# Patient Record
Sex: MALE | Race: WHITE | NOT HISPANIC OR LATINO | Employment: UNEMPLOYED | URBAN - METROPOLITAN AREA
[De-identification: names, ages, dates, MRNs, and addresses within clinical notes are randomized per-mention and may not be internally consistent; named-entity substitution may affect disease eponyms.]

---

## 2018-08-28 ENCOUNTER — HOSPITAL ENCOUNTER (EMERGENCY)
Facility: HOSPITAL | Age: 48
Discharge: HOME | End: 2018-08-29
Attending: EMERGENCY MEDICINE | Admitting: EMERGENCY MEDICINE

## 2018-08-28 ENCOUNTER — APPOINTMENT (OUTPATIENT)
Dept: ULTRASOUND IMAGING | Facility: HOSPITAL | Age: 48
End: 2018-08-28

## 2018-08-28 DIAGNOSIS — N45.1 EPIDIDYMITIS: Primary | ICD-10-CM

## 2018-08-28 LAB
BACTERIA UR QL AUTO: ABNORMAL /HPF
BILIRUB UR QL STRIP: NEGATIVE
CLARITY UR: CLEAR
COLOR UR: ABNORMAL
GLUCOSE UR STRIP-MCNC: NEGATIVE MG/DL
HGB UR QL STRIP.AUTO: NEGATIVE
HYALINE CASTS UR QL AUTO: ABNORMAL /LPF
KETONES UR QL STRIP: NEGATIVE
LEUKOCYTE ESTERASE UR QL STRIP.AUTO: ABNORMAL
NITRITE UR QL STRIP: NEGATIVE
PH UR STRIP.AUTO: <=5 [PH] (ref 5–8)
PROT UR QL STRIP: ABNORMAL
RBC # UR: ABNORMAL /HPF
REF LAB TEST METHOD: ABNORMAL
SP GR UR STRIP: >=1.03 (ref 1–1.03)
SQUAMOUS #/AREA URNS HPF: ABNORMAL /HPF
UROBILINOGEN UR QL STRIP: ABNORMAL
WBC UR QL AUTO: ABNORMAL /HPF

## 2018-08-28 PROCEDURE — 81001 URINALYSIS AUTO W/SCOPE: CPT | Performed by: EMERGENCY MEDICINE

## 2018-08-28 PROCEDURE — 93976 VASCULAR STUDY: CPT

## 2018-08-28 PROCEDURE — 99284 EMERGENCY DEPT VISIT MOD MDM: CPT

## 2018-08-28 PROCEDURE — 76870 US EXAM SCROTUM: CPT

## 2018-08-29 VITALS
HEIGHT: 68 IN | WEIGHT: 190 LBS | TEMPERATURE: 99.1 F | BODY MASS INDEX: 28.79 KG/M2 | DIASTOLIC BLOOD PRESSURE: 103 MMHG | RESPIRATION RATE: 16 BRPM | HEART RATE: 87 BPM | SYSTOLIC BLOOD PRESSURE: 150 MMHG | OXYGEN SATURATION: 97 %

## 2018-08-29 RX ORDER — CIPROFLOXACIN 500 MG/1
500 TABLET, FILM COATED ORAL 2 TIMES DAILY
Qty: 14 TABLET | Refills: 0 | OUTPATIENT
Start: 2018-08-29 | End: 2020-05-18

## 2018-08-29 RX ORDER — HYDROCODONE BITARTRATE AND ACETAMINOPHEN 7.5; 325 MG/1; MG/1
1 TABLET ORAL EVERY 6 HOURS PRN
Qty: 16 TABLET | Refills: 0 | OUTPATIENT
Start: 2018-08-29 | End: 2020-05-18

## 2018-08-29 RX ORDER — HYDROCODONE BITARTRATE AND ACETAMINOPHEN 7.5; 325 MG/1; MG/1
1 TABLET ORAL ONCE
Status: COMPLETED | OUTPATIENT
Start: 2018-08-29 | End: 2018-08-29

## 2018-08-29 RX ADMIN — HYDROCODONE BITARTRATE AND ACETAMINOPHEN 1 TABLET: 7.5; 325 TABLET ORAL at 01:47

## 2019-03-23 ENCOUNTER — HOSPITAL ENCOUNTER (EMERGENCY)
Facility: HOSPITAL | Age: 49
Discharge: HOME | End: 2019-03-23
Attending: EMERGENCY MEDICINE | Admitting: EMERGENCY MEDICINE

## 2019-03-23 VITALS
HEART RATE: 86 BPM | OXYGEN SATURATION: 94 % | HEIGHT: 68 IN | WEIGHT: 200 LBS | RESPIRATION RATE: 16 BRPM | SYSTOLIC BLOOD PRESSURE: 130 MMHG | BODY MASS INDEX: 30.31 KG/M2 | TEMPERATURE: 98.3 F | DIASTOLIC BLOOD PRESSURE: 70 MMHG

## 2019-03-23 DIAGNOSIS — L03.211 FACIAL CELLULITIS: Primary | ICD-10-CM

## 2019-03-23 PROCEDURE — 99283 EMERGENCY DEPT VISIT LOW MDM: CPT

## 2019-03-23 RX ORDER — DOXYCYCLINE 100 MG/1
100 CAPSULE ORAL ONCE
Status: COMPLETED | OUTPATIENT
Start: 2019-03-23 | End: 2019-03-23

## 2019-03-23 RX ORDER — MUPIROCIN CALCIUM 20 MG/G
CREAM TOPICAL 3 TIMES DAILY
Qty: 15 G | Refills: 0 | OUTPATIENT
Start: 2019-03-23 | End: 2020-05-18

## 2019-03-23 RX ORDER — DOXYCYCLINE 100 MG/1
100 CAPSULE ORAL 2 TIMES DAILY
Qty: 20 CAPSULE | Refills: 0 | OUTPATIENT
Start: 2019-03-23 | End: 2020-05-18

## 2019-03-23 RX ORDER — HYDROCODONE BITARTRATE AND ACETAMINOPHEN 5; 325 MG/1; MG/1
1 TABLET ORAL EVERY 6 HOURS PRN
Qty: 4 TABLET | Refills: 0 | OUTPATIENT
Start: 2019-03-23 | End: 2020-05-18

## 2019-03-23 RX ORDER — HYDROCODONE BITARTRATE AND ACETAMINOPHEN 5; 325 MG/1; MG/1
1 TABLET ORAL EVERY 6 HOURS PRN
Status: DISCONTINUED | OUTPATIENT
Start: 2019-03-23 | End: 2019-03-23 | Stop reason: HOSPADM

## 2019-03-23 RX ADMIN — DOXYCYCLINE 100 MG: 100 CAPSULE ORAL at 19:56

## 2019-03-23 RX ADMIN — HYDROCODONE BITARTRATE AND ACETAMINOPHEN 1 TABLET: 5; 325 TABLET ORAL at 19:57

## 2020-05-17 ENCOUNTER — HOSPITAL ENCOUNTER (EMERGENCY)
Facility: HOSPITAL | Age: 50
Discharge: HOME | End: 2020-05-18
Attending: EMERGENCY MEDICINE | Admitting: EMERGENCY MEDICINE

## 2020-05-17 ENCOUNTER — APPOINTMENT (OUTPATIENT)
Dept: CT IMAGING | Facility: HOSPITAL | Age: 50
End: 2020-05-17

## 2020-05-17 DIAGNOSIS — K59.00 CONSTIPATION, UNSPECIFIED CONSTIPATION TYPE: Primary | ICD-10-CM

## 2020-05-17 LAB
ALBUMIN SERPL-MCNC: 4.3 G/DL (ref 3.5–5.2)
ALBUMIN/GLOB SERPL: 1.4 G/DL
ALP SERPL-CCNC: 72 U/L (ref 39–117)
ALT SERPL W P-5'-P-CCNC: 11 U/L (ref 1–41)
ANION GAP SERPL CALCULATED.3IONS-SCNC: 11.1 MMOL/L (ref 5–15)
AST SERPL-CCNC: 16 U/L (ref 1–40)
BASOPHILS # BLD AUTO: 0.05 10*3/MM3 (ref 0–0.2)
BASOPHILS NFR BLD AUTO: 0.5 % (ref 0–1.5)
BILIRUB SERPL-MCNC: 0.4 MG/DL (ref 0.2–1.2)
BILIRUB UR QL STRIP: NEGATIVE
BUN BLD-MCNC: 12 MG/DL (ref 6–20)
BUN/CREAT SERPL: 10.3 (ref 7–25)
CALCIUM SPEC-SCNC: 8.9 MG/DL (ref 8.6–10.5)
CHLORIDE SERPL-SCNC: 98 MMOL/L (ref 98–107)
CLARITY UR: ABNORMAL
CO2 SERPL-SCNC: 28.9 MMOL/L (ref 22–29)
COLOR UR: YELLOW
CREAT BLD-MCNC: 1.16 MG/DL (ref 0.76–1.27)
D-LACTATE SERPL-SCNC: 1.1 MMOL/L (ref 0.5–2)
DEPRECATED RDW RBC AUTO: 41.2 FL (ref 37–54)
EOSINOPHIL # BLD AUTO: 0.12 10*3/MM3 (ref 0–0.4)
EOSINOPHIL NFR BLD AUTO: 1.1 % (ref 0.3–6.2)
ERYTHROCYTE [DISTWIDTH] IN BLOOD BY AUTOMATED COUNT: 12.7 % (ref 12.3–15.4)
GFR SERPL CREATININE-BSD FRML MDRD: 67 ML/MIN/1.73
GLOBULIN UR ELPH-MCNC: 3.1 GM/DL
GLUCOSE BLD-MCNC: 93 MG/DL (ref 65–99)
GLUCOSE UR STRIP-MCNC: NEGATIVE MG/DL
HCT VFR BLD AUTO: 41.3 % (ref 37.5–51)
HGB BLD-MCNC: 14.4 G/DL (ref 13–17.7)
HGB UR QL STRIP.AUTO: NEGATIVE
HOLD SPECIMEN: NORMAL
HOLD SPECIMEN: NORMAL
IMM GRANULOCYTES # BLD AUTO: 0.02 10*3/MM3 (ref 0–0.05)
IMM GRANULOCYTES NFR BLD AUTO: 0.2 % (ref 0–0.5)
KETONES UR QL STRIP: NEGATIVE
LEUKOCYTE ESTERASE UR QL STRIP.AUTO: NEGATIVE
LIPASE SERPL-CCNC: 15 U/L (ref 13–60)
LYMPHOCYTES # BLD AUTO: 2.36 10*3/MM3 (ref 0.7–3.1)
LYMPHOCYTES NFR BLD AUTO: 22.5 % (ref 19.6–45.3)
MCH RBC QN AUTO: 31.1 PG (ref 26.6–33)
MCHC RBC AUTO-ENTMCNC: 34.9 G/DL (ref 31.5–35.7)
MCV RBC AUTO: 89.2 FL (ref 79–97)
MONOCYTES # BLD AUTO: 0.9 10*3/MM3 (ref 0.1–0.9)
MONOCYTES NFR BLD AUTO: 8.6 % (ref 5–12)
NEUTROPHILS # BLD AUTO: 7.06 10*3/MM3 (ref 1.7–7)
NEUTROPHILS NFR BLD AUTO: 67.1 % (ref 42.7–76)
NITRITE UR QL STRIP: NEGATIVE
NRBC BLD AUTO-RTO: 0 /100 WBC (ref 0–0.2)
PH UR STRIP.AUTO: 7 [PH] (ref 5–8)
PLATELET # BLD AUTO: 182 10*3/MM3 (ref 140–450)
PMV BLD AUTO: 9.8 FL (ref 6–12)
POTASSIUM BLD-SCNC: 3.8 MMOL/L (ref 3.5–5.2)
PROT SERPL-MCNC: 7.4 G/DL (ref 6–8.5)
PROT UR QL STRIP: NEGATIVE
RBC # BLD AUTO: 4.63 10*6/MM3 (ref 4.14–5.8)
SODIUM BLD-SCNC: 138 MMOL/L (ref 136–145)
SP GR UR STRIP: 1.01 (ref 1–1.03)
UROBILINOGEN UR QL STRIP: ABNORMAL
WBC NRBC COR # BLD: 10.51 10*3/MM3 (ref 3.4–10.8)
WHOLE BLOOD HOLD SPECIMEN: NORMAL
WHOLE BLOOD HOLD SPECIMEN: NORMAL

## 2020-05-17 PROCEDURE — 99284 EMERGENCY DEPT VISIT MOD MDM: CPT

## 2020-05-17 PROCEDURE — 81003 URINALYSIS AUTO W/O SCOPE: CPT | Performed by: EMERGENCY MEDICINE

## 2020-05-17 PROCEDURE — 25010000002 IOPAMIDOL 61 % SOLUTION: Performed by: EMERGENCY MEDICINE

## 2020-05-17 PROCEDURE — 80053 COMPREHEN METABOLIC PANEL: CPT

## 2020-05-17 PROCEDURE — 85025 COMPLETE CBC W/AUTO DIFF WBC: CPT

## 2020-05-17 PROCEDURE — 83690 ASSAY OF LIPASE: CPT

## 2020-05-17 PROCEDURE — 74177 CT ABD & PELVIS W/CONTRAST: CPT

## 2020-05-17 PROCEDURE — 83605 ASSAY OF LACTIC ACID: CPT | Performed by: NURSE PRACTITIONER

## 2020-05-17 RX ORDER — SODIUM CHLORIDE 0.9 % (FLUSH) 0.9 %
10 SYRINGE (ML) INJECTION AS NEEDED
Status: DISCONTINUED | OUTPATIENT
Start: 2020-05-17 | End: 2020-05-18 | Stop reason: HOSPADM

## 2020-05-17 RX ADMIN — SODIUM CHLORIDE 1000 ML: 9 INJECTION, SOLUTION INTRAVENOUS at 22:59

## 2020-05-17 RX ADMIN — IOPAMIDOL 85 ML: 612 INJECTION, SOLUTION INTRAVENOUS at 23:59

## 2020-05-17 RX ADMIN — SODIUM CHLORIDE, PRESERVATIVE FREE 10 ML: 5 INJECTION INTRAVENOUS at 22:37

## 2020-05-18 VITALS
DIASTOLIC BLOOD PRESSURE: 97 MMHG | SYSTOLIC BLOOD PRESSURE: 148 MMHG | HEIGHT: 69 IN | RESPIRATION RATE: 18 BRPM | TEMPERATURE: 97.5 F | WEIGHT: 197.5 LBS | HEART RATE: 85 BPM | BODY MASS INDEX: 29.25 KG/M2 | OXYGEN SATURATION: 96 %

## 2020-05-18 RX ORDER — POLYETHYLENE GLYCOL 3350 17 G/17G
17 POWDER, FOR SOLUTION ORAL DAILY
Qty: 500 G | Refills: 0 | Status: SHIPPED | OUTPATIENT
Start: 2020-05-18

## 2020-05-18 NOTE — ED PROVIDER NOTES
MD ATTESTATION NOTE    The VALENTINO and I have discussed this patient's history, physical exam, and treatment plan.  I have reviewed the documentation and personally had a face to face interaction with the patient. I affirm the documentation and agree with the treatment and plan.  The attached note describes my personal findings.    Patient was placed in face mask in first look. Patient was wearing facemask when I entered the room and throughout our encounter. I wore full protective equipment throughout this patient encounter including a face mask, and gloves. Hand hygiene was performed before donning protective equipment and after removal when leaving the room.    HPI:    Patient reports increasing mid abdominal pain for past 2 days with constipation, last BM 3 days ago. He reports no relief with exlax or OTC enemas.  No fever, chronic dysuria and frequency. No hematachezia or melena.    PE:    Abdomen - not distended moderate mid abdominal tenderness without rebound, no tenderness to LLQ. Bowel sounds normal.    Patient reports moderate results with enema and abdominal cramps have diminished.    DIAGNOSIS:    Final diagnoses:   Constipation, unspecified constipation type       DISPOSITION:    DISCHARGED     Reginaldo Krishnan MD  05/18/20 0154

## 2020-05-18 NOTE — ED NOTES
Pt placed in mask upon arrival, staff wearing masks. Pt reports severe constipation and low abd pain. Pt has tried enemas and exlax without results. Last bm was 3 days ago.     Moon Johnson RN  05/17/20 4583

## 2020-05-18 NOTE — ED PROVIDER NOTES
EMERGENCY DEPARTMENT ENCOUNTER    Room Number:  11/11  Date of encounter:  5/18/2020  PCP: Provider, No Known  Historian: patient   Full history not obtainable due to: none     HPI:  Chief Complaint: abdominal pain     Context: Colten Hager is a 49 y.o. male who presents to the ED c/o abdominal pain onset 2 days ago. Reports intermittent severe pain diffusely across his abdomen associated with constipation. Last normal bm for 4 days prior. Did take exlax yesterday and had very small amount of stool out and did not feel any better.   No vomiting but reports nausea. No fever. Does reports dysuria but it has been ongoing for 1 year.     No hx of diverticulitis. No hx of pancreatitis. No prior colonoscopy. No prior abdominal surgeries. Not a daily ETOH user. Smoker, 1PPD.  Not anticoagulated.         PAST MEDICAL HISTORY    Active Ambulatory Problems     Diagnosis Date Noted   • No Active Ambulatory Problems     Resolved Ambulatory Problems     Diagnosis Date Noted   • No Resolved Ambulatory Problems     Past Medical History:   Diagnosis Date   • Cellulitis    • GERD (gastroesophageal reflux disease)    • Hyperlipidemia    • Hypertension          PAST SURGICAL HISTORY  History reviewed. No pertinent surgical history.      FAMILY HISTORY  History reviewed. No pertinent family history.      SOCIAL HISTORY  Social History     Socioeconomic History   • Marital status:      Spouse name: Not on file   • Number of children: Not on file   • Years of education: Not on file   • Highest education level: Not on file   Tobacco Use   • Smoking status: Current Every Day Smoker     Packs/day: 1.00   • Smokeless tobacco: Never Used   Substance and Sexual Activity   • Alcohol use: No   • Drug use: Yes     Types: Methamphetamines     Comment: snorts meth every other day         ALLERGIES  Patient has no known allergies.        REVIEW OF SYSTEMS  Review of Systems   All systems reviewed and marked as negative except as listed in  HPI       PHYSICAL EXAM    I have reviewed the triage vital signs and nursing notes.    ED Triage Vitals [05/17/20 2224]   Temp Heart Rate Resp BP SpO2   97.7 °F (36.5 °C) 95 20 -- 98 %      Temp src Heart Rate Source Patient Position BP Location FiO2 (%)   Tympanic Monitor -- -- --       GENERAL: alert well developed, well nourished in mild distress secondary to pain  HENT: NCAT, neck supple, trachea midline  EYES: no scleral icterus, PERRL, normal conjunctiva  CV: regular rhythm, regular rate, no murmur  RESPIRATORY: unlabored effort, CTAB  ABDOMEN: soft, diffusely tender, non-distended, bowel sounds present  MUSCULOSKELETAL: no gross deformity  NEURO: alert,  sensory and motor function of extremities grossly intact, speech clear, mental status normal/baseline  SKIN: warm, dry, no rash  PSYCH:  Appropriate mood and affect    Vital signs and nursing notes reviewed.          LAB RESULTS  Recent Results (from the past 24 hour(s))   Comprehensive Metabolic Panel    Collection Time: 05/17/20 10:37 PM   Result Value Ref Range    Glucose 93 65 - 99 mg/dL    BUN 12 6 - 20 mg/dL    Creatinine 1.16 0.76 - 1.27 mg/dL    Sodium 138 136 - 145 mmol/L    Potassium 3.8 3.5 - 5.2 mmol/L    Chloride 98 98 - 107 mmol/L    CO2 28.9 22.0 - 29.0 mmol/L    Calcium 8.9 8.6 - 10.5 mg/dL    Total Protein 7.4 6.0 - 8.5 g/dL    Albumin 4.30 3.50 - 5.20 g/dL    ALT (SGPT) 11 1 - 41 U/L    AST (SGOT) 16 1 - 40 U/L    Alkaline Phosphatase 72 39 - 117 U/L    Total Bilirubin 0.4 0.2 - 1.2 mg/dL    eGFR Non African Amer 67 >60 mL/min/1.73    Globulin 3.1 gm/dL    A/G Ratio 1.4 g/dL    BUN/Creatinine Ratio 10.3 7.0 - 25.0    Anion Gap 11.1 5.0 - 15.0 mmol/L   Lipase    Collection Time: 05/17/20 10:37 PM   Result Value Ref Range    Lipase 15 13 - 60 U/L   Light Blue Top    Collection Time: 05/17/20 10:37 PM   Result Value Ref Range    Extra Tube hold for add-on    Green Top (Gel)    Collection Time: 05/17/20 10:37 PM   Result Value Ref Range     Extra Tube Hold for add-ons.    Lavender Top    Collection Time: 05/17/20 10:37 PM   Result Value Ref Range    Extra Tube hold for add-on    Gold Top - SST    Collection Time: 05/17/20 10:37 PM   Result Value Ref Range    Extra Tube Hold for add-ons.    CBC Auto Differential    Collection Time: 05/17/20 10:37 PM   Result Value Ref Range    WBC 10.51 3.40 - 10.80 10*3/mm3    RBC 4.63 4.14 - 5.80 10*6/mm3    Hemoglobin 14.4 13.0 - 17.7 g/dL    Hematocrit 41.3 37.5 - 51.0 %    MCV 89.2 79.0 - 97.0 fL    MCH 31.1 26.6 - 33.0 pg    MCHC 34.9 31.5 - 35.7 g/dL    RDW 12.7 12.3 - 15.4 %    RDW-SD 41.2 37.0 - 54.0 fl    MPV 9.8 6.0 - 12.0 fL    Platelets 182 140 - 450 10*3/mm3    Neutrophil % 67.1 42.7 - 76.0 %    Lymphocyte % 22.5 19.6 - 45.3 %    Monocyte % 8.6 5.0 - 12.0 %    Eosinophil % 1.1 0.3 - 6.2 %    Basophil % 0.5 0.0 - 1.5 %    Immature Grans % 0.2 0.0 - 0.5 %    Neutrophils, Absolute 7.06 (H) 1.70 - 7.00 10*3/mm3    Lymphocytes, Absolute 2.36 0.70 - 3.10 10*3/mm3    Monocytes, Absolute 0.90 0.10 - 0.90 10*3/mm3    Eosinophils, Absolute 0.12 0.00 - 0.40 10*3/mm3    Basophils, Absolute 0.05 0.00 - 0.20 10*3/mm3    Immature Grans, Absolute 0.02 0.00 - 0.05 10*3/mm3    nRBC 0.0 0.0 - 0.2 /100 WBC   Lactic Acid, Plasma    Collection Time: 05/17/20 10:57 PM   Result Value Ref Range    Lactate 1.1 0.5 - 2.0 mmol/L   Urinalysis With Microscopic If Indicated (No Culture) - Urine, Clean Catch    Collection Time: 05/17/20 11:42 PM   Result Value Ref Range    Color, UA Yellow Yellow, Straw    Appearance, UA Cloudy (A) Clear    pH, UA 7.0 5.0 - 8.0    Specific Gravity, UA 1.011 1.005 - 1.030    Glucose, UA Negative Negative    Ketones, UA Negative Negative    Bilirubin, UA Negative Negative    Blood, UA Negative Negative    Protein, UA Negative Negative    Leuk Esterase, UA Negative Negative    Nitrite, UA Negative Negative    Urobilinogen, UA 0.2 E.U./dL 0.2 - 1.0 E.U./dL       Ordered the above labs and independently  reviewed the results.        RADIOLOGY  Ct Abdomen Pelvis With Contrast    Result Date: 5/18/2020  CT OF THE ABDOMEN AND PELVIS WITH CONTRAST  HISTORY: Diffuse abdominal pain and constipation  COMPARISON: None available.  TECHNIQUE: Axial CT imaging was obtained from the dome the diaphragm to the symphysis pubis. IV contrast was administered.  FINDINGS: Cardiomegaly is present. There is vascular congestion. There is a small hiatal hernia. Duodenum is unremarkable, as are the adrenal glands, spleen, pancreas, and gallbladder. No focal hepatic lesions are seen, and there is no biliary dilatation. The kidneys enhance symmetrically. There is some mild prominence of the renal collecting systems bilaterally. This may be related to some urinary retention, as the urinary bladder does appear distended. Prostate gland contains dystrophic calcifications. The appendix is normal. No free fluid or adenopathy is seen within the pelvis. Stool burden is mildly increased within the ascending and transverse colon, which would be in keeping with history of constipation. The distal colon appears relatively decompressed. No acute osseous abnormalities are seen.       1. Increased stool burden identified within the ascending and transverse colon, in keeping with history of constipation. No evidence chemical bowel obstruction. 2. Cardiomegaly with evidence of vascular congestion. 3. Mild prominence of the renal collecting systems, favored to be related to some urinary retention.  Radiation dose reduction techniques were utilized, including automated exposure control and exposure modulation based on body size.  This report was finalized on 5/18/2020 12:41 AM by Dr. Candie Chang M.D.        I ordered the above noted radiological studies. Independently reviewed by me and discussed with radiologist.  See dictation above for official radiology interpretation.      PROCEDURES    Procedures        MEDICATIONS GIVEN IN ER    Medications    sodium chloride 0.9 % flush 10 mL (10 mL Intravenous Given 5/17/20 2237)   sodium chloride 0.9 % bolus 1,000 mL (0 mL Intravenous Stopped 5/18/20 0006)   iopamidol (ISOVUE-300) 61 % injection 100 mL (85 mL Intravenous Given by Other 5/17/20 8969)         PROGRESS, DATA ANALYSIS, CONSULTS, AND MEDICAL DECISION MAKING    All labs have been independently reviewed by me.  All radiology studies have been reviewed by me.   EKG's independently reviewed by me.  Discussion below represents my analysis of pertinent findings related to patient's condition, differential diagnosis, treatment plan and final disposition.    DIFFERENTIAL DIAGNOSIS INCLUDE BUT NOT LIMITED TO: Diverticulitis, colitis, small bowel obstruction, proctitis, irritable bowel syndrome, fecal impaction, ileus, AAA, appendicitis, abdominal spasm, constipation    ED Course as of May 18 0156   Mon May 18, 2020   0030 I viewed the CT scan on PACS system.  My findings are moderate stool burden    [JS]   0154 Reviewed CT results and findings of ascending and transverse colon constipation.  Laboratory work-up is unremarkable.  My suspicion for ischemic bowel is low given findings of constipation and history that confirms, in addition to normal white blood cell count and normal lactic acid.  The patient was given an enema in the emergency department and had good results in terms of stool output and decrease in his cramping.  He will be discharged home with return precautions.    [JS]      ED Course User Index  [JS] Lizeth Clark APRN       AS OF 01:56 VITALS:    BP - 145/97  HR - 84  TEMP - 97.7 °F (36.5 °C) (Tympanic)  02 SATS - 93%        DIAGNOSIS  Final diagnoses:   Constipation, unspecified constipation type         DISPOSITION  Discharge        Lizeth Clark, DIANA  05/18/20 0156

## 2020-05-19 ENCOUNTER — HOSPITAL ENCOUNTER (EMERGENCY)
Facility: HOSPITAL | Age: 50
Discharge: HOME | End: 2020-05-19
Attending: EMERGENCY MEDICINE | Admitting: EMERGENCY MEDICINE

## 2020-05-19 VITALS
RESPIRATION RATE: 18 BRPM | HEART RATE: 92 BPM | HEIGHT: 71 IN | SYSTOLIC BLOOD PRESSURE: 144 MMHG | WEIGHT: 195 LBS | TEMPERATURE: 96.7 F | DIASTOLIC BLOOD PRESSURE: 105 MMHG | BODY MASS INDEX: 27.3 KG/M2 | OXYGEN SATURATION: 97 %

## 2020-05-19 DIAGNOSIS — I10 ESSENTIAL HYPERTENSION: ICD-10-CM

## 2020-05-19 DIAGNOSIS — K59.00 CONSTIPATION, UNSPECIFIED CONSTIPATION TYPE: ICD-10-CM

## 2020-05-19 DIAGNOSIS — K29.00 ACUTE GASTRITIS, PRESENCE OF BLEEDING UNSPECIFIED, UNSPECIFIED GASTRITIS TYPE: Primary | ICD-10-CM

## 2020-05-19 DIAGNOSIS — R10.13 EPIGASTRIC PAIN: ICD-10-CM

## 2020-05-19 LAB
ALBUMIN SERPL-MCNC: 4.2 G/DL (ref 3.5–5.2)
ALBUMIN/GLOB SERPL: 1.4 G/DL
ALP SERPL-CCNC: 74 U/L (ref 39–117)
ALT SERPL W P-5'-P-CCNC: 12 U/L (ref 1–41)
ANION GAP SERPL CALCULATED.3IONS-SCNC: 10.6 MMOL/L (ref 5–15)
AST SERPL-CCNC: 13 U/L (ref 1–40)
BACTERIA UR QL AUTO: NORMAL /HPF
BASOPHILS # BLD AUTO: 0.07 10*3/MM3 (ref 0–0.2)
BASOPHILS NFR BLD AUTO: 0.8 % (ref 0–1.5)
BILIRUB SERPL-MCNC: 0.3 MG/DL (ref 0.2–1.2)
BILIRUB UR QL STRIP: NEGATIVE
BUN BLD-MCNC: 12 MG/DL (ref 6–20)
BUN/CREAT SERPL: 12.1 (ref 7–25)
CALCIUM SPEC-SCNC: 8.7 MG/DL (ref 8.6–10.5)
CHLORIDE SERPL-SCNC: 102 MMOL/L (ref 98–107)
CLARITY UR: CLEAR
CO2 SERPL-SCNC: 24.4 MMOL/L (ref 22–29)
COLOR UR: YELLOW
CREAT BLD-MCNC: 0.99 MG/DL (ref 0.76–1.27)
DEPRECATED RDW RBC AUTO: 42.4 FL (ref 37–54)
EOSINOPHIL # BLD AUTO: 0.1 10*3/MM3 (ref 0–0.4)
EOSINOPHIL NFR BLD AUTO: 1.1 % (ref 0.3–6.2)
ERYTHROCYTE [DISTWIDTH] IN BLOOD BY AUTOMATED COUNT: 12.9 % (ref 12.3–15.4)
GFR SERPL CREATININE-BSD FRML MDRD: 80 ML/MIN/1.73
GLOBULIN UR ELPH-MCNC: 2.9 GM/DL
GLUCOSE BLD-MCNC: 108 MG/DL (ref 65–99)
GLUCOSE UR STRIP-MCNC: NEGATIVE MG/DL
HCT VFR BLD AUTO: 43.8 % (ref 37.5–51)
HGB BLD-MCNC: 14.9 G/DL (ref 13–17.7)
HGB UR QL STRIP.AUTO: NEGATIVE
HOLD SPECIMEN: NORMAL
HOLD SPECIMEN: NORMAL
HYALINE CASTS UR QL AUTO: NORMAL /LPF
IMM GRANULOCYTES # BLD AUTO: 0.02 10*3/MM3 (ref 0–0.05)
IMM GRANULOCYTES NFR BLD AUTO: 0.2 % (ref 0–0.5)
KETONES UR QL STRIP: NEGATIVE
LEUKOCYTE ESTERASE UR QL STRIP.AUTO: NEGATIVE
LIPASE SERPL-CCNC: 15 U/L (ref 13–60)
LYMPHOCYTES # BLD AUTO: 1.93 10*3/MM3 (ref 0.7–3.1)
LYMPHOCYTES NFR BLD AUTO: 21.5 % (ref 19.6–45.3)
MCH RBC QN AUTO: 30.5 PG (ref 26.6–33)
MCHC RBC AUTO-ENTMCNC: 34 G/DL (ref 31.5–35.7)
MCV RBC AUTO: 89.8 FL (ref 79–97)
MONOCYTES # BLD AUTO: 1.06 10*3/MM3 (ref 0.1–0.9)
MONOCYTES NFR BLD AUTO: 11.8 % (ref 5–12)
NEUTROPHILS # BLD AUTO: 5.79 10*3/MM3 (ref 1.7–7)
NEUTROPHILS NFR BLD AUTO: 64.6 % (ref 42.7–76)
NITRITE UR QL STRIP: NEGATIVE
NRBC BLD AUTO-RTO: 0 /100 WBC (ref 0–0.2)
PH UR STRIP.AUTO: 7 [PH] (ref 5–8)
PLATELET # BLD AUTO: 193 10*3/MM3 (ref 140–450)
PMV BLD AUTO: 10.3 FL (ref 6–12)
POTASSIUM BLD-SCNC: 4 MMOL/L (ref 3.5–5.2)
PROT SERPL-MCNC: 7.1 G/DL (ref 6–8.5)
PROT UR QL STRIP: ABNORMAL
RBC # BLD AUTO: 4.88 10*6/MM3 (ref 4.14–5.8)
RBC # UR: NORMAL /HPF
REF LAB TEST METHOD: NORMAL
SODIUM BLD-SCNC: 137 MMOL/L (ref 136–145)
SP GR UR STRIP: 1.02 (ref 1–1.03)
SQUAMOUS #/AREA URNS HPF: NORMAL /HPF
UROBILINOGEN UR QL STRIP: ABNORMAL
WBC NRBC COR # BLD: 8.97 10*3/MM3 (ref 3.4–10.8)
WBC UR QL AUTO: NORMAL /HPF
WHOLE BLOOD HOLD SPECIMEN: NORMAL

## 2020-05-19 PROCEDURE — 99283 EMERGENCY DEPT VISIT LOW MDM: CPT

## 2020-05-19 PROCEDURE — 83690 ASSAY OF LIPASE: CPT | Performed by: EMERGENCY MEDICINE

## 2020-05-19 PROCEDURE — 80053 COMPREHEN METABOLIC PANEL: CPT | Performed by: EMERGENCY MEDICINE

## 2020-05-19 PROCEDURE — 51798 US URINE CAPACITY MEASURE: CPT

## 2020-05-19 PROCEDURE — 81001 URINALYSIS AUTO W/SCOPE: CPT | Performed by: EMERGENCY MEDICINE

## 2020-05-19 PROCEDURE — 85025 COMPLETE CBC W/AUTO DIFF WBC: CPT | Performed by: EMERGENCY MEDICINE

## 2020-05-19 RX ORDER — ALUMINA, MAGNESIA, AND SIMETHICONE 2400; 2400; 240 MG/30ML; MG/30ML; MG/30ML
15 SUSPENSION ORAL ONCE
Status: COMPLETED | OUTPATIENT
Start: 2020-05-19 | End: 2020-05-19

## 2020-05-19 RX ORDER — SUCRALFATE 1 G/1
1 TABLET ORAL 4 TIMES DAILY
Qty: 16 TABLET | Refills: 0 | Status: SHIPPED | OUTPATIENT
Start: 2020-05-19

## 2020-05-19 RX ORDER — SODIUM CHLORIDE 0.9 % (FLUSH) 0.9 %
10 SYRINGE (ML) INJECTION AS NEEDED
Status: DISCONTINUED | OUTPATIENT
Start: 2020-05-19 | End: 2020-05-19 | Stop reason: HOSPADM

## 2020-05-19 RX ORDER — PANTOPRAZOLE SODIUM 40 MG/1
40 TABLET, DELAYED RELEASE ORAL DAILY
Qty: 14 TABLET | Refills: 0 | Status: SHIPPED | OUTPATIENT
Start: 2020-05-19 | End: 2020-06-02

## 2020-05-19 RX ORDER — LIDOCAINE HYDROCHLORIDE 20 MG/ML
15 SOLUTION OROPHARYNGEAL ONCE
Status: COMPLETED | OUTPATIENT
Start: 2020-05-19 | End: 2020-05-19

## 2020-05-19 RX ADMIN — ALUMINUM HYDROXIDE, MAGNESIUM HYDROXIDE, AND DIMETHICONE 15 ML: 400; 400; 40 SUSPENSION ORAL at 13:02

## 2020-05-19 RX ADMIN — LIDOCAINE HYDROCHLORIDE 15 ML: 20 SOLUTION ORAL; TOPICAL at 13:02

## 2020-05-19 NOTE — DISCHARGE INSTRUCTIONS
You are advised to follow closely with primary physician of your choice in 2-3 days Dr. Moran or gastroenterology specialist of your choice in 7 to 10 days as needed for recheck, blood pressure recheck, final results of lab work and imaging testing, and further testing/treatment as needed.    Drink at least 8 glasses of water daily.  Eat a diet full of fruits and vegetables.  Avoid processed foods.  Continue MiraLAX daily while suffering from constipation.  Take probiotic supplement daily.      Please return to the emergency department immediately with chest pain different than usual for you, shortness of air, persistent or worsening abdominal pain, persistent vomiting/fever, blood in emesis or stool, lightheadedness/fainting, problems with speech, one sided weakness/numbness, new incontinence, problems with vision,  or for worsening of symptoms or other concerns.

## 2020-05-19 NOTE — ED PROVIDER NOTES
EMERGENCY DEPARTMENT ENCOUNTER    CHIEF COMPLAINT  Chief Complaint: Abdominal pain  History given by: Patient  History limited by: Nothing  Room Number: 35/35  PMD: Provider, No Known    On review of patient's medical record it is noted that he had a CT abdomen pelvis done 2 days prior to his arrival today that is significant for mildly increased stool burden within the ascending and transverse colon without signs of obstruction and some mild prominence of the renal collecting systems bilaterally with urinary bladder distention.    HPI:  Pt is a 49 y.o. male who presents complaining of abdominal pain for 4 days.  Patient reports his last bowel movement was approximately a week ago except for small amount of stool with enema and episodically at home.  Patient reports his pain is characterizes bloated or cramping in nature with occasional worsening of sharp pains after eating.  Patient denies vomiting but reports he has had nausea, denies fever states he has had a chronic cough that is unchanged, denies chest pain or shortness of air.  Patient denies testicular or scrotal swelling or pain.  Patient denies urinary complaints, swelling of extremities, history of abdominal surgeries.  Patient reports he does use tobacco and uses meth regularly with last use yesterday.  Patient does report he is taking MiraLAX yesterday and today and suffers from reflux currently using Tums at home.    Duration: 1 week  Intensity/Severity: Moderate  Associated Symptoms: Nausea  Aggravating Factors: Food  Alleviating Factors: Nothing  Previous Episodes: History of reflux  Treatment before arrival: Relaxed today    PAST MEDICAL HISTORY  Active Ambulatory Problems     Diagnosis Date Noted   • No Active Ambulatory Problems     Resolved Ambulatory Problems     Diagnosis Date Noted   • No Resolved Ambulatory Problems     Past Medical History:   Diagnosis Date   • Cellulitis    • GERD (gastroesophageal reflux disease)    • Hyperlipidemia    •  Hypertension        PAST SURGICAL HISTORY  No past surgical history on file.    FAMILY HISTORY  No family history on file.    SOCIAL HISTORY  Social History     Socioeconomic History   • Marital status:      Spouse name: Not on file   • Number of children: Not on file   • Years of education: Not on file   • Highest education level: Not on file   Tobacco Use   • Smoking status: Current Every Day Smoker     Packs/day: 1.00   • Smokeless tobacco: Never Used   Substance and Sexual Activity   • Alcohol use: No   • Drug use: Yes     Types: Methamphetamines     Comment: snorts meth every other day       ALLERGIES  Patient has no known allergies.    REVIEW OF SYSTEMS  Review of Systems   Constitutional: Negative for chills and fever.   HENT: Negative for sore throat and trouble swallowing.    Eyes: Negative for visual disturbance.   Respiratory: Positive for cough ( Chronic unchanged for several months). Negative for shortness of breath.    Cardiovascular: Negative for chest pain and leg swelling.   Gastrointestinal: Positive for abdominal pain, constipation and nausea. Negative for diarrhea and vomiting.   Endocrine: Negative.    Genitourinary: Negative for decreased urine volume, difficulty urinating and frequency.   Musculoskeletal: Negative for neck pain.   Skin: Negative for rash.   Allergic/Immunologic: Negative.    Neurological: Negative for weakness and numbness.   Hematological: Negative.    Psychiatric/Behavioral: Negative.    All other systems reviewed and are negative.      PHYSICAL EXAM  ED Triage Vitals   Temp Heart Rate Resp BP SpO2   05/19/20 1125 05/19/20 1125 05/19/20 1125 05/19/20 1137 05/19/20 1125   96.7 °F (35.9 °C) 92 18 (!) 154/112 99 %      Temp src Heart Rate Source Patient Position BP Location FiO2 (%)   -- -- -- -- --              Physical Exam   Constitutional: He is oriented to person, place, and time. He appears distressed.   HENT:   Head: Normocephalic and atraumatic.   Eyes: EOM are  normal.   Neck: Normal range of motion.   Cardiovascular: Normal rate, regular rhythm and normal heart sounds.   No murmur heard.  Pulses:       Posterior tibial pulses are 2+ on the right side, and 2+ on the left side.   Pulmonary/Chest: Effort normal and breath sounds normal. No respiratory distress. He has no wheezes.   Abdominal: Soft. Bowel sounds are normal. There is tenderness in the epigastric area. There is no rebound and no guarding.   Musculoskeletal: Normal range of motion. He exhibits no edema.   Neurological: He is alert and oriented to person, place, and time.   Skin: Skin is warm and dry.   Psychiatric: Affect normal.   Nursing note and vitals reviewed.      LAB RESULTS  Lab Results (last 24 hours)     Procedure Component Value Units Date/Time    CBC & Differential [[id number]] Collected:  05/19/20 1209    Specimen:  Blood Updated:  05/19/20 1216    Narrative:       The following orders were created for panel order CBC & Differential.  Procedure                               Abnormality         Status                     ---------                               -----------         ------                     CBC Auto Differential[[id number]]        Abnormal            Final result                 Please view results for these tests on the individual orders.    Comprehensive Metabolic Panel [[id number]]  (Abnormal) Collected:  05/19/20 1209    Specimen:  Blood Updated:  05/19/20 1242     Glucose 108 mg/dL      BUN 12 mg/dL      Creatinine 0.99 mg/dL      Sodium 137 mmol/L      Potassium 4.0 mmol/L      Chloride 102 mmol/L      CO2 24.4 mmol/L      Calcium 8.7 mg/dL      Total Protein 7.1 g/dL      Albumin 4.20 g/dL      ALT (SGPT) 12 U/L      AST (SGOT) 13 U/L      Alkaline Phosphatase 74 U/L      Total Bilirubin 0.3 mg/dL      eGFR Non African Amer 80 mL/min/1.73      Globulin 2.9 gm/dL      A/G Ratio 1.4 g/dL      BUN/Creatinine Ratio 12.1     Anion Gap 10.6 mmol/L     Narrative:       GFR Normal  >60  Chronic Kidney Disease <60  Kidney Failure <15      Lipase [[id number]]  (Normal) Collected:  05/19/20 1209    Specimen:  Blood Updated:  05/19/20 1242     Lipase 15 U/L     CBC Auto Differential [[id number]]  (Abnormal) Collected:  05/19/20 1209    Specimen:  Blood Updated:  05/19/20 1216     WBC 8.97 10*3/mm3      RBC 4.88 10*6/mm3      Hemoglobin 14.9 g/dL      Hematocrit 43.8 %      MCV 89.8 fL      MCH 30.5 pg      MCHC 34.0 g/dL      RDW 12.9 %      RDW-SD 42.4 fl      MPV 10.3 fL      Platelets 193 10*3/mm3      Neutrophil % 64.6 %      Lymphocyte % 21.5 %      Monocyte % 11.8 %      Eosinophil % 1.1 %      Basophil % 0.8 %      Immature Grans % 0.2 %      Neutrophils, Absolute 5.79 10*3/mm3      Lymphocytes, Absolute 1.93 10*3/mm3      Monocytes, Absolute 1.06 10*3/mm3      Eosinophils, Absolute 0.10 10*3/mm3      Basophils, Absolute 0.07 10*3/mm3      Immature Grans, Absolute 0.02 10*3/mm3      nRBC 0.0 /100 WBC     Urinalysis With Microscopic If Indicated (No Culture) - Urine, Clean Catch [[id number]]  (Abnormal) Collected:  05/19/20 1501    Specimen:  Urine, Clean Catch Updated:  05/19/20 1512     Color, UA Yellow     Appearance, UA Clear     pH, UA 7.0     Specific Gravity, UA 1.016     Glucose, UA Negative     Ketones, UA Negative     Bilirubin, UA Negative     Blood, UA Negative     Protein,  mg/dL (2+)     Leuk Esterase, UA Negative     Nitrite, UA Negative     Urobilinogen, UA 0.2 E.U./dL    Urinalysis, Microscopic Only - Urine, Clean Catch [[id number]] Collected:  05/19/20 1501    Specimen:  Urine, Clean Catch Updated:  05/19/20 1512     RBC, UA 0-2 /HPF      WBC, UA 0-2 /HPF      Bacteria, UA None Seen /HPF      Squamous Epithelial Cells, UA 0-2 /HPF      Hyaline Casts, UA None Seen /LPF      Methodology Automated Microscopy          I ordered the above labs and reviewed the results        PROCEDURES  Procedures      PROGRESS AND CONSULTS     Patient in the emergency department states he  does not need to urinate, unable to produce any urine, with bladder scan showing less than 100 cc of urine in his bladder.    Patient voices understanding of need to follow closely with PCP for further testing, treatment as needed.  Patient understand that that it is his responsibility to call make an appointment for follow-up.      MEDICAL DECISION MAKING  Results were reviewed/discussed with the patient and they were also made aware of online access. Pt also made aware that some labs, such as cultures, will not be resulted during ER visit and follow up with PMD is necessary.     MDM       DIAGNOSIS  Final diagnoses:   Acute gastritis, presence of bleeding unspecified, unspecified gastritis type   Epigastric pain   Constipation, unspecified constipation type   Essential hypertension       DISPOSITION  DISCHARGE    Patient discharged in stable condition.    Reviewed implications of results, diagnosis, meds, responsibility to follow up, warning signs and symptoms of possible worsening, potential complications and reasons to return to ER    Patient/Family voiced understanding of above instructions.    Discussed plan for discharge, as there is no emergent indication for admission. Patient referred to primary care provider for BP management due to today's BP. Pt/family is agreeable and understands need for follow up and repeat testing.  Pt is aware that discharge does not mean that nothing is wrong but it indicates no emergency is present that requires admission and they must continue care with follow-up as given below or physician of their choice.     FOLLOW-UP  [facility]  [facility] [id number] [phone]  Schedule an appointment as soon as possible for a visit in 3 days      PATIENT LIAISON [facility] [address]  [phone]  Schedule an appointment as soon as possible for a visit in 3 days      Lazaro Moran MD  [address] [facility]  [address] [address]  [phone]    Schedule an appointment as soon as possible for a visit in 1 week  As needed         Medication List      New Prescriptions    pantoprazole 40 MG EC tablet  Commonly known as:  PROTONIX  Take 1 tablet by mouth Daily for 14 days.     sucralfate 1 g tablet  Commonly known as:  CARAFATE  Take 1 tablet by mouth 4 (Four) Times a Day.              Latest Documented Vital Signs:  As of 12:36  BP- (!) 144/105 HR- 92 Temp- 96.7 °F (35.9 °C) O2 sat- 100%    --  Patient was wearing facemask when I entered the room and throughout our encounter. Full protective equipment was worn throughout this patient encounter including a face mask, eye protection and gloves. Hand hygiene was performed before donning protective equipment and after removal when leaving the room.      Estela Morejon MD  05/19/20 1034

## 2020-05-19 NOTE — ED NOTES
This RN received phone call from pt's wife, Lizeth. After verification of correct patient with code given at triage, this RN explained that pt is stable and is waiting for physician re-evaluation and disposition. Explained that blood work has been completed and pt has been given oral medications to see if there is any improvement in symptoms.     Pt's wife verbalized understanding. She also reported that pt does not have insurance and she is concerned if he is given a specialistist to f/u with he will be unable to d/t no coverage.     Pt's wife may be reached at [phone] and she would like to be updated with plan of care when possible.      Ana Porras, RN  05/19/20 4920

## 2020-11-15 ENCOUNTER — APPOINTMENT (OUTPATIENT)
Dept: GENERAL RADIOLOGY | Facility: HOSPITAL | Age: 50
End: 2020-11-15

## 2020-11-15 ENCOUNTER — APPOINTMENT (OUTPATIENT)
Dept: CT IMAGING | Facility: HOSPITAL | Age: 50
End: 2020-11-15

## 2020-11-15 ENCOUNTER — APPOINTMENT (OUTPATIENT)
Dept: CARDIOLOGY | Facility: HOSPITAL | Age: 50
End: 2020-11-15

## 2020-11-15 ENCOUNTER — HOSPITAL ENCOUNTER (INPATIENT)
Facility: HOSPITAL | Age: 50
LOS: 2 days | Discharge: HOME | End: 2020-11-17
Attending: EMERGENCY MEDICINE | Admitting: INTERNAL MEDICINE

## 2020-11-15 DIAGNOSIS — I50.9 ACUTE CONGESTIVE HEART FAILURE, UNSPECIFIED HEART FAILURE TYPE (HCC): ICD-10-CM

## 2020-11-15 DIAGNOSIS — R06.02 SHORTNESS OF BREATH: ICD-10-CM

## 2020-11-15 DIAGNOSIS — J90 BILATERAL PLEURAL EFFUSION: ICD-10-CM

## 2020-11-15 DIAGNOSIS — I50.21 SYSTOLIC CHF, ACUTE (HCC): ICD-10-CM

## 2020-11-15 DIAGNOSIS — I51.7 CARDIOMEGALY: ICD-10-CM

## 2020-11-15 DIAGNOSIS — I50.21 ACUTE SYSTOLIC CONGESTIVE HEART FAILURE (HCC): Primary | ICD-10-CM

## 2020-11-15 PROBLEM — F15.10 METHAMPHETAMINE ABUSE (HCC): Status: ACTIVE | Noted: 2020-11-15

## 2020-11-15 PROBLEM — E78.5 DYSLIPIDEMIA: Status: ACTIVE | Noted: 2020-11-15

## 2020-11-15 PROBLEM — K21.9 GERD WITHOUT ESOPHAGITIS: Status: ACTIVE | Noted: 2020-11-15

## 2020-11-15 PROBLEM — F19.10 SUBSTANCE ABUSE (HCC): Status: ACTIVE | Noted: 2020-11-15

## 2020-11-15 PROBLEM — Z72.0 TOBACCO ABUSE: Status: ACTIVE | Noted: 2020-11-15

## 2020-11-15 PROBLEM — I10 ESSENTIAL HYPERTENSION: Status: ACTIVE | Noted: 2020-11-15

## 2020-11-15 PROBLEM — F19.10 IV DRUG ABUSE: Status: ACTIVE | Noted: 2020-11-15

## 2020-11-15 LAB
ALBUMIN SERPL-MCNC: 3.7 G/DL (ref 3.5–5.2)
ALBUMIN SERPL-MCNC: 3.8 G/DL (ref 3.5–5.2)
ALBUMIN/GLOB SERPL: 1.4 G/DL
ALBUMIN/GLOB SERPL: 1.5 G/DL
ALP SERPL-CCNC: 82 U/L (ref 39–117)
ALP SERPL-CCNC: 85 U/L (ref 39–117)
ALT SERPL W P-5'-P-CCNC: 17 U/L (ref 1–41)
ALT SERPL W P-5'-P-CCNC: 19 U/L (ref 1–41)
AMPHET+METHAMPHET UR QL: POSITIVE
ANION GAP SERPL CALCULATED.3IONS-SCNC: 10.6 MMOL/L (ref 5–15)
ANION GAP SERPL CALCULATED.3IONS-SCNC: 12.2 MMOL/L (ref 5–15)
ASCENDING AORTA: 2.7 CM
AST SERPL-CCNC: 20 U/L (ref 1–40)
AST SERPL-CCNC: 20 U/L (ref 1–40)
B PARAPERT DNA SPEC QL NAA+PROBE: NOT DETECTED
B PERT DNA SPEC QL NAA+PROBE: NOT DETECTED
BACTERIA UR QL AUTO: NORMAL /HPF
BARBITURATES UR QL SCN: NEGATIVE
BASOPHILS # BLD AUTO: 0.08 10*3/MM3 (ref 0–0.2)
BASOPHILS # BLD AUTO: 0.08 10*3/MM3 (ref 0–0.2)
BASOPHILS NFR BLD AUTO: 0.9 % (ref 0–1.5)
BASOPHILS NFR BLD AUTO: 0.9 % (ref 0–1.5)
BENZODIAZ UR QL SCN: NEGATIVE
BH CV ECHO MEAS - ACS: 1.9 CM
BH CV ECHO MEAS - AI DEC SLOPE: 109 CM/SEC^2
BH CV ECHO MEAS - AI MAX PG: 40.7 MMHG
BH CV ECHO MEAS - AI MAX VEL: 319 CM/SEC
BH CV ECHO MEAS - AI P1/2T: 857.2 MSEC
BH CV ECHO MEAS - AO MAX PG (FULL): 2.3 MMHG
BH CV ECHO MEAS - AO MAX PG: 6.9 MMHG
BH CV ECHO MEAS - AO MEAN PG (FULL): 1 MMHG
BH CV ECHO MEAS - AO MEAN PG: 3 MMHG
BH CV ECHO MEAS - AO ROOT AREA (BSA CORRECTED): 1.5
BH CV ECHO MEAS - AO ROOT AREA: 7.1 CM^2
BH CV ECHO MEAS - AO ROOT DIAM: 3 CM
BH CV ECHO MEAS - AO V2 MAX: 131 CM/SEC
BH CV ECHO MEAS - AO V2 MEAN: 83.5 CM/SEC
BH CV ECHO MEAS - AO V2 VTI: 18.1 CM
BH CV ECHO MEAS - ASC AORTA: 2.7 CM
BH CV ECHO MEAS - AVA(I,A): 2.6 CM^2
BH CV ECHO MEAS - AVA(I,D): 2.6 CM^2
BH CV ECHO MEAS - AVA(V,A): 2.6 CM^2
BH CV ECHO MEAS - AVA(V,D): 2.6 CM^2
BH CV ECHO MEAS - BSA(HAYCOCK): 2.1 M^2
BH CV ECHO MEAS - BSA: 2.1 M^2
BH CV ECHO MEAS - BZI_BMI: 29.5 KILOGRAMS/M^2
BH CV ECHO MEAS - BZI_METRIC_HEIGHT: 175.3 CM
BH CV ECHO MEAS - BZI_METRIC_WEIGHT: 90.7 KG
BH CV ECHO MEAS - CONTRAST EF (2CH): 19 CM2
BH CV ECHO MEAS - CONTRAST EF 4CH: 30 CM2
BH CV ECHO MEAS - EDV(MOD-SP2): 236 ML
BH CV ECHO MEAS - EDV(MOD-SP4): 256 ML
BH CV ECHO MEAS - EDV(TEICH): 216 ML
BH CV ECHO MEAS - EF(CUBED): 21.3 %
BH CV ECHO MEAS - EF(MOD-BP): 25 %
BH CV ECHO MEAS - EF(MOD-SP2): 19.1 %
BH CV ECHO MEAS - EF(MOD-SP4): 29.7 %
BH CV ECHO MEAS - EF(TEICH): 16.7 %
BH CV ECHO MEAS - ESV(MOD-SP2): 191 ML
BH CV ECHO MEAS - ESV(MOD-SP4): 180 ML
BH CV ECHO MEAS - ESV(TEICH): 180 ML
BH CV ECHO MEAS - FS: 7.7 %
BH CV ECHO MEAS - IVS/LVPW: 1
BH CV ECHO MEAS - IVSD: 0.8 CM
BH CV ECHO MEAS - LAT PEAK E' VEL: 8.5 CM/SEC
BH CV ECHO MEAS - LV DIASTOLIC VOL/BSA (35-75): 123.9 ML/M^2
BH CV ECHO MEAS - LV MASS(C)D: 214.3 GRAMS
BH CV ECHO MEAS - LV MASS(C)DI: 103.7 GRAMS/M^2
BH CV ECHO MEAS - LV MAX PG: 4.6 MMHG
BH CV ECHO MEAS - LV MEAN PG: 2 MMHG
BH CV ECHO MEAS - LV SYSTOLIC VOL/BSA (12-30): 87.1 ML/M^2
BH CV ECHO MEAS - LV V1 MAX: 107 CM/SEC
BH CV ECHO MEAS - LV V1 MEAN: 63.1 CM/SEC
BH CV ECHO MEAS - LV V1 VTI: 14.9 CM
BH CV ECHO MEAS - LVIDD: 6.5 CM
BH CV ECHO MEAS - LVIDS: 6 CM
BH CV ECHO MEAS - LVLD AP2: 9.1 CM
BH CV ECHO MEAS - LVLD AP4: 9.1 CM
BH CV ECHO MEAS - LVLS AP2: 8.5 CM
BH CV ECHO MEAS - LVLS AP4: 8.2 CM
BH CV ECHO MEAS - LVOT AREA (M): 3.1 CM^2
BH CV ECHO MEAS - LVOT AREA: 3.1 CM^2
BH CV ECHO MEAS - LVOT DIAM: 2 CM
BH CV ECHO MEAS - LVPWD: 0.8 CM
BH CV ECHO MEAS - MED PEAK E' VEL: 5.3 CM/SEC
BH CV ECHO MEAS - MR MAX PG: 35.8 MMHG
BH CV ECHO MEAS - MR MAX VEL: 299 CM/SEC
BH CV ECHO MEAS - MV A DUR: 0.13 SEC
BH CV ECHO MEAS - MV A MAX VEL: 45.5 CM/SEC
BH CV ECHO MEAS - MV DEC SLOPE: 892.5 CM/SEC^2
BH CV ECHO MEAS - MV DEC TIME: 0.12 SEC
BH CV ECHO MEAS - MV E MAX VEL: 101 CM/SEC
BH CV ECHO MEAS - MV E/A: 2.2
BH CV ECHO MEAS - MV MAX PG: 6 MMHG
BH CV ECHO MEAS - MV MEAN PG: 3 MMHG
BH CV ECHO MEAS - MV P1/2T MAX VEL: 122.5 CM/SEC
BH CV ECHO MEAS - MV P1/2T: 40.2 MSEC
BH CV ECHO MEAS - MV V2 MAX: 122 CM/SEC
BH CV ECHO MEAS - MV V2 MEAN: 73.2 CM/SEC
BH CV ECHO MEAS - MV V2 VTI: 17.2 CM
BH CV ECHO MEAS - MVA P1/2T LCG: 1.8 CM^2
BH CV ECHO MEAS - MVA(P1/2T): 5.5 CM^2
BH CV ECHO MEAS - MVA(VTI): 2.7 CM^2
BH CV ECHO MEAS - PA ACC TIME: 0.14 SEC
BH CV ECHO MEAS - PA MAX PG (FULL): 2.8 MMHG
BH CV ECHO MEAS - PA MAX PG: 4 MMHG
BH CV ECHO MEAS - PA PR(ACCEL): 14.2 MMHG
BH CV ECHO MEAS - PA V2 MAX: 100 CM/SEC
BH CV ECHO MEAS - PULM DIAS VEL: 37.3 CM/SEC
BH CV ECHO MEAS - PULM S/D: 1.1
BH CV ECHO MEAS - PULM SYS VEL: 39.4 CM/SEC
BH CV ECHO MEAS - PVA(V,A): 1.4 CM^2
BH CV ECHO MEAS - PVA(V,D): 1.4 CM^2
BH CV ECHO MEAS - QP/QS: 0.51
BH CV ECHO MEAS - RAP SYSTOLE: 3 MMHG
BH CV ECHO MEAS - RV MAX PG: 1.2 MMHG
BH CV ECHO MEAS - RV MEAN PG: 1 MMHG
BH CV ECHO MEAS - RV V1 MAX: 55.4 CM/SEC
BH CV ECHO MEAS - RV V1 MEAN: 34.9 CM/SEC
BH CV ECHO MEAS - RV V1 VTI: 9.4 CM
BH CV ECHO MEAS - RVOT AREA: 2.5 CM^2
BH CV ECHO MEAS - RVOT DIAM: 1.8 CM
BH CV ECHO MEAS - RVSP: 44.5 MMHG
BH CV ECHO MEAS - SI(AO): 61.9 ML/M^2
BH CV ECHO MEAS - SI(CUBED): 28.4 ML/M^2
BH CV ECHO MEAS - SI(LVOT): 22.7 ML/M^2
BH CV ECHO MEAS - SI(MOD-SP2): 21.8 ML/M^2
BH CV ECHO MEAS - SI(MOD-SP4): 36.8 ML/M^2
BH CV ECHO MEAS - SI(TEICH): 17.4 ML/M^2
BH CV ECHO MEAS - SV(AO): 127.9 ML
BH CV ECHO MEAS - SV(CUBED): 58.6 ML
BH CV ECHO MEAS - SV(LVOT): 46.8 ML
BH CV ECHO MEAS - SV(MOD-SP2): 45 ML
BH CV ECHO MEAS - SV(MOD-SP4): 76 ML
BH CV ECHO MEAS - SV(RVOT): 24 ML
BH CV ECHO MEAS - SV(TEICH): 36 ML
BH CV ECHO MEAS - TAPSE (>1.6): 2.1 CM
BH CV ECHO MEAS - TR MAX VEL: 322 CM/SEC
BH CV ECHO MEASUREMENTS AVERAGE E/E' RATIO: 14.64
BH CV XLRA - RV BASE: 4.1 CM
BH CV XLRA - RV LENGTH: 7.6 CM
BH CV XLRA - RV MID: 3.2 CM
BH CV XLRA - TDI S': 11 CM/SEC
BILIRUB SERPL-MCNC: 0.3 MG/DL (ref 0–1.2)
BILIRUB SERPL-MCNC: 0.5 MG/DL (ref 0–1.2)
BILIRUB UR QL STRIP: NEGATIVE
BUN SERPL-MCNC: 16 MG/DL (ref 6–20)
BUN SERPL-MCNC: 16 MG/DL (ref 6–20)
BUN/CREAT SERPL: 13.3 (ref 7–25)
BUN/CREAT SERPL: 15.5 (ref 7–25)
C PNEUM DNA NPH QL NAA+NON-PROBE: NOT DETECTED
CALCIUM SPEC-SCNC: 8.8 MG/DL (ref 8.6–10.5)
CALCIUM SPEC-SCNC: 8.9 MG/DL (ref 8.6–10.5)
CANNABINOIDS SERPL QL: NEGATIVE
CHLORIDE SERPL-SCNC: 100 MMOL/L (ref 98–107)
CHLORIDE SERPL-SCNC: 103 MMOL/L (ref 98–107)
CK SERPL-CCNC: 110 U/L (ref 20–200)
CLARITY UR: CLEAR
CO2 SERPL-SCNC: 25.4 MMOL/L (ref 22–29)
CO2 SERPL-SCNC: 29.8 MMOL/L (ref 22–29)
COCAINE UR QL: NEGATIVE
COLOR UR: YELLOW
CREAT SERPL-MCNC: 1.03 MG/DL (ref 0.76–1.27)
CREAT SERPL-MCNC: 1.2 MG/DL (ref 0.76–1.27)
D DIMER PPP FEU-MCNC: 1.63 MCGFEU/ML (ref 0–0.49)
DEPRECATED RDW RBC AUTO: 40.1 FL (ref 37–54)
DEPRECATED RDW RBC AUTO: 40.4 FL (ref 37–54)
EOSINOPHIL # BLD AUTO: 0.11 10*3/MM3 (ref 0–0.4)
EOSINOPHIL # BLD AUTO: 0.12 10*3/MM3 (ref 0–0.4)
EOSINOPHIL NFR BLD AUTO: 1.3 % (ref 0.3–6.2)
EOSINOPHIL NFR BLD AUTO: 1.3 % (ref 0.3–6.2)
ERYTHROCYTE [DISTWIDTH] IN BLOOD BY AUTOMATED COUNT: 12.3 % (ref 12.3–15.4)
ERYTHROCYTE [DISTWIDTH] IN BLOOD BY AUTOMATED COUNT: 12.4 % (ref 12.3–15.4)
ETHANOL BLD-MCNC: <10 MG/DL (ref 0–10)
ETHANOL UR QL: <0.01 %
FLUAV SUBTYP SPEC NAA+PROBE: NOT DETECTED
FLUBV RNA ISLT QL NAA+PROBE: NOT DETECTED
GFR SERPL CREATININE-BSD FRML MDRD: 64 ML/MIN/1.73
GFR SERPL CREATININE-BSD FRML MDRD: 76 ML/MIN/1.73
GLOBULIN UR ELPH-MCNC: 2.5 GM/DL
GLOBULIN UR ELPH-MCNC: 2.6 GM/DL
GLUCOSE SERPL-MCNC: 104 MG/DL (ref 65–99)
GLUCOSE SERPL-MCNC: 91 MG/DL (ref 65–99)
GLUCOSE UR STRIP-MCNC: NEGATIVE MG/DL
HADV DNA SPEC NAA+PROBE: NOT DETECTED
HAV IGM SERPL QL IA: NORMAL
HBV CORE IGM SERPL QL IA: NORMAL
HBV SURFACE AG SERPL QL IA: NORMAL
HCOV 229E RNA SPEC QL NAA+PROBE: NOT DETECTED
HCOV HKU1 RNA SPEC QL NAA+PROBE: NOT DETECTED
HCOV NL63 RNA SPEC QL NAA+PROBE: NOT DETECTED
HCOV OC43 RNA SPEC QL NAA+PROBE: NOT DETECTED
HCT VFR BLD AUTO: 35.5 % (ref 37.5–51)
HCT VFR BLD AUTO: 36.5 % (ref 37.5–51)
HCV AB SER DONR QL: NORMAL
HGB BLD-MCNC: 11.9 G/DL (ref 13–17.7)
HGB BLD-MCNC: 11.9 G/DL (ref 13–17.7)
HGB UR QL STRIP.AUTO: NEGATIVE
HIV1+2 AB SER QL: NORMAL
HMPV RNA NPH QL NAA+NON-PROBE: NOT DETECTED
HOLD SPECIMEN: NORMAL
HOLD SPECIMEN: NORMAL
HPIV1 RNA SPEC QL NAA+PROBE: NOT DETECTED
HPIV2 RNA SPEC QL NAA+PROBE: NOT DETECTED
HPIV3 RNA NPH QL NAA+PROBE: NOT DETECTED
HPIV4 P GENE NPH QL NAA+PROBE: NOT DETECTED
HYALINE CASTS UR QL AUTO: NORMAL /LPF
IMM GRANULOCYTES # BLD AUTO: 0.03 10*3/MM3 (ref 0–0.05)
IMM GRANULOCYTES # BLD AUTO: 0.04 10*3/MM3 (ref 0–0.05)
IMM GRANULOCYTES NFR BLD AUTO: 0.3 % (ref 0–0.5)
IMM GRANULOCYTES NFR BLD AUTO: 0.5 % (ref 0–0.5)
INR PPP: 1.23 (ref 0.9–1.1)
KETONES UR QL STRIP: NEGATIVE
LEFT ATRIUM VOLUME INDEX: 47 ML/M2
LEUKOCYTE ESTERASE UR QL STRIP.AUTO: NEGATIVE
LYMPHOCYTES # BLD AUTO: 2 10*3/MM3 (ref 0.7–3.1)
LYMPHOCYTES # BLD AUTO: 2.14 10*3/MM3 (ref 0.7–3.1)
LYMPHOCYTES NFR BLD AUTO: 21.3 % (ref 19.6–45.3)
LYMPHOCYTES NFR BLD AUTO: 25 % (ref 19.6–45.3)
M PNEUMO IGG SER IA-ACNC: NOT DETECTED
MAGNESIUM SERPL-MCNC: 1.9 MG/DL (ref 1.6–2.6)
MAGNESIUM SERPL-MCNC: 2 MG/DL (ref 1.6–2.6)
MAXIMAL PREDICTED HEART RATE: 170 BPM
MCH RBC QN AUTO: 29.8 PG (ref 26.6–33)
MCH RBC QN AUTO: 30 PG (ref 26.6–33)
MCHC RBC AUTO-ENTMCNC: 32.6 G/DL (ref 31.5–35.7)
MCHC RBC AUTO-ENTMCNC: 33.5 G/DL (ref 31.5–35.7)
MCV RBC AUTO: 89.4 FL (ref 79–97)
MCV RBC AUTO: 91.3 FL (ref 79–97)
METHADONE UR QL SCN: NEGATIVE
MONOCYTES # BLD AUTO: 0.96 10*3/MM3 (ref 0.1–0.9)
MONOCYTES # BLD AUTO: 1.03 10*3/MM3 (ref 0.1–0.9)
MONOCYTES NFR BLD AUTO: 11 % (ref 5–12)
MONOCYTES NFR BLD AUTO: 11.2 % (ref 5–12)
NEUTROPHILS NFR BLD AUTO: 5.24 10*3/MM3 (ref 1.7–7)
NEUTROPHILS NFR BLD AUTO: 6.14 10*3/MM3 (ref 1.7–7)
NEUTROPHILS NFR BLD AUTO: 61.1 % (ref 42.7–76)
NEUTROPHILS NFR BLD AUTO: 65.2 % (ref 42.7–76)
NITRITE UR QL STRIP: NEGATIVE
NRBC BLD AUTO-RTO: 0 /100 WBC (ref 0–0.2)
NRBC BLD AUTO-RTO: 0 /100 WBC (ref 0–0.2)
NT-PROBNP SERPL-MCNC: 2759 PG/ML (ref 0–900)
OPIATES UR QL: NEGATIVE
OXYCODONE UR QL SCN: NEGATIVE
PH UR STRIP.AUTO: <=5 [PH] (ref 5–8)
PHOSPHATE SERPL-MCNC: 4.3 MG/DL (ref 2.5–4.5)
PLATELET # BLD AUTO: 151 10*3/MM3 (ref 140–450)
PLATELET # BLD AUTO: 168 10*3/MM3 (ref 140–450)
PMV BLD AUTO: 10.8 FL (ref 6–12)
PMV BLD AUTO: 11.1 FL (ref 6–12)
POTASSIUM SERPL-SCNC: 4 MMOL/L (ref 3.5–5.2)
POTASSIUM SERPL-SCNC: 4.1 MMOL/L (ref 3.5–5.2)
PROT SERPL-MCNC: 6.3 G/DL (ref 6–8.5)
PROT SERPL-MCNC: 6.3 G/DL (ref 6–8.5)
PROT UR QL STRIP: ABNORMAL
PROTHROMBIN TIME: 15.3 SECONDS (ref 11.7–14.2)
QT INTERVAL: 360 MS
RBC # BLD AUTO: 3.97 10*6/MM3 (ref 4.14–5.8)
RBC # BLD AUTO: 4 10*6/MM3 (ref 4.14–5.8)
RBC # UR: NORMAL /HPF
REF LAB TEST METHOD: NORMAL
RHINOVIRUS RNA SPEC NAA+PROBE: NOT DETECTED
RSV RNA NPH QL NAA+NON-PROBE: NOT DETECTED
SARS-COV-2 RNA NPH QL NAA+NON-PROBE: NOT DETECTED
SINUS: 2.8 CM
SODIUM SERPL-SCNC: 139 MMOL/L (ref 136–145)
SODIUM SERPL-SCNC: 142 MMOL/L (ref 136–145)
SP GR UR STRIP: 1.02 (ref 1–1.03)
SQUAMOUS #/AREA URNS HPF: NORMAL /HPF
STJ: 2.5 CM
STRESS TARGET HR: 145 BPM
T4 FREE SERPL-MCNC: 1.22 NG/DL (ref 0.93–1.7)
TROPONIN T SERPL-MCNC: <0.01 NG/ML (ref 0–0.03)
TROPONIN T SERPL-MCNC: <0.01 NG/ML (ref 0–0.03)
TSH SERPL DL<=0.05 MIU/L-ACNC: 2.09 UIU/ML (ref 0.27–4.2)
TSH SERPL DL<=0.05 MIU/L-ACNC: 2.14 UIU/ML (ref 0.27–4.2)
UROBILINOGEN UR QL STRIP: ABNORMAL
WBC # BLD AUTO: 8.57 10*3/MM3 (ref 3.4–10.8)
WBC # BLD AUTO: 9.4 10*3/MM3 (ref 3.4–10.8)
WBC UR QL AUTO: NORMAL /HPF
WHOLE BLOOD HOLD SPECIMEN: NORMAL
WHOLE BLOOD HOLD SPECIMEN: NORMAL

## 2020-11-15 PROCEDURE — 83735 ASSAY OF MAGNESIUM: CPT | Performed by: EMERGENCY MEDICINE

## 2020-11-15 PROCEDURE — 93306 TTE W/DOPPLER COMPLETE: CPT

## 2020-11-15 PROCEDURE — 80307 DRUG TEST PRSMV CHEM ANLYZR: CPT | Performed by: EMERGENCY MEDICINE

## 2020-11-15 PROCEDURE — 25010000002 PERFLUTREN (DEFINITY) 8.476 MG IN SODIUM CHLORIDE 0.9 % 10 ML INJECTION: Performed by: NURSE PRACTITIONER

## 2020-11-15 PROCEDURE — 80053 COMPREHEN METABOLIC PANEL: CPT | Performed by: EMERGENCY MEDICINE

## 2020-11-15 PROCEDURE — 84443 ASSAY THYROID STIM HORMONE: CPT | Performed by: EMERGENCY MEDICINE

## 2020-11-15 PROCEDURE — 84484 ASSAY OF TROPONIN QUANT: CPT | Performed by: EMERGENCY MEDICINE

## 2020-11-15 PROCEDURE — 85379 FIBRIN DEGRADATION QUANT: CPT | Performed by: EMERGENCY MEDICINE

## 2020-11-15 PROCEDURE — 0 IOPAMIDOL PER 1 ML: Performed by: EMERGENCY MEDICINE

## 2020-11-15 PROCEDURE — 25010000002 FUROSEMIDE PER 20 MG: Performed by: EMERGENCY MEDICINE

## 2020-11-15 PROCEDURE — 83880 ASSAY OF NATRIURETIC PEPTIDE: CPT | Performed by: EMERGENCY MEDICINE

## 2020-11-15 PROCEDURE — 83735 ASSAY OF MAGNESIUM: CPT | Performed by: NURSE PRACTITIONER

## 2020-11-15 PROCEDURE — 93306 TTE W/DOPPLER COMPLETE: CPT | Performed by: INTERNAL MEDICINE

## 2020-11-15 PROCEDURE — 84100 ASSAY OF PHOSPHORUS: CPT | Performed by: NURSE PRACTITIONER

## 2020-11-15 PROCEDURE — 25010000002 FUROSEMIDE PER 20 MG: Performed by: INTERNAL MEDICINE

## 2020-11-15 PROCEDURE — 71275 CT ANGIOGRAPHY CHEST: CPT

## 2020-11-15 PROCEDURE — 85025 COMPLETE CBC W/AUTO DIFF WBC: CPT | Performed by: NURSE PRACTITIONER

## 2020-11-15 PROCEDURE — G0432 EIA HIV-1/HIV-2 SCREEN: HCPCS | Performed by: NURSE PRACTITIONER

## 2020-11-15 PROCEDURE — 99284 EMERGENCY DEPT VISIT MOD MDM: CPT

## 2020-11-15 PROCEDURE — 82550 ASSAY OF CK (CPK): CPT | Performed by: INTERNAL MEDICINE

## 2020-11-15 PROCEDURE — 36415 COLL VENOUS BLD VENIPUNCTURE: CPT | Performed by: NURSE PRACTITIONER

## 2020-11-15 PROCEDURE — 71045 X-RAY EXAM CHEST 1 VIEW: CPT

## 2020-11-15 PROCEDURE — 99152 MOD SED SAME PHYS/QHP 5/>YRS: CPT | Performed by: INTERNAL MEDICINE

## 2020-11-15 PROCEDURE — 84484 ASSAY OF TROPONIN QUANT: CPT | Performed by: NURSE PRACTITIONER

## 2020-11-15 PROCEDURE — 84443 ASSAY THYROID STIM HORMONE: CPT | Performed by: NURSE PRACTITIONER

## 2020-11-15 PROCEDURE — 0202U NFCT DS 22 TRGT SARS-COV-2: CPT | Performed by: EMERGENCY MEDICINE

## 2020-11-15 PROCEDURE — 99254 IP/OBS CNSLTJ NEW/EST MOD 60: CPT | Performed by: INTERNAL MEDICINE

## 2020-11-15 PROCEDURE — 80053 COMPREHEN METABOLIC PANEL: CPT | Performed by: NURSE PRACTITIONER

## 2020-11-15 PROCEDURE — 85025 COMPLETE CBC W/AUTO DIFF WBC: CPT | Performed by: EMERGENCY MEDICINE

## 2020-11-15 PROCEDURE — 81001 URINALYSIS AUTO W/SCOPE: CPT | Performed by: EMERGENCY MEDICINE

## 2020-11-15 PROCEDURE — 85610 PROTHROMBIN TIME: CPT | Performed by: EMERGENCY MEDICINE

## 2020-11-15 PROCEDURE — 80074 ACUTE HEPATITIS PANEL: CPT | Performed by: NURSE PRACTITIONER

## 2020-11-15 PROCEDURE — 93010 ELECTROCARDIOGRAM REPORT: CPT | Performed by: INTERNAL MEDICINE

## 2020-11-15 PROCEDURE — 84439 ASSAY OF FREE THYROXINE: CPT | Performed by: EMERGENCY MEDICINE

## 2020-11-15 PROCEDURE — 93005 ELECTROCARDIOGRAM TRACING: CPT | Performed by: EMERGENCY MEDICINE

## 2020-11-15 RX ORDER — SODIUM CHLORIDE 0.9 % (FLUSH) 0.9 %
10 SYRINGE (ML) INJECTION AS NEEDED
Status: DISCONTINUED | OUTPATIENT
Start: 2020-11-15 | End: 2020-11-17 | Stop reason: HOSPADM

## 2020-11-15 RX ORDER — ACETAMINOPHEN 650 MG/1
650 SUPPOSITORY RECTAL EVERY 4 HOURS PRN
Status: DISCONTINUED | OUTPATIENT
Start: 2020-11-15 | End: 2020-11-17 | Stop reason: HOSPADM

## 2020-11-15 RX ORDER — NITROGLYCERIN 0.4 MG/1
0.4 TABLET SUBLINGUAL
Status: DISCONTINUED | OUTPATIENT
Start: 2020-11-15 | End: 2020-11-17 | Stop reason: HOSPADM

## 2020-11-15 RX ORDER — CARVEDILOL 3.12 MG/1
3.12 TABLET ORAL 2 TIMES DAILY WITH MEALS
Status: DISCONTINUED | OUTPATIENT
Start: 2020-11-15 | End: 2020-11-17 | Stop reason: HOSPADM

## 2020-11-15 RX ORDER — POLYETHYLENE GLYCOL 3350 17 G/17G
17 POWDER, FOR SOLUTION ORAL DAILY
Status: DISCONTINUED | OUTPATIENT
Start: 2020-11-15 | End: 2020-11-15

## 2020-11-15 RX ORDER — POTASSIUM CHLORIDE 7.45 MG/ML
10 INJECTION INTRAVENOUS
Status: DISCONTINUED | OUTPATIENT
Start: 2020-11-15 | End: 2020-11-17 | Stop reason: HOSPADM

## 2020-11-15 RX ORDER — POTASSIUM CHLORIDE 750 MG/1
20 TABLET, FILM COATED, EXTENDED RELEASE ORAL 2 TIMES DAILY WITH MEALS
Status: DISCONTINUED | OUTPATIENT
Start: 2020-11-15 | End: 2020-11-17 | Stop reason: HOSPADM

## 2020-11-15 RX ORDER — ONDANSETRON 2 MG/ML
4 INJECTION INTRAMUSCULAR; INTRAVENOUS EVERY 6 HOURS PRN
Status: DISCONTINUED | OUTPATIENT
Start: 2020-11-15 | End: 2020-11-17 | Stop reason: HOSPADM

## 2020-11-15 RX ORDER — FUROSEMIDE 10 MG/ML
40 INJECTION INTRAMUSCULAR; INTRAVENOUS EVERY 12 HOURS
Status: DISCONTINUED | OUTPATIENT
Start: 2020-11-15 | End: 2020-11-17

## 2020-11-15 RX ORDER — ACETAMINOPHEN 160 MG/5ML
650 SOLUTION ORAL EVERY 4 HOURS PRN
Status: DISCONTINUED | OUTPATIENT
Start: 2020-11-15 | End: 2020-11-17 | Stop reason: HOSPADM

## 2020-11-15 RX ORDER — ASPIRIN 325 MG
325 TABLET ORAL ONCE
Status: COMPLETED | OUTPATIENT
Start: 2020-11-15 | End: 2020-11-15

## 2020-11-15 RX ORDER — POTASSIUM CHLORIDE 750 MG/1
40 TABLET, FILM COATED, EXTENDED RELEASE ORAL AS NEEDED
Status: DISCONTINUED | OUTPATIENT
Start: 2020-11-15 | End: 2020-11-17 | Stop reason: HOSPADM

## 2020-11-15 RX ORDER — FUROSEMIDE 10 MG/ML
40 INJECTION INTRAMUSCULAR; INTRAVENOUS EVERY 12 HOURS
Status: DISCONTINUED | OUTPATIENT
Start: 2020-11-15 | End: 2020-11-15

## 2020-11-15 RX ORDER — ACETAMINOPHEN 325 MG/1
650 TABLET ORAL EVERY 4 HOURS PRN
Status: DISCONTINUED | OUTPATIENT
Start: 2020-11-15 | End: 2020-11-17 | Stop reason: HOSPADM

## 2020-11-15 RX ORDER — POTASSIUM CHLORIDE 1.5 G/1.77G
40 POWDER, FOR SOLUTION ORAL AS NEEDED
Status: DISCONTINUED | OUTPATIENT
Start: 2020-11-15 | End: 2020-11-17 | Stop reason: HOSPADM

## 2020-11-15 RX ORDER — FUROSEMIDE 10 MG/ML
40 INJECTION INTRAMUSCULAR; INTRAVENOUS ONCE
Status: COMPLETED | OUTPATIENT
Start: 2020-11-15 | End: 2020-11-15

## 2020-11-15 RX ORDER — NICOTINE 21 MG/24HR
1 PATCH, TRANSDERMAL 24 HOURS TRANSDERMAL
Status: DISCONTINUED | OUTPATIENT
Start: 2020-11-15 | End: 2020-11-17 | Stop reason: HOSPADM

## 2020-11-15 RX ORDER — GABAPENTIN 300 MG/1
300 CAPSULE ORAL 3 TIMES DAILY
Status: DISCONTINUED | OUTPATIENT
Start: 2020-11-15 | End: 2020-11-17 | Stop reason: HOSPADM

## 2020-11-15 RX ORDER — FAMOTIDINE 10 MG/ML
20 INJECTION, SOLUTION INTRAVENOUS EVERY 12 HOURS SCHEDULED
Status: DISCONTINUED | OUTPATIENT
Start: 2020-11-15 | End: 2020-11-17 | Stop reason: HOSPADM

## 2020-11-15 RX ORDER — DIAZEPAM 2 MG/1
2 TABLET ORAL ONCE
Status: COMPLETED | OUTPATIENT
Start: 2020-11-15 | End: 2020-11-15

## 2020-11-15 RX ORDER — SUCRALFATE 1 G/1
1 TABLET ORAL 4 TIMES DAILY
Status: DISCONTINUED | OUTPATIENT
Start: 2020-11-15 | End: 2020-11-15

## 2020-11-15 RX ORDER — SODIUM CHLORIDE 0.9 % (FLUSH) 0.9 %
10 SYRINGE (ML) INJECTION EVERY 12 HOURS SCHEDULED
Status: DISCONTINUED | OUTPATIENT
Start: 2020-11-15 | End: 2020-11-17 | Stop reason: HOSPADM

## 2020-11-15 RX ADMIN — POTASSIUM CHLORIDE 20 MEQ: 750 TABLET, EXTENDED RELEASE ORAL at 17:03

## 2020-11-15 RX ADMIN — PERFLUTREN 1 ML: 6.52 INJECTION, SUSPENSION INTRAVENOUS at 10:10

## 2020-11-15 RX ADMIN — IOPAMIDOL 100 ML: 755 INJECTION, SOLUTION INTRAVENOUS at 04:57

## 2020-11-15 RX ADMIN — GABAPENTIN 300 MG: 300 CAPSULE ORAL at 16:59

## 2020-11-15 RX ADMIN — ACETAMINOPHEN 650 MG: 325 TABLET, FILM COATED ORAL at 13:47

## 2020-11-15 RX ADMIN — CARVEDILOL 3.12 MG: 3.12 TABLET, FILM COATED ORAL at 08:29

## 2020-11-15 RX ADMIN — SODIUM CHLORIDE, PRESERVATIVE FREE 10 ML: 5 INJECTION INTRAVENOUS at 08:30

## 2020-11-15 RX ADMIN — NITROGLYCERIN 1 INCH: 20 OINTMENT TOPICAL at 04:40

## 2020-11-15 RX ADMIN — CARVEDILOL 3.12 MG: 3.12 TABLET, FILM COATED ORAL at 17:00

## 2020-11-15 RX ADMIN — FUROSEMIDE 40 MG: 10 INJECTION, SOLUTION INTRAMUSCULAR; INTRAVENOUS at 08:29

## 2020-11-15 RX ADMIN — DIAZEPAM 2 MG: 2 TABLET ORAL at 17:02

## 2020-11-15 RX ADMIN — ASPIRIN 325 MG: 325 TABLET ORAL at 04:40

## 2020-11-15 RX ADMIN — FUROSEMIDE 40 MG: 10 INJECTION, SOLUTION INTRAMUSCULAR; INTRAVENOUS at 05:39

## 2020-11-15 RX ADMIN — FUROSEMIDE 40 MG: 10 INJECTION, SOLUTION INTRAMUSCULAR; INTRAVENOUS at 21:05

## 2020-11-15 RX ADMIN — FAMOTIDINE 20 MG: 10 INJECTION INTRAVENOUS at 21:05

## 2020-11-15 RX ADMIN — SODIUM CHLORIDE, PRESERVATIVE FREE 10 ML: 5 INJECTION INTRAVENOUS at 21:06

## 2020-11-15 RX ADMIN — NICOTINE 1 PATCH: 21 PATCH, EXTENDED RELEASE TRANSDERMAL at 11:15

## 2020-11-15 RX ADMIN — GABAPENTIN 300 MG: 300 CAPSULE ORAL at 21:05

## 2020-11-15 NOTE — CONSULTS
[facility] Consult Note       Encounter Date:11/15/20  Patient:Colten Hager  :1970  MRN:[id number]    Date of Admission: 11/15/2020  Date of Encounter Visit: 11/15/20  Encounter Provider: Michael Ward MD  Referring Provider: James Castellanos MD  Place of Service: Psychiatric  Patient Care Team:  Provider, No Known as PCP - General      Consulted for: Chest pain    Chief Complaint: Shortness of breath       History of Presenting Illness:      Mr. Colten Hager is a 49 y.o gentleman with a history of hypertension and methamphetamine use who presented to our emergency room with worsening shortness of breath over the last 4 days.  Chest x-ray and laboratory findings concerning for congestive heart failure for which is why we were consulted.    Unfortunately the patient seems to not have much in the way of continued medical care.  He does have a history of hypertension and was previously on medications for this 2 years ago but stopped this as he thought that his blood pressure was better.  Unfortunately patient has been using methamphetamines for the past 3 years.  His last use was approximately 3 days ago.  He has had issues with what sounds like paroxysmal nocturnal dyspnea for almost 10 years but really over the last 4 days he has had significant worsening of shortness of breath and dyspnea on exertion.  He originally tried to reestablish with his primary care physician and had a telehealth visit on Friday in which they were concerned about him having COVID-19 and thus advised him to go to the emergency room.  He did go to [facility] emergency room but unfortunately felt like he was not getting adequate care and thus left there and came to our emergency room last night.    He was given diuretics over at [facility] emergency room and this morning when I initially saw him he was sleeping and hard to arouse but seem to be breathing comfortably.  Upon reassessment later this  morning patient again is breathing comfortably and awake.  He is laying flat in bed.  He still has some shortness of breath but significantly improved.  He was given Nitropaste to help out with his hypertension and we initiated carvedilol this morning.  His blood pressure seems to be improving.    Outside of his dyspnea on exertion he does have generalized chest tightness and heaviness which is a constant feeling but obviously worse with more activity.    He has not had any prior cardiac work-up in the past    He also complains of generalized myalgias which is been going on for a number of years    Review of Systems:  Review of Systems   Constitution: Positive for malaise/fatigue.   HENT: Negative.    Eyes: Negative.    Cardiovascular: Positive for chest pain, dyspnea on exertion and paroxysmal nocturnal dyspnea.   Respiratory: Positive for shortness of breath.    Endocrine: Negative.    Hematologic/Lymphatic: Negative.    Skin: Negative.    Musculoskeletal: Positive for myalgias.   Gastrointestinal: Negative.    Genitourinary: Negative.    Neurological: Negative.    Psychiatric/Behavioral: Negative.    Allergic/Immunologic: Negative.        Medications:    Current Facility-Administered Medications:   •  acetaminophen (TYLENOL) tablet 650 mg, 650 mg, Oral, Q4H PRN **OR** acetaminophen (TYLENOL) 160 MG/5ML solution 650 mg, 650 mg, Oral, Q4H PRN **OR** acetaminophen (TYLENOL) suppository 650 mg, 650 mg, Rectal, Q4H PRN, Sarah Wei APRN  •  carvedilol (COREG) tablet 3.125 mg, 3.125 mg, Oral, BID With Meals, Michael Ward MD, 3.125 mg at 11/15/20 0829  •  furosemide (LASIX) injection 40 mg, 40 mg, Intravenous, Q12H, Michael Ward MD, 40 mg at 11/15/20 0829  •  nicotine (NICODERM CQ) 21 MG/24HR patch 1 patch, 1 patch, Transdermal, Q24H, Michael Ward MD  •  nitroglycerin (NITROSTAT) SL tablet 0.4 mg, 0.4 mg, Sublingual, Q5 Min PRN, Sarah Wei APRN  •  ondansetron (ZOFRAN) injection 4  mg, 4 mg, Intravenous, Q6H PRN, Sarah Wei, APRN  •  potassium chloride (K-DUR,KLOR-CON) ER tablet 40 mEq, 40 mEq, Oral, PRN **OR** potassium chloride (KLOR-CON) packet 40 mEq, 40 mEq, Oral, PRN **OR** potassium chloride 10 mEq in 100 mL IVPB, 10 mEq, Intravenous, Q1H PRN, Michael Ward MD  •  [COMPLETED] Insert peripheral IV, , , Once **AND** sodium chloride 0.9 % flush 10 mL, 10 mL, Intravenous, PRN, Xander Wolfe MD  •  sodium chloride 0.9 % flush 10 mL, 10 mL, Intravenous, Q12H, Sarah Wei APRN, 10 mL at 11/15/20 0830  •  sodium chloride 0.9 % flush 10 mL, 10 mL, Intravenous, PRN, Sarah Wei, APRN    No Known Allergies    Past Medical History:   Diagnosis Date   • Cellulitis    • CHF (congestive heart failure) (CMS/[facility])    • GERD (gastroesophageal reflux disease)    • Hyperlipidemia    • Hypertension        History reviewed. No pertinent surgical history.    Social History     Socioeconomic History   • Marital status:      Spouse name: Not on file   • Number of children: Not on file   • Years of education: Not on file   • Highest education level: Not on file   Tobacco Use   • Smoking status: Current Every Day Smoker     Packs/day: 1.00   • Smokeless tobacco: Never Used   Substance and Sexual Activity   • Alcohol use: No   • Drug use: Yes     Types: Methamphetamines     Comment: last meth use 11/12/2020   • Sexual activity: Defer       History reviewed. No pertinent family history.    The following portions of the patient's history were reviewed and updated as appropriate: allergies, current medications, past family history, past medical history, past social history, past surgical history and problem list.         Objective:      Temp:  [97.4 °F (36.3 °C)-98 °F (36.7 °C)] 98 °F (36.7 °C)  Heart Rate:  [93-99] 94  Resp:  [18-24] 18  BP: (136-161)/() 136/97     Intake/Output Summary (Last 24 hours) at 11/15/2020 0900  Last data filed at 11/15/2020 0622  Gross per 24  hour   Intake --   Output 400 ml   Net -400 ml     Body mass index is 29.65 kg/m².      11/15/20  0351 11/15/20  0622   Weight: 95.3 kg (210 lb) 91.1 kg (200 lb 12.8 oz)           Physical Exam:  Constitutional: Well appearing, well developed, no acute distress   HENT: Oropharynx clear and membrane moist, poor dentition  Eyes: Normal conjunctiva, no sclera icterus.  Neck: Supple, no carotid bruit bilaterally.  Cardiovascular: Regular rate and rhythm, No Murmur, No bilateral lower extremity edema.  Pulmonary: Normal respiratory effort, normal lung sounds, no wheezing.  Abdominal: Soft, nontender, no hepatosplenomegaly, liver is non-pulsatile.  Neurological: Alert and orient x 3.   Skin: Warm, dry, no ecchymosis, no rash.  Psych: Appropriate mood and affect. Normal judgment and insight.         Lab Review:   Results from last 7 days   Lab Units 11/15/20  0357   SODIUM mmol/L 139   POTASSIUM mmol/L 4.1   CHLORIDE mmol/L 103   CO2 mmol/L 25.4   BUN mg/dL 16   CREATININE mg/dL 1.03   GLUCOSE mg/dL 104*   CALCIUM mg/dL 8.8   AST (SGOT) U/L 20   ALT (SGPT) U/L 19     Results from last 7 days   Lab Units 11/15/20  0357 11/14/20  1241   TROPONIN I ng/mL  --  0.044*   TROPONIN T ng/mL <0.010  --      Results from last 7 days   Lab Units 11/15/20  0357 11/14/20  1241   WBC 10*3/mm3 8.57 6.99   HEMOGLOBIN g/dL 11.9* 11.9*   HEMATOCRIT % 35.5* 38.1*   PLATELETS 10*3/mm3 151 144     Results from last 7 days   Lab Units 11/15/20  0357   INR  1.23*     Results from last 7 days   Lab Units 11/15/20  0357   MAGNESIUM mg/dL 1.9           Invalid input(s): LDLCALC  Results from last 7 days   Lab Units 11/15/20  0357   PROBNP pg/mL 2,759.0*     Results from last 7 days   Lab Units 11/15/20  0357   TSH uIU/mL 2.140       EKG 11/15/2020        I have reviewed his EKG above and agree with the findings demonstrating no acute ischemic changes.      Echocardiogram 11/15/2020 images reviewed by myself and final report pending:  · Global  hypokinesis with ejection fraction of approximately 25%  · Mild to moderate mitral regurgitation appears functional in nature    Chest CTA 11/15/2020:  · Cardiomegaly and interlobular septal thickening, and scattered groundglass infiltrates.  · Constellation of findings is favored to represent congestive heart failure. There is reflux of contrast material into the hepatic veins, which can be seen in the setting of right-sided heart failure.   · Bilateral pleural effusions are noted as well.       Assessment:           Acute congestive heart failure (CMS/[facility])         Plan:       Mr. Colten Hager is a 49 y.o gentleman with a history of hypertension and methamphetamine use who presented to our emergency room with worsening shortness of breath over the last 4 days.  Chest x-ray and laboratory findings concerning for congestive heart failure for which is why we were consulted.  His echocardiogram does demonstrate global hypokinesis with a severely reduced ejection fraction.  I think this likely is related to his methamphetamine use along with undertreated hypertension.  He has responded well to diuresis and afterload reduction.  We will continue carvedilol and will uptitrate the dose as tolerated.  I would recommend a cardiac catheterization tomorrow to further define hemodynamics and rule out ischemic etiology for his cardiomyopathy.  After which we can better titrate his diuretic dosing and likely add on an ARB    Acute systolic congestive heart failure:  · Unclear etiology but drug use high probability  · Cardiac catheterization tomorrow to rule out ischemic etiology  · Continue carvedilol  · Add losartan after cardiac catheterization pending findings  · We will titrate diuretics based on filling pressures at time of cardiac catheterization tomorrow we will hold for further diuresis today.    Hypertension:  · Starting carvedilol and will monitor response  · Likely add on ARB pending cardiac catheterization  findings    Tobacco and methamphetamine use:  · Educated the importance of abstaining from both of these for optimal heart health and to avoid any further deterioration of his cardiac function        Thank you for allowing me to participate in the care of Colten Hager. Feel free to contact me directly with any further questions or concerns.    Michael Ward MD  [facility]  11/15/20  09:00 EST

## 2020-11-15 NOTE — PLAN OF CARE
Goal Outcome Evaluation:  Plan of Care Reviewed With: patient  Progress: no change    VSS. Nicotine patch in place. Heart cath planned for tomorrow. Pt experiencing frequent episodes of muscle cramps. Gabapentin started to attempt to provide relief. Pt educated on risks and symptoms of meth use. Continue to monitor.

## 2020-11-15 NOTE — ED PROVIDER NOTES
" EMERGENCY DEPARTMENT ENCOUNTER    CHIEF COMPLAINT  Chief Complaint: SOA  History given by: Patient  History limited by: N/A  Room Number: 2204/1  PMD: Provider, No Known      HPI:  Pt is a 49 y.o. male who presents complaining of gradual onset of intermittent episodes of SOA that started about \"several\" days ago which has been progressively worsening since onset. He states that his SOA is aggravated by lay down flat and exertion without any alleviating factors. Pt reports of experiencing mild \"chest tightness\" yesterday which has resolved now. Pt denies fever, chills, N/V/D, CP, abd pain, urinary symptoms. He denies taking any medications for his current symptoms. He reports that he has not been taking his HTN medications for about 2 yrs. He denies PMHx of cardiac disease. He states that he never seen a cardiologist in the past and never had any cardiac workup done in the past. Per nurse, pt uses methamphetamine.      PAST MEDICAL HISTORY  Active Ambulatory Problems     Diagnosis Date Noted   • No Active Ambulatory Problems     Resolved Ambulatory Problems     Diagnosis Date Noted   • No Resolved Ambulatory Problems     Past Medical History:   Diagnosis Date   • Cellulitis    • CHF (congestive heart failure) (CMS/[facility])    • GERD (gastroesophageal reflux disease)    • Hyperlipidemia    • Hypertension        PAST SURGICAL HISTORY  History reviewed. No pertinent surgical history.    FAMILY HISTORY  History reviewed. No pertinent family history.    SOCIAL HISTORY  Social History     Socioeconomic History   • Marital status:      Spouse name: Not on file   • Number of children: Not on file   • Years of education: Not on file   • Highest education level: Not on file   Tobacco Use   • Smoking status: Current Every Day Smoker     Packs/day: 1.00   • Smokeless tobacco: Never Used   Substance and Sexual Activity   • Alcohol use: No   • Drug use: Yes     Types: Methamphetamines     Comment: last meth use 11/12/2020   • " "Sexual activity: Defer       ALLERGIES  Patient has no known allergies.    REVIEW OF SYSTEMS  Review of Systems   Constitutional: Negative for activity change, appetite change, chills and fever.   HENT: Negative for congestion and sore throat.    Eyes: Negative.    Respiratory: Positive for shortness of breath. Negative for cough.    Cardiovascular: Negative for chest pain and leg swelling.        Reports \"chest tightness\"   Gastrointestinal: Negative for abdominal pain, diarrhea, nausea and vomiting.   Endocrine: Negative.    Genitourinary: Negative for decreased urine volume and dysuria.   Musculoskeletal: Negative for neck pain.   Skin: Negative for rash and wound.   Allergic/Immunologic: Negative.    Neurological: Negative for weakness, numbness and headaches.   Hematological: Negative.    Psychiatric/Behavioral: Negative.    All other systems reviewed and are negative.      PHYSICAL EXAM  Patient does not present with complaints for COVID19. However, my scribe and I were both wearing a masks throughout any patient interaction.    ED Triage Vitals [11/15/20 0344]   Temp Heart Rate Resp BP SpO2   97.4 °F (36.3 °C) 99 24 (!) 160/117 100 %      Temp src Heart Rate Source Patient Position BP Location FiO2 (%)   -- -- -- -- --       Physical Exam   Constitutional: He is oriented to person, place, and time. He appears distressed (mild).   HENT:   Head: Normocephalic and atraumatic.   Moist mucus membrane   Eyes: Pupils are equal, round, and reactive to light. EOM are normal.   Neck: Normal range of motion. Neck supple.   Cardiovascular: Normal rate and regular rhythm.   Murmur (2/6 murmur) heard.  Rate 100 BPM   Pulmonary/Chest: He is in respiratory distress (mild). He has rhonchi (bilateral bases).   Abdominal: Soft. There is no abdominal tenderness. There is no rebound and no guarding.   Musculoskeletal: Normal range of motion.         General: Edema (2+ edema BLE) present. No tenderness (no bilateral calf " tenderness).   Neurological: He is alert and oriented to person, place, and time. He has normal sensation and normal strength.   Normal neuro exam   Skin: Skin is warm and dry.   Psychiatric: Mood and affect normal.   Nursing note and vitals reviewed.      LAB RESULTS  Lab Results (last 24 hours)     Procedure Component Value Units Date/Time    COMPREHENSIVE METABOLIC PANEL [[id number]]  (Abnormal) Collected: 11/14/20 1241     Updated: 11/15/20 0336    CBC AND DIFFERENTIAL [[id number]]  (Abnormal) Collected: 11/14/20 1241     Updated: 11/15/20 0336     WBC 6.99 10*3/uL      RBC 4.03 10*6/uL      Hemoglobin 11.9 g/dL      Hematocrit 38.1 %      MCV 94.5 fL      MCH 29.5 pg      MCHC 31.2 g/dL      RDW 12.8 %      Platelets 144 10*3/uL      MPV 10.9 fL      Differential Type Hospital CBC w/AutoDiff (arb'U)      Neutrophil Rel % 63.7 %      Lymphocyte Rel % 22.7 %      Monocyte Rel % 11.6 %      Eosinophil % 1.1 %      Basophil Rel % 0.6 %      Immature Grans % 0.3 %      nRBC 0 /100(WBC)      Neutrophils Absolute 4.45 10*3/uL      Lymphocytes Absolute 1.59 10*3/uL      Monocytes Absolute 0.81 10*3/uL      Eosinophils Absolute 0.08 10*3/uL      Basophils Absolute 0.04 10*3/uL      Immature Grans, Absolute 0.02 10*3/uL     HIV-1/O/2 ANTIGEN/ANTIBODY, 4TH GENERATION [[id number]] Collected: 11/14/20 1241     Updated: 11/15/20 0336     HIV Screen 4th Gen w/RFX (Reference) Nonreactive    TOXICOLOGY SCREEN, SERUM [[id number]]  (Abnormal) Collected: 11/14/20 1241     Updated: 11/15/20 0336    TROPONIN (IN-HOUSE) [[id number]]  (Abnormal) Collected: 11/14/20 1241    Specimen: Fresh Frozen Plasma Updated: 11/15/20 0336     Troponin I 0.044 ng/mL      Comment: (note)  Levels >0.035 are greater than the 99th percentile and suggest  possible need for clinical correlation and serial testing.       PROBNP (REFERENCE) [[id number]]  (Abnormal) Collected: 11/14/20 1241    Specimen: Fresh Frozen Plasma Updated: 11/15/20 0336     BNP 2,910.0  pg/mL      Comment: (note)  Higher dose biotin supplements may interfere with this test.  Interpret results within the context of patient's clinical picture.       CBC & Differential [[id number]]  (Abnormal) Collected: 11/15/20 0357    Specimen: Blood Updated: 11/15/20 0414    Narrative:      The following orders were created for panel order CBC & Differential.  Procedure                               Abnormality         Status                     ---------                               -----------         ------                     CBC Auto Differential[[id number]]        Abnormal            Final result                 Please view results for these tests on the individual orders.    Comprehensive Metabolic Panel [[id number]]  (Abnormal) Collected: 11/15/20 0357    Specimen: Blood Updated: 11/15/20 0438     Glucose 104 mg/dL      BUN 16 mg/dL      Creatinine 1.03 mg/dL      Sodium 139 mmol/L      Potassium 4.1 mmol/L      Chloride 103 mmol/L      CO2 25.4 mmol/L      Calcium 8.8 mg/dL      Total Protein 6.3 g/dL      Albumin 3.80 g/dL      ALT (SGPT) 19 U/L      AST (SGOT) 20 U/L      Alkaline Phosphatase 82 U/L      Total Bilirubin 0.3 mg/dL      eGFR Non African Amer 76 mL/min/1.73      Globulin 2.5 gm/dL      A/G Ratio 1.5 g/dL      BUN/Creatinine Ratio 15.5     Anion Gap 10.6 mmol/L     Narrative:      GFR Normal >60  Chronic Kidney Disease <60  Kidney Failure <15      Protime-INR [[id number]]  (Abnormal) Collected: 11/15/20 0357    Specimen: Blood Updated: 11/15/20 0430     Protime 15.3 Seconds      INR 1.23    BNP [[id number]]  (Abnormal) Collected: 11/15/20 0357    Specimen: Blood Updated: 11/15/20 0439     proBNP 2,759.0 pg/mL     Narrative:      Among patients with dyspnea, NT-proBNP is highly sensitive for the detection of acute congestive heart failure. In addition NT-proBNP of <300 pg/ml effectively rules out acute congestive heart failure with 99% negative predictive value.    Results may be falsely  decreased if patient taking Biotin.      Troponin [[id number]]  (Normal) Collected: 11/15/20 0357    Specimen: Blood Updated: 11/15/20 0439     Troponin T <0.010 ng/mL     Narrative:      Troponin T Reference Range:  <= 0.03 ng/mL-   Negative for AMI  >0.03 ng/mL-     Abnormal for myocardial necrosis.  Clinicians would have to utilize clinical acumen, EKG, Troponin and serial changes to determine if it is an Acute Myocardial Infarction or myocardial injury due to an underlying chronic condition.       Results may be falsely decreased if patient taking Biotin.      Ethanol [[id number]] Collected: 11/15/20 0357    Specimen: Blood Updated: 11/15/20 0438     Ethanol <10 mg/dL      Ethanol % <0.010 %     Magnesium [[id number]]  (Normal) Collected: 11/15/20 0357    Specimen: Blood Updated: 11/15/20 0438     Magnesium 1.9 mg/dL     TSH [[id number]]  (Normal) Collected: 11/15/20 0357    Specimen: Blood Updated: 11/15/20 0527     TSH 2.140 uIU/mL     T4, Free [[id number]]  (Normal) Collected: 11/15/20 0357    Specimen: Blood Updated: 11/15/20 0527     Free T4 1.22 ng/dL     Narrative:      Results may be falsely increased if patient taking Biotin.      CBC Auto Differential [[id number]]  (Abnormal) Collected: 11/15/20 0357    Specimen: Blood Updated: 11/15/20 0414     WBC 8.57 10*3/mm3      RBC 3.97 10*6/mm3      Hemoglobin 11.9 g/dL      Hematocrit 35.5 %      MCV 89.4 fL      MCH 30.0 pg      MCHC 33.5 g/dL      RDW 12.3 %      RDW-SD 40.1 fl      MPV 11.1 fL      Platelets 151 10*3/mm3      Neutrophil % 61.1 %      Lymphocyte % 25.0 %      Monocyte % 11.2 %      Eosinophil % 1.3 %      Basophil % 0.9 %      Immature Grans % 0.5 %      Neutrophils, Absolute 5.24 10*3/mm3      Lymphocytes, Absolute 2.14 10*3/mm3      Monocytes, Absolute 0.96 10*3/mm3      Eosinophils, Absolute 0.11 10*3/mm3      Basophils, Absolute 0.08 10*3/mm3      Immature Grans, Absolute 0.04 10*3/mm3      nRBC 0.0 /100 WBC     D-dimer, Quantitative  [[id number]]  (Abnormal) Collected: 11/15/20 0357    Specimen: Blood Updated: 11/15/20 0430     D-Dimer, Quantitative 1.63 MCGFEU/mL     Narrative:      The [employer] D-Dimer test used in conjunction with a clinical pretest probability (PTP) assessment model, has been approved by the FDA to rule out the presence of venous thromboembolism (VTE) in outpatients suspected of deep venous thrombosis (DVT) or pulmonary embolism (PE). The cut-off for negative predictive value is <0.50 MCGFEU/mL.    Urinalysis With Microscopic If Indicated (No Culture) - Urine, Clean Catch [[id number]]  (Abnormal) Collected: 11/15/20 0410    Specimen: Urine, Clean Catch Updated: 11/15/20 0436     Color, UA Yellow     Appearance, UA Clear     pH, UA <=5.0     Specific Gravity, UA 1.019     Glucose, UA Negative     Ketones, UA Negative     Bilirubin, UA Negative     Blood, UA Negative     Protein, UA 30 mg/dL (1+)     Leuk Esterase, UA Negative     Nitrite, UA Negative     Urobilinogen, UA 0.2 E.U./dL    COVID PRE-OP / PRE-PROCEDURE SCREENING ORDER (NO ISOLATION) - Swab, Nasopharynx [[id number]]  (Normal) Collected: 11/15/20 0410    Specimen: Swab from Nasopharynx Updated: 11/15/20 0523    Narrative:      The following orders were created for panel order COVID PRE-OP / PRE-PROCEDURE SCREENING ORDER (NO ISOLATION) - Swab, Nasopharynx.  Procedure                               Abnormality         Status                     ---------                               -----------         ------                     Respiratory Panel PCR w/...[[id number]]  Normal              Final result                 Please view results for these tests on the individual orders.    Urine Drug Screen - Urine, Clean Catch [[id number]]  (Abnormal) Collected: 11/15/20 0410    Specimen: Urine, Clean Catch Updated: 11/15/20 0509     Amphet/Methamphet, Screen Positive     Barbiturates Screen, Urine Negative     Benzodiazepine Screen, Urine Negative     Cocaine Screen, Urine Negative      Opiate Screen Negative     THC, Screen, Urine Negative     Methadone Screen, Urine Negative     Oxycodone Screen, Urine Negative    Narrative:      Negative Thresholds For Drugs Screened:     Amphetamines               500 ng/ml   Barbiturates               200 ng/ml   Benzodiazepines            100 ng/ml   Cocaine                    300 ng/ml   Methadone                  300 ng/ml   Opiates                    300 ng/ml   Oxycodone                  100 ng/ml   THC                        50 ng/ml    The Normal Value for all drugs tested is negative. This report includes final unconfirmed screening results to be used for medical treatment purposes only. Unconfirmed results must not be used for non-medical purposes such as employment or legal testing. Clinical consideration should be applied to any drug of abuse test, particulary when unconfirmed results are used.    Respiratory Panel PCR w/COVID-19(SARS-CoV-2) DULCE MARIA/PATRIC/JACQUIE/PAD/COR/MAD/SHARIF In-House, NP Swab in [facility], 3-4 HR TAT - Swab, Nasopharynx [[id number]]  (Normal) Collected: 11/15/20 0410    Specimen: Swab from Nasopharynx Updated: 11/15/20 0523     ADENOVIRUS, PCR Not Detected     Coronavirus 229E Not Detected     Coronavirus HKU1 Not Detected     Coronavirus NL63 Not Detected     Coronavirus OC43 Not Detected     COVID19 Not Detected     Human Metapneumovirus Not Detected     Human Rhinovirus/Enterovirus Not Detected     Influenza A PCR Not Detected     Influenza B PCR Not Detected     Parainfluenza Virus 1 Not Detected     Parainfluenza Virus 2 Not Detected     Parainfluenza Virus 3 Not Detected     Parainfluenza Virus 4 Not Detected     RSV, PCR Not Detected     Bordetella pertussis pcr Not Detected     Bordetella parapertussis PCR Not Detected     Chlamydophila pneumoniae PCR Not Detected     Mycoplasma pneumo by PCR Not Detected    Narrative:      Fact sheet for providers:  https://docs.[employer]/wp-content/uploads/FSM0369-5465-NG5.1-EUA-Provider-Fact-Sheet-3.pdf    Fact sheet for patients: https://docs.[employer]/wp-content/uploads/UFN1445-1291-GC2.1-EUA-Patient-Fact-Sheet-1.pdf    Urinalysis, Microscopic Only - Urine, Clean Catch [[id number]] Collected: 11/15/20 0410    Specimen: Urine, Clean Catch Updated: 11/15/20 0436     RBC, UA 0-2 /HPF      WBC, UA 0-2 /HPF      Bacteria, UA None Seen /HPF      Squamous Epithelial Cells, UA 0-2 /HPF      Hyaline Casts, UA None Seen /LPF      Methodology Automated Microscopy          I ordered the above labs and reviewed the results    RADIOLOGY  CT Angiogram Chest   Final Result       1. Cardiomegaly and interlobular septal thickening, and scattered   groundglass infiltrates. Constellation of findings is favored to   represent congestive heart failure. There is reflux of contrast material   into the hepatic veins, which can be seen in the setting of right-sided   heart failure. Bilateral pleural effusions are noted as well.       Radiation dose reduction techniques were utilized, including automated   exposure control and exposure modulation based on body size.       This report was finalized on 11/15/2020 5:11 AM by Dr. Candie Chang M.D.          XR Chest 1 View   Final Result   Cardiomegaly with vascular congestion.       This report was finalized on 11/15/2020 4:39 AM by Dr. Candie Chang M.D.               I ordered the above noted radiological studies. Interpreted by radiologist. Discussed with radiologist. Reviewed by me in PACS.       PROCEDURES  Procedures  EKG    EKG time: 0413  Rhythm/Rate: NSR, rate 99 BPM  Poor R wave progression  No Acute Ischemia  Non-Specific ST-T changes    No old EKG available for comparison    Interpreted Contemporaneously by me.  Independently viewed by me    PROGRESS AND CONSULTS  ED Course as of Nov 15 0733   Sun Nov 15, 2020   0545 BP(!): 148/112 [GP]      ED Course User Index  [GP]  Xander Wolfe MD      0409: Ordered Nitrostat and aspirin for chest tightness.    0434: Ordered CTA chest for further evaluation and management.     0518: Rechecked pt. Pt is resting comfortably. Notified pt of pt of lab work and imaging studies show CHF and bilateral pleural effusion. Discussed the plan to admit the pt for further evaluation and management. Pt and family agree with the plan and all questions were addressed.    0544: Discussed pt case with Sarah Wei ([facility]) who will admit the pt for Dr. Castellanos for further evaluation.    MEDICAL DECISION MAKING  Results were reviewed/discussed with the patient and they were also made aware of online access. Pt also made aware that some labs, such as cultures, will not be resulted during ER visit and follow up with PMD is necessary.     MDM  Number of Diagnoses or Management Options     Amount and/or Complexity of Data Reviewed  Clinical lab tests: ordered and reviewed (Troponin <0.010, BNP 2759, Magnesium 1.9, Creatinine 1.03, D-Dimer 1.63, PT-INR 15.3-1.23, WBC 8.57)  Tests in the radiology section of CPT®: ordered and reviewed (CXR show cardiomegaly with vascular congestion. CTA chest show cardiomegaly, congestive heart failure, right-sided heart failure, and bilateral pleural effusions.)  Tests in the medicine section of CPT®: reviewed and ordered (See EKG procedure note)  Decide to obtain previous medical records or to obtain history from someone other than the patient: yes  Review and summarize past medical records: yes (Pt has PMHx of CHF and HTN. Pt was [facility] at [facility] where he was noted to have CHF, cardiomegaly, elevated troponin levels, he reported that he does not have any CAD and he eloped from the ER.)  Independent visualization of images, tracings, or specimens: yes           DIAGNOSIS  Final diagnoses:   Acute congestive heart failure, unspecified heart failure type (CMS/HCC)   Bilateral pleural effusion   Shortness of breath    Cardiomegaly       DISPOSITION  ADMISSION    Discussed treatment plan and reason for admission with pt/family and admitting physician.  Pt/family voiced understanding of the plan for admission for further testing/treatment as needed.         Latest Documented Vital Signs:  As of 07:33 EST  BP- 136/97 HR- 94 Temp- 98 °F (36.7 °C) (Infrared) O2 sat- 99%    --  Documentation assistance provided by sinan Sommer for Dr. Isac Wolfe MD.  Information recorded by the scribe was done at my direction and has been verified and validated by me.                      Gloria Sommer  11/15/20 0546       Xander Wolfe MD  11/15/20 1058

## 2020-11-15 NOTE — H&P
"    Patient Name:  Colten Hager  YOB: 1970  MRN:  [id number]  Admit Date:  11/15/2020  Patient Care Team:  Provider, No Known as PCP - General      Subjective   History Present Illness     Chief Complaint   Patient presents with   • Shortness of Breath       Mr. Hager is a 49 y.o. smoker with a history of HTN, HLD, GERD, IVDA (methamphetamine) reported last use approximately 4 days ago who presented to [facility] ER with chief complaint of shortness of breath and admitted for acute systolic CHF.    Wife at bedside.  Patient answering all questions appropriately.  Reports last use of methamphetamine approximately 4 days ago via IVDA.  Patient with complaints of intermittent diarrhea, peripheral cramps, & SOB while sleeping creating abrupt awakening without associated fever, chills, cough, chest pain, nausea, vomiting, abdominal pain, or urinary complaints went to [facility] ER for further evaluation.      In ER, AVSS on RA with exception of HTN--/108 with subsequent improvement s/p ASA, nitropaste, & IV Lasix to relieve noted bilateral pleural effusions on CTA chest.  Laboratory findings unremarkable with exception of noted elevated d-dimer & proBNP >2000 yet CTA chest negative PE.  Troponin x2 unremarkable & unchanged.      Further recommendations per hospital course.  See additional details below in A/P.      History of Present Illness  Review of Systems   Constitutional: Negative for chills and fever.   HENT: Negative for congestion and rhinorrhea.    Respiratory: Negative for cough and shortness of breath.    Cardiovascular: Negative for chest pain and leg swelling.   Gastrointestinal: Positive for diarrhea (intermittent, non-bloody). Negative for abdominal pain, constipation, nausea and vomiting.   Endocrine: Negative for polydipsia, polyphagia and polyuria.   Genitourinary: Negative for difficulty urinating and dysuria.   Musculoskeletal: Positive for myalgias (\"muscle cramps\" of BLE & LLE). " Negative for back pain.   Skin: Negative for rash and wound.   Neurological: Negative for dizziness, tremors, facial asymmetry, weakness, light-headedness, numbness and headaches.   Psychiatric/Behavioral: Negative for confusion and hallucinations.        Personal History     Past Medical History:   Diagnosis Date   • Cellulitis    • CHF (congestive heart failure) (CMS/HCC)    • GERD (gastroesophageal reflux disease)    • Hyperlipidemia    • Hypertension      History reviewed. No pertinent surgical history.  History reviewed. No pertinent family history.  Social History     Tobacco Use   • Smoking status: Current Every Day Smoker     Packs/day: 1.00   • Smokeless tobacco: Never Used   Substance Use Topics   • Alcohol use: No   • Drug use: Yes     Types: Methamphetamines     Comment: last meth use 11/12/2020     No current facility-administered medications on file prior to encounter.      Current Outpatient Medications on File Prior to Encounter   Medication Sig Dispense Refill   • polyethylene glycol (MIRALAX) 17 GM/SCOOP powder Take 17 g by mouth Daily. 500 g 0   • sucralfate (CARAFATE) 1 g tablet Take 1 tablet by mouth 4 (Four) Times a Day. 16 tablet 0     No Known Allergies    Objective    Objective     Vital Signs  Temp:  [97.4 °F (36.3 °C)-98 °F (36.7 °C)] 98 °F (36.7 °C)  Heart Rate:  [93-99] 94  Resp:  [18-24] 18  BP: (134-161)/() 134/64  SpO2:  [96 %-100 %] 99 %  on   ;      Body mass index is 29.53 kg/m².    Physical Exam  Constitutional:       Appearance: Normal appearance.   HENT:      Head: Normocephalic and atraumatic.   Eyes:      Extraocular Movements: Extraocular movements intact.      Conjunctiva/sclera: Conjunctivae normal.   Neck:      Musculoskeletal: Normal range of motion and neck supple.   Cardiovascular:      Rate and Rhythm: Normal rate.      Heart sounds: Normal heart sounds.   Pulmonary:      Effort: No respiratory distress.      Breath sounds: Normal breath sounds.   Abdominal:       "General: Bowel sounds are normal.      Palpations: Abdomen is soft.   Musculoskeletal:         General: Tenderness (subjective associated with \"muscle cramps\" of BLE & LUE) present.      Right lower leg: No edema.      Left lower leg: No edema.   Skin:     General: Skin is warm and dry.   Neurological:      Mental Status: He is alert and oriented to person, place, and time.      Cranial Nerves: No cranial nerve deficit.   Psychiatric:         Behavior: Behavior normal.         Thought Content: Thought content normal.         Results Review:  I reviewed the patient's new clinical results.  I reviewed the patient's new imaging results and agree with the interpretation.  I reviewed the patient's other test results and agree with the interpretation  I personally viewed and interpreted the patient's EKG/Telemetry data  Discussed with ED provider.    Lab Results (last 24 hours)     Procedure Component Value Units Date/Time    CBC & Differential [[id number]]  (Abnormal) Collected: 11/15/20 0357    Specimen: Blood Updated: 11/15/20 0414    Narrative:      The following orders were created for panel order CBC & Differential.  Procedure                               Abnormality         Status                     ---------                               -----------         ------                     CBC Auto Differential[[id number]]        Abnormal            Final result                 Please view results for these tests on the individual orders.    Comprehensive Metabolic Panel [[id number]]  (Abnormal) Collected: 11/15/20 0357    Specimen: Blood Updated: 11/15/20 0438     Glucose 104 mg/dL      BUN 16 mg/dL      Creatinine 1.03 mg/dL      Sodium 139 mmol/L      Potassium 4.1 mmol/L      Chloride 103 mmol/L      CO2 25.4 mmol/L      Calcium 8.8 mg/dL      Total Protein 6.3 g/dL      Albumin 3.80 g/dL      ALT (SGPT) 19 U/L      AST (SGOT) 20 U/L      Alkaline Phosphatase 82 U/L      Total Bilirubin 0.3 mg/dL      eGFR Non "  Amer 76 mL/min/1.73      Globulin 2.5 gm/dL      A/G Ratio 1.5 g/dL      BUN/Creatinine Ratio 15.5     Anion Gap 10.6 mmol/L     Narrative:      GFR Normal >60  Chronic Kidney Disease <60  Kidney Failure <15      Protime-INR [[id number]]  (Abnormal) Collected: 11/15/20 0357    Specimen: Blood Updated: 11/15/20 0430     Protime 15.3 Seconds      INR 1.23    BNP [[id number]]  (Abnormal) Collected: 11/15/20 0357    Specimen: Blood Updated: 11/15/20 0439     proBNP 2,759.0 pg/mL     Narrative:      Among patients with dyspnea, NT-proBNP is highly sensitive for the detection of acute congestive heart failure. In addition NT-proBNP of <300 pg/ml effectively rules out acute congestive heart failure with 99% negative predictive value.    Results may be falsely decreased if patient taking Biotin.      Troponin [[id number]]  (Normal) Collected: 11/15/20 0357    Specimen: Blood Updated: 11/15/20 0439     Troponin T <0.010 ng/mL     Narrative:      Troponin T Reference Range:  <= 0.03 ng/mL-   Negative for AMI  >0.03 ng/mL-     Abnormal for myocardial necrosis.  Clinicians would have to utilize clinical acumen, EKG, Troponin and serial changes to determine if it is an Acute Myocardial Infarction or myocardial injury due to an underlying chronic condition.       Results may be falsely decreased if patient taking Biotin.      Ethanol [[id number]] Collected: 11/15/20 0357    Specimen: Blood Updated: 11/15/20 0438     Ethanol <10 mg/dL      Ethanol % <0.010 %     Magnesium [[id number]]  (Normal) Collected: 11/15/20 0357    Specimen: Blood Updated: 11/15/20 0438     Magnesium 1.9 mg/dL     TSH [[id number]]  (Normal) Collected: 11/15/20 0357    Specimen: Blood Updated: 11/15/20 0527     TSH 2.140 uIU/mL     T4, Free [[id number]]  (Normal) Collected: 11/15/20 0357    Specimen: Blood Updated: 11/15/20 0527     Free T4 1.22 ng/dL     Narrative:      Results may be falsely increased if patient taking Biotin.      CBC Auto Differential  [[id number]]  (Abnormal) Collected: 11/15/20 0357    Specimen: Blood Updated: 11/15/20 0414     WBC 8.57 10*3/mm3      RBC 3.97 10*6/mm3      Hemoglobin 11.9 g/dL      Hematocrit 35.5 %      MCV 89.4 fL      MCH 30.0 pg      MCHC 33.5 g/dL      RDW 12.3 %      RDW-SD 40.1 fl      MPV 11.1 fL      Platelets 151 10*3/mm3      Neutrophil % 61.1 %      Lymphocyte % 25.0 %      Monocyte % 11.2 %      Eosinophil % 1.3 %      Basophil % 0.9 %      Immature Grans % 0.5 %      Neutrophils, Absolute 5.24 10*3/mm3      Lymphocytes, Absolute 2.14 10*3/mm3      Monocytes, Absolute 0.96 10*3/mm3      Eosinophils, Absolute 0.11 10*3/mm3      Basophils, Absolute 0.08 10*3/mm3      Immature Grans, Absolute 0.04 10*3/mm3      nRBC 0.0 /100 WBC     D-dimer, Quantitative [[id number]]  (Abnormal) Collected: 11/15/20 0357    Specimen: Blood Updated: 11/15/20 0430     D-Dimer, Quantitative 1.63 MCGFEU/mL     Narrative:      The Stago D-Dimer test used in conjunction with a clinical pretest probability (PTP) assessment model, has been approved by the FDA to rule out the presence of venous thromboembolism (VTE) in outpatients suspected of deep venous thrombosis (DVT) or pulmonary embolism (PE). The cut-off for negative predictive value is <0.50 MCGFEU/mL.    Urinalysis With Microscopic If Indicated (No Culture) - Urine, Clean Catch [[id number]]  (Abnormal) Collected: 11/15/20 0410    Specimen: Urine, Clean Catch Updated: 11/15/20 0436     Color, UA Yellow     Appearance, UA Clear     pH, UA <=5.0     Specific Gravity, UA 1.019     Glucose, UA Negative     Ketones, UA Negative     Bilirubin, UA Negative     Blood, UA Negative     Protein, UA 30 mg/dL (1+)     Leuk Esterase, UA Negative     Nitrite, UA Negative     Urobilinogen, UA 0.2 E.U./dL    COVID PRE-OP / PRE-PROCEDURE SCREENING ORDER (NO ISOLATION) - Swab, Nasopharynx [[id number]]  (Normal) Collected: 11/15/20 0410    Specimen: Swab from Nasopharynx Updated: 11/15/20 0523    Narrative:       The following orders were created for panel order COVID PRE-OP / PRE-PROCEDURE SCREENING ORDER (NO ISOLATION) - Swab, Nasopharynx.  Procedure                               Abnormality         Status                     ---------                               -----------         ------                     Respiratory Panel PCR w/...[[id number]]  Normal              Final result                 Please view results for these tests on the individual orders.    Urine Drug Screen - Urine, Clean Catch [[id number]]  (Abnormal) Collected: 11/15/20 0410    Specimen: Urine, Clean Catch Updated: 11/15/20 0509     Amphet/Methamphet, Screen Positive     Barbiturates Screen, Urine Negative     Benzodiazepine Screen, Urine Negative     Cocaine Screen, Urine Negative     Opiate Screen Negative     THC, Screen, Urine Negative     Methadone Screen, Urine Negative     Oxycodone Screen, Urine Negative    Narrative:      Negative Thresholds For Drugs Screened:     Amphetamines               500 ng/ml   Barbiturates               200 ng/ml   Benzodiazepines            100 ng/ml   Cocaine                    300 ng/ml   Methadone                  300 ng/ml   Opiates                    300 ng/ml   Oxycodone                  100 ng/ml   THC                        50 ng/ml    The Normal Value for all drugs tested is negative. This report includes final unconfirmed screening results to be used for medical treatment purposes only. Unconfirmed results must not be used for non-medical purposes such as employment or legal testing. Clinical consideration should be applied to any drug of abuse test, particulary when unconfirmed results are used.    Respiratory Panel PCR w/COVID-19(SARS-CoV-2) DULCE MARIA/PATRIC/JACQUIE/PAD/COR/MAD/SHARIF In-House, NP Swab in [facility], 3-4 HR TAT - Swab, Nasopharynx [[id number]]  (Normal) Collected: 11/15/20 0410    Specimen: Swab from Nasopharynx Updated: 11/15/20 0543     ADENOVIRUS, PCR Not Detected     Coronavirus 229E Not Detected      Coronavirus HKU1 Not Detected     Coronavirus NL63 Not Detected     Coronavirus OC43 Not Detected     COVID19 Not Detected     Human Metapneumovirus Not Detected     Human Rhinovirus/Enterovirus Not Detected     Influenza A PCR Not Detected     Influenza B PCR Not Detected     Parainfluenza Virus 1 Not Detected     Parainfluenza Virus 2 Not Detected     Parainfluenza Virus 3 Not Detected     Parainfluenza Virus 4 Not Detected     RSV, PCR Not Detected     Bordetella pertussis pcr Not Detected     Bordetella parapertussis PCR Not Detected     Chlamydophila pneumoniae PCR Not Detected     Mycoplasma pneumo by PCR Not Detected    Narrative:      Fact sheet for providers: https://docs.[employer]/wp-content/uploads/GND8080-4989-KZ8.1-EUA-Provider-Fact-Sheet-3.pdf    Fact sheet for patients: https://[employer]/wp-content/uploads/MAZ5975-6152-QN3.1-EUA-Patient-Fact-Sheet-1.pdf    Urinalysis, Microscopic Only - Urine, Clean Catch [[id number]] Collected: 11/15/20 0410    Specimen: Urine, Clean Catch Updated: 11/15/20 0436     RBC, UA 0-2 /HPF      WBC, UA 0-2 /HPF      Bacteria, UA None Seen /HPF      Squamous Epithelial Cells, UA 0-2 /HPF      Hyaline Casts, UA None Seen /LPF      Methodology Automated Microscopy    CBC Auto Differential [[id number]]  (Abnormal) Collected: 11/15/20 0841    Specimen: Blood Updated: 11/15/20 0921     WBC 9.40 10*3/mm3      RBC 4.00 10*6/mm3      Hemoglobin 11.9 g/dL      Hematocrit 36.5 %      MCV 91.3 fL      MCH 29.8 pg      MCHC 32.6 g/dL      RDW 12.4 %      RDW-SD 40.4 fl      MPV 10.8 fL      Platelets 168 10*3/mm3      Neutrophil % 65.2 %      Lymphocyte % 21.3 %      Monocyte % 11.0 %      Eosinophil % 1.3 %      Basophil % 0.9 %      Immature Grans % 0.3 %      Neutrophils, Absolute 6.14 10*3/mm3      Lymphocytes, Absolute 2.00 10*3/mm3      Monocytes, Absolute 1.03 10*3/mm3      Eosinophils, Absolute 0.12 10*3/mm3      Basophils, Absolute 0.08 10*3/mm3      Immature  Grans, Absolute 0.03 10*3/mm3      nRBC 0.0 /100 WBC     Comprehensive Metabolic Panel [[id number]]  (Abnormal) Collected: 11/15/20 0841    Specimen: Blood Updated: 11/15/20 1015     Glucose 91 mg/dL      BUN 16 mg/dL      Creatinine 1.20 mg/dL      Sodium 142 mmol/L      Potassium 4.0 mmol/L      Chloride 100 mmol/L      CO2 29.8 mmol/L      Calcium 8.9 mg/dL      Total Protein 6.3 g/dL      Albumin 3.70 g/dL      ALT (SGPT) 17 U/L      AST (SGOT) 20 U/L      Alkaline Phosphatase 85 U/L      Total Bilirubin 0.5 mg/dL      eGFR Non African Amer 64 mL/min/1.73      Globulin 2.6 gm/dL      A/G Ratio 1.4 g/dL      BUN/Creatinine Ratio 13.3     Anion Gap 12.2 mmol/L     Narrative:      GFR Normal >60  Chronic Kidney Disease <60  Kidney Failure <15      TSH [[id number]]  (Normal) Collected: 11/15/20 0841    Specimen: Blood Updated: 11/15/20 1021     TSH 2.090 uIU/mL     Troponin [[id number]]  (Normal) Collected: 11/15/20 0841    Specimen: Blood Updated: 11/15/20 1021     Troponin T <0.010 ng/mL     Narrative:      Troponin T Reference Range:  <= 0.03 ng/mL-   Negative for AMI  >0.03 ng/mL-     Abnormal for myocardial necrosis.  Clinicians would have to utilize clinical acumen, EKG, Troponin and serial changes to determine if it is an Acute Myocardial Infarction or myocardial injury due to an underlying chronic condition.       Results may be falsely decreased if patient taking Biotin.      Magnesium [[id number]]  (Normal) Collected: 11/15/20 0841    Specimen: Blood Updated: 11/15/20 1015     Magnesium 2.0 mg/dL     Phosphorus [[id number]]  (Normal) Collected: 11/15/20 0841    Specimen: Blood Updated: 11/15/20 1015     Phosphorus 4.3 mg/dL           Imaging Results (Last 24 Hours)     Procedure Component Value Units Date/Time    CT Angiogram Chest [[id number]] Collected: 11/15/20 0506     Updated: 11/15/20 0514    Narrative:      CT ANGIOGRAM OF THE CHEST     HISTORY: Shortness of air     COMPARISON: None available.      TECHNIQUE: Axial CT imaging was obtained from the thoracic inlet through  the dome the diaphragm. IV contrast was administered. Three-D  reformatted images were obtained.     FINDINGS:  No acute pulmonary thromboembolus is identified. Examination was not  optimized for evaluation of the thoracic aorta. It measures within  normal size limits. There is reflux of contrast material into the  hepatic veins. Finding can be associated with right-sided heart failure.  The heart is enlarged. There is interlobular septal thickening, with  scattered groundglass infiltrates, favored to represent edema. There are  bilateral pleural effusions, right greater than left. The thyroid gland,  trachea, and esophagus are unremarkable. There is trace pericardial  fluid. Mediastinal lymph nodes do not appear pathologically enlarged.  There is mild atelectasis. There is cholelithiasis. No acute osseous  abnormalities are seen.       Impression:         1. Cardiomegaly and interlobular septal thickening, and scattered  groundglass infiltrates. Constellation of findings is favored to  represent congestive heart failure. There is reflux of contrast material  into the hepatic veins, which can be seen in the setting of right-sided  heart failure. Bilateral pleural effusions are noted as well.     Radiation dose reduction techniques were utilized, including automated  exposure control and exposure modulation based on body size.     This report was finalized on 11/15/2020 5:11 AM by Dr. Candie Chang M.D.       XR Chest 1 View [[id number]] Collected: 11/15/20 0438     Updated: 11/15/20 0442    Narrative:      SINGLE VIEW CHEST     HISTORY: Shortness of air     COMPARISON: None available.     FINDINGS:  Cardiomegaly is present. There is vascular congestion. No pneumothorax  is seen. There is a small left pleural effusion and possible right  pleural effusion as well. No pneumothorax is identified.       Impression:      Cardiomegaly with  vascular congestion.     This report was finalized on 11/15/2020 4:39 AM by Dr. Candie Chang M.D.             Results for orders placed during the hospital encounter of 11/15/20   Transthoracic Echo Complete With Contrast if Necessary Per Protocol    Narrative · Calculated left ventricular EF = 25% Estimated left ventricular EF was   in agreement with the calculated left ventricular EF. Left ventricular   systolic function is severely decreased.  · The left ventricular cavity is moderate to severely dilated.  · The following left ventricular wall segments are hypokinetic: mid   anterior, apical anterior, basal anterolateral, mid anterolateral, apical   lateral, basal inferolateral, mid inferolateral, apical inferior, mid   inferior, apical septal, basal inferoseptal, mid inferoseptal, apex   hypokinetic, mid anteroseptal, basal anterior, basal inferior and basal   inferoseptal.  · Left ventricular diastolic function is consistent with (grade III w/high   LAP) reversible restrictive pattern.  · The right atrial cavity is moderately dilated.  · Left atrial volume is moderately increased.  · Mild aortic valve regurgitation is present.  · Mild aortic valve regurgitation is present.  · Mild to moderate tricuspid valve regurgitation is present.  · Estimated right ventricular systolic pressure from tricuspid   regurgitation is mildly elevated (35-45 mmHg).          ECG 12 Lead   Final Result   HEART RATE= 99  bpm   RR Interval= 608  ms   AZ Interval= 153  ms   P Horizontal Axis= 2  deg   P Front Axis= 53  deg   QRSD Interval= 84  ms   QT Interval= 360  ms   QRS Axis= 94  deg   T Wave Axis= 45  deg   - ABNORMAL ECG -   Sinus rhythm   Anterior infarct, old   NO PRIOR TRACING AVAILABLE FOR COMPARISON   Electronically Signed By: Michael WardSoutheastern [facility] Services) 15-Nov-2020 10:44:17   Date and Time of Study: 2020-11-15 04:13:54           Assessment/Plan     Active Hospital Problems    Diagnosis  POA   • **Systolic CHF, acute  (CMS/HCC) [I50.21]  Yes   • Essential hypertension [I10]  Yes   • Substance abuse (CMS/HCC) [F19.10]  Yes   • Dyslipidemia [E78.5]  Yes   • GERD without esophagitis [K21.9]  Yes   • Methamphetamine abuse (CMS/HCC) [F15.10]  Yes   • IV drug abuse (CMS/HCC) [F19.10]  Yes   • Tobacco abuse [Z72.0]  Yes      Resolved Hospital Problems   No resolved problems to display.       Mr. Hager is a 49 y.o. smoker with a history of HTN, HLD, GERD, IVDA (methamphetamine) reported last use approximately 4 days ago who presented to [facility] ER with chief complaint of shortness of breath and admitted for acute systolic CHF.      Systolic CHF, acute (CMS/HCC).  Cardiology following--input appreciated.  Plan cardiac cath tomorrow on 11/16/2020 to evaluate for ischemic etiology.  Continue BB.  Hold additional diuretics for now.     Essential hypertension.  BP stable.  Possible Losartan pending cath--renal fxn noted.     Dyslipidemia.  LFTs unremarkable.     GERD without esophagitis.  Stable appearance.     Methamphetamine abuse (CMS/HCC) / IV drug abuse (CMS/HCC).  Ordering acute hepatitis panel.  No signs of SIRS / sepsis at present.  Echo LV EF 25% with diastolic dysfunction & reversible restrictive pattern.  RV SBP mild elevated 35-45 mmHg.  No mention of valvular vegetation on echo.  Ordering acute hepatitis panel & HIV 1-2.      Tobacco abuse.  Recommend smoking cessation per hospital protocol.      · I discussed the patient's findings and my recommendations with Dr. Davis.    VTE Prophylaxis - SCD  Code Status - CPR       DIANA Finney  [facility]  11/15/20  13:51 EST

## 2020-11-15 NOTE — ED NOTES
Nursing report ED to floor  Colten Hager  49 y.o.  male    HPI (triage note):   Chief Complaint   Patient presents with   • Shortness of Breath       Admitting doctor:   James Castellanos MD    Admitting diagnosis:   The primary encounter diagnosis was Acute congestive heart failure, unspecified heart failure type (CMS/HCC). Diagnoses of Bilateral pleural effusion, Shortness of breath, and Cardiomegaly were also pertinent to this visit.    Code status:   Current Code Status     Date Active Code Status Order ID Comments User Context       11/15/2020 0548 CPR [id number]  Sarah Wei, APRN ED     Advance Care Planning Activity      Questions for Current Code Status     Question Answer Comment    Code Status CPR     Medical Interventions (Level of Support Prior to Arrest) Full           Allergies:   Patient has no known allergies.    Weight:       11/15/20  0351   Weight: 95.3 kg (210 lb)       Most recent vitals:   Vitals:    11/15/20 0440 11/15/20 0500 11/15/20 0509 11/15/20 0530   BP: (!) 153/119 (!) 154/109  (!) 148/112   Pulse: 95  93 95   Resp:       Temp:       SpO2:   96% 98%   Weight:       Height:           Active LDAs/IV Access:   Lines, Drains & Airways    Active LDAs     Name:   Placement date:   Placement time:   Site:   Days:    Peripheral IV 11/15/20 0403 Left Forearm   11/15/20    0403    Forearm   less than 1                Labs (abnormal labs have a star):   Labs Reviewed   URINALYSIS W/ MICROSCOPIC IF INDICATED (NO CULTURE) - Abnormal; Notable for the following components:       Result Value    Protein, UA 30 mg/dL (1+) (*)     All other components within normal limits   COMPREHENSIVE METABOLIC PANEL - Abnormal; Notable for the following components:    Glucose 104 (*)     All other components within normal limits    Narrative:     GFR Normal >60  Chronic Kidney Disease <60  Kidney Failure <15     PROTIME-INR - Abnormal; Notable for the following components:    Protime 15.3 (*)     INR 1.23  (*)     All other components within normal limits   BNP (IN-HOUSE) - Abnormal; Notable for the following components:    proBNP 2,759.0 (*)     All other components within normal limits    Narrative:     Among patients with dyspnea, NT-proBNP is highly sensitive for the detection of acute congestive heart failure. In addition NT-proBNP of <300 pg/ml effectively rules out acute congestive heart failure with 99% negative predictive value.    Results may be falsely decreased if patient taking Biotin.     URINE DRUG SCREEN - Abnormal; Notable for the following components:    Amphet/Methamphet, Screen Positive (*)     All other components within normal limits    Narrative:     Negative Thresholds For Drugs Screened:     Amphetamines               500 ng/ml   Barbiturates               200 ng/ml   Benzodiazepines            100 ng/ml   Cocaine                    300 ng/ml   Methadone                  300 ng/ml   Opiates                    300 ng/ml   Oxycodone                  100 ng/ml   THC                        50 ng/ml    The Normal Value for all drugs tested is negative. This report includes final unconfirmed screening results to be used for medical treatment purposes only. Unconfirmed results must not be used for non-medical purposes such as employment or legal testing. Clinical consideration should be applied to any drug of abuse test, particulary when unconfirmed results are used.   CBC WITH AUTO DIFFERENTIAL - Abnormal; Notable for the following components:    RBC 3.97 (*)     Hemoglobin 11.9 (*)     Hematocrit 35.5 (*)     Monocytes, Absolute 0.96 (*)     All other components within normal limits   D-DIMER, QUANTITATIVE - Abnormal; Notable for the following components:    D-Dimer, Quantitative 1.63 (*)     All other components within normal limits    Narrative:     The [place] D-Dimer test used in conjunction with a clinical pretest probability (PTP) assessment model, has been approved by the FDA to rule out the  presence of venous thromboembolism (VTE) in outpatients suspected of deep venous thrombosis (DVT) or pulmonary embolism (PE). The cut-off for negative predictive value is <0.50 MCGFEU/mL.   RESPIRATORY PANEL PCR W/ COVID-19 (SARS-COV-2) DULCE MARIA/PATRIC/JACQUIE/PAD/COR/MAD/SHARIF IN-HOUSE, NP SWAB IN UTM/VTP, 3-4 HR TAT - Normal    Narrative:     Fact sheet for providers: https://docs.[employer]/wp-content/uploads/QUA2559-0335-ZO1.1-EUA-Provider-Fact-Sheet-3.pdf    Fact sheet for patients: https://docs.[employer]/wp-content/uploads/OMA6838-4196-BO3.1-EUA-Patient-Fact-Sheet-1.pdf   TROPONIN (IN-HOUSE) - Normal    Narrative:     Troponin T Reference Range:  <= 0.03 ng/mL-   Negative for AMI  >0.03 ng/mL-     Abnormal for myocardial necrosis.  Clinicians would have to utilize clinical acumen, EKG, Troponin and serial changes to determine if it is an Acute Myocardial Infarction or myocardial injury due to an underlying chronic condition.       Results may be falsely decreased if patient taking Biotin.     MAGNESIUM - Normal   TSH - Normal   T4, FREE - Normal    Narrative:     Results may be falsely increased if patient taking Biotin.     COVID PRE-OP / PRE-PROCEDURE SCREENING ORDER (NO ISOLATION)    Narrative:     The following orders were created for panel order COVID PRE-OP / PRE-PROCEDURE SCREENING ORDER (NO ISOLATION) - Swab, Nasopharynx.  Procedure                               Abnormality         Status                     ---------                               -----------         ------                     Respiratory Panel PCR w/...[[id number]]  Normal              Final result                 Please view results for these tests on the individual orders.   RAINBOW DRAW    Narrative:     The following orders were created for panel order [place] Draw.  Procedure                               Abnormality         Status                     ---------                               -----------         ------                      Light Blue Top[[id number]]                                   Final result               Green Top (Gel)[[id number]]                                  Final result               Lavender Top[[id number]]                                     Final result               Gold Top - SST[[id number]]                                   Final result                 Please view results for these tests on the individual orders.   ETHANOL   URINALYSIS, MICROSCOPIC ONLY   CBC WITH AUTO DIFFERENTIAL   COMPREHENSIVE METABOLIC PANEL   TSH   TROPONIN (IN-HOUSE)   MAGNESIUM   PHOSPHORUS   LIGHT BLUE TOP   GREEN TOP   LAVENDER TOP   GOLD TOP - SST   CBC AND DIFFERENTIAL    Narrative:     The following orders were created for panel order CBC & Differential.  Procedure                               Abnormality         Status                     ---------                               -----------         ------                     CBC Auto Differential[[id number]]        Abnormal            Final result                 Please view results for these tests on the individual orders.       EKG:   ECG 12 Lead   Preliminary Result   HEART RATE= 99  bpm   RR Interval= 608  ms   IA Interval= 153  ms   P Horizontal Axis= 2  deg   P Front Axis= 53  deg   QRSD Interval= 84  ms   QT Interval= 360  ms   QRS Axis= 94  deg   T Wave Axis= 45  deg   - ABNORMAL ECG -   Sinus rhythm   Anterior infarct, old   Electronically Signed By:    Date and Time of Study: 2020-11-15 04:13:54          Meds given in ED:   Medications   sodium chloride 0.9 % flush 10 mL (has no administration in time range)   sodium chloride 0.9 % flush 10 mL (has no administration in time range)   sodium chloride 0.9 % flush 10 mL (has no administration in time range)   nitroglycerin (NITROSTAT) SL tablet 0.4 mg (has no administration in time range)   acetaminophen (TYLENOL) tablet 650 mg (has no administration in time range)     Or   acetaminophen (TYLENOL) 160 MG/5ML solution 650 mg (has no  administration in time range)     Or   acetaminophen (TYLENOL) suppository 650 mg (has no administration in time range)   ondansetron (ZOFRAN) injection 4 mg (has no administration in time range)   aspirin tablet 325 mg (325 mg Oral Given 11/15/20 0440)   nitroglycerin (NITROSTAT) ointment 1 inch (1 inch Topical Given 11/15/20 0440)   iopamidol (ISOVUE-370) 76 % injection 100 mL (100 mL Intravenous Given by Other 11/15/20 2477)   furosemide (LASIX) injection 40 mg (40 mg Intravenous Given 11/15/20 0539)       Imaging results:  Xr Chest 1 View    Result Date: 11/15/2020  Cardiomegaly with vascular congestion.  This report was finalized on 11/15/2020 4:39 AM by Dr. Candie Chang M.D.      Ct Angiogram Chest    Result Date: 11/15/2020   1. Cardiomegaly and interlobular septal thickening, and scattered groundglass infiltrates. Constellation of findings is favored to represent congestive heart failure. There is reflux of contrast material into the hepatic veins, which can be seen in the setting of right-sided heart failure. Bilateral pleural effusions are noted as well.  Radiation dose reduction techniques were utilized, including automated exposure control and exposure modulation based on body size.  This report was finalized on 11/15/2020 5:11 AM by Dr. Candie Chang M.D.        Ambulatory status:   - with assist    Social issues:   Social History     Socioeconomic History   • Marital status:      Spouse name: Not on file   • Number of children: Not on file   • Years of education: Not on file   • Highest education level: Not on file   Tobacco Use   • Smoking status: Current Every Day Smoker     Packs/day: 1.00   • Smokeless tobacco: Never Used   Substance and Sexual Activity   • Alcohol use: No   • Drug use: Yes     Types: Methamphetamines     Comment: last meth use 11/12/2020   • Sexual activity: Defer    Nursing report ED to floor     Nikole Gupta RN  11/15/20 9415

## 2020-11-16 PROBLEM — I50.21 ACUTE SYSTOLIC CONGESTIVE HEART FAILURE (HCC): Status: ACTIVE | Noted: 2020-11-15

## 2020-11-16 LAB
ANION GAP SERPL CALCULATED.3IONS-SCNC: 11.2 MMOL/L (ref 5–15)
BUN SERPL-MCNC: 15 MG/DL (ref 6–20)
BUN/CREAT SERPL: 14.4 (ref 7–25)
CALCIUM SPEC-SCNC: 8.9 MG/DL (ref 8.6–10.5)
CATH EF ESTIMATED: 10 %
CHLORIDE SERPL-SCNC: 100 MMOL/L (ref 98–107)
CHOLEST SERPL-MCNC: 119 MG/DL (ref 0–200)
CO2 SERPL-SCNC: 25.8 MMOL/L (ref 22–29)
CREAT SERPL-MCNC: 1.04 MG/DL (ref 0.76–1.27)
DEPRECATED RDW RBC AUTO: 40.2 FL (ref 37–54)
ERYTHROCYTE [DISTWIDTH] IN BLOOD BY AUTOMATED COUNT: 12.3 % (ref 12.3–15.4)
GFR SERPL CREATININE-BSD FRML MDRD: 76 ML/MIN/1.73
GLUCOSE SERPL-MCNC: 107 MG/DL (ref 65–99)
HCT VFR BLD AUTO: 39.1 % (ref 37.5–51)
HDLC SERPL-MCNC: 32 MG/DL (ref 40–60)
HGB BLD-MCNC: 13.1 G/DL (ref 13–17.7)
LDLC SERPL CALC-MCNC: 74 MG/DL (ref 0–100)
LDLC/HDLC SERPL: 2.34 {RATIO}
MCH RBC QN AUTO: 30.1 PG (ref 26.6–33)
MCHC RBC AUTO-ENTMCNC: 33.5 G/DL (ref 31.5–35.7)
MCV RBC AUTO: 89.9 FL (ref 79–97)
PLATELET # BLD AUTO: 182 10*3/MM3 (ref 140–450)
PMV BLD AUTO: 11 FL (ref 6–12)
POTASSIUM SERPL-SCNC: 4.3 MMOL/L (ref 3.5–5.2)
RBC # BLD AUTO: 4.35 10*6/MM3 (ref 4.14–5.8)
SODIUM SERPL-SCNC: 137 MMOL/L (ref 136–145)
TRIGL SERPL-MCNC: 60 MG/DL (ref 0–150)
VLDLC SERPL-MCNC: 13 MG/DL (ref 5–40)
WBC # BLD AUTO: 9.11 10*3/MM3 (ref 3.4–10.8)

## 2020-11-16 PROCEDURE — 93458 L HRT ARTERY/VENTRICLE ANGIO: CPT | Performed by: INTERNAL MEDICINE

## 2020-11-16 PROCEDURE — 80061 LIPID PANEL: CPT | Performed by: INTERNAL MEDICINE

## 2020-11-16 PROCEDURE — 90791 PSYCH DIAGNOSTIC EVALUATION: CPT | Performed by: SOCIAL WORKER

## 2020-11-16 PROCEDURE — C1769 GUIDE WIRE: HCPCS | Performed by: INTERNAL MEDICINE

## 2020-11-16 PROCEDURE — 99232 SBSQ HOSP IP/OBS MODERATE 35: CPT | Performed by: INTERNAL MEDICINE

## 2020-11-16 PROCEDURE — 0 IOPAMIDOL PER 1 ML: Performed by: INTERNAL MEDICINE

## 2020-11-16 PROCEDURE — 80048 BASIC METABOLIC PNL TOTAL CA: CPT | Performed by: NURSE PRACTITIONER

## 2020-11-16 PROCEDURE — C1894 INTRO/SHEATH, NON-LASER: HCPCS | Performed by: INTERNAL MEDICINE

## 2020-11-16 PROCEDURE — 25010000002 MIDAZOLAM PER 1 MG: Performed by: INTERNAL MEDICINE

## 2020-11-16 PROCEDURE — B2111ZZ FLUOROSCOPY OF MULTIPLE CORONARY ARTERIES USING LOW OSMOLAR CONTRAST: ICD-10-PCS | Performed by: INTERNAL MEDICINE

## 2020-11-16 PROCEDURE — 25010000002 FENTANYL CITRATE (PF) 100 MCG/2ML SOLUTION: Performed by: INTERNAL MEDICINE

## 2020-11-16 PROCEDURE — 25010000003 LIDOCAINE 1 % SOLUTION: Performed by: INTERNAL MEDICINE

## 2020-11-16 PROCEDURE — 85027 COMPLETE CBC AUTOMATED: CPT | Performed by: NURSE PRACTITIONER

## 2020-11-16 PROCEDURE — 4A023N7 MEASUREMENT OF CARDIAC SAMPLING AND PRESSURE, LEFT HEART, PERCUTANEOUS APPROACH: ICD-10-PCS | Performed by: INTERNAL MEDICINE

## 2020-11-16 PROCEDURE — 25010000002 FUROSEMIDE PER 20 MG: Performed by: INTERNAL MEDICINE

## 2020-11-16 PROCEDURE — B2151ZZ FLUOROSCOPY OF LEFT HEART USING LOW OSMOLAR CONTRAST: ICD-10-PCS | Performed by: INTERNAL MEDICINE

## 2020-11-16 PROCEDURE — 25010000002 HEPARIN (PORCINE) PER 1000 UNITS: Performed by: INTERNAL MEDICINE

## 2020-11-16 RX ORDER — LACTULOSE 10 G/15ML
20 SOLUTION ORAL 4 TIMES DAILY PRN
Status: DISCONTINUED | OUTPATIENT
Start: 2020-11-16 | End: 2020-11-17 | Stop reason: HOSPADM

## 2020-11-16 RX ORDER — ACETAMINOPHEN 325 MG/1
650 TABLET ORAL EVERY 4 HOURS PRN
Status: DISCONTINUED | OUTPATIENT
Start: 2020-11-16 | End: 2020-11-17 | Stop reason: HOSPADM

## 2020-11-16 RX ORDER — LISINOPRIL 10 MG/1
10 TABLET ORAL
Status: DISCONTINUED | OUTPATIENT
Start: 2020-11-16 | End: 2020-11-17 | Stop reason: HOSPADM

## 2020-11-16 RX ORDER — DOCUSATE SODIUM 100 MG/1
100 CAPSULE, LIQUID FILLED ORAL 2 TIMES DAILY
Status: DISCONTINUED | OUTPATIENT
Start: 2020-11-16 | End: 2020-11-17 | Stop reason: HOSPADM

## 2020-11-16 RX ORDER — MIDAZOLAM HYDROCHLORIDE 1 MG/ML
INJECTION INTRAMUSCULAR; INTRAVENOUS AS NEEDED
Status: DISCONTINUED | OUTPATIENT
Start: 2020-11-16 | End: 2020-11-16 | Stop reason: HOSPADM

## 2020-11-16 RX ORDER — FENTANYL CITRATE 50 UG/ML
INJECTION, SOLUTION INTRAMUSCULAR; INTRAVENOUS AS NEEDED
Status: DISCONTINUED | OUTPATIENT
Start: 2020-11-16 | End: 2020-11-16 | Stop reason: HOSPADM

## 2020-11-16 RX ORDER — LIDOCAINE HYDROCHLORIDE 10 MG/ML
INJECTION, SOLUTION INFILTRATION; PERINEURAL AS NEEDED
Status: DISCONTINUED | OUTPATIENT
Start: 2020-11-16 | End: 2020-11-16 | Stop reason: HOSPADM

## 2020-11-16 RX ADMIN — POTASSIUM CHLORIDE 20 MEQ: 750 TABLET, EXTENDED RELEASE ORAL at 18:38

## 2020-11-16 RX ADMIN — GABAPENTIN 300 MG: 300 CAPSULE ORAL at 16:04

## 2020-11-16 RX ADMIN — FAMOTIDINE 20 MG: 10 INJECTION INTRAVENOUS at 21:09

## 2020-11-16 RX ADMIN — LACTULOSE 20 G: 20 SOLUTION ORAL at 23:32

## 2020-11-16 RX ADMIN — CARVEDILOL 3.12 MG: 3.12 TABLET, FILM COATED ORAL at 21:09

## 2020-11-16 RX ADMIN — DOCUSATE SODIUM 100 MG: 100 CAPSULE, LIQUID FILLED ORAL at 21:09

## 2020-11-16 RX ADMIN — CARVEDILOL 3.12 MG: 3.12 TABLET, FILM COATED ORAL at 10:53

## 2020-11-16 RX ADMIN — NICOTINE 1 PATCH: 21 PATCH, EXTENDED RELEASE TRANSDERMAL at 10:56

## 2020-11-16 RX ADMIN — SODIUM CHLORIDE, PRESERVATIVE FREE 10 ML: 5 INJECTION INTRAVENOUS at 10:55

## 2020-11-16 RX ADMIN — POTASSIUM CHLORIDE 20 MEQ: 750 TABLET, EXTENDED RELEASE ORAL at 10:54

## 2020-11-16 RX ADMIN — LISINOPRIL 10 MG: 10 TABLET ORAL at 12:56

## 2020-11-16 RX ADMIN — GABAPENTIN 300 MG: 300 CAPSULE ORAL at 21:09

## 2020-11-16 RX ADMIN — GABAPENTIN 300 MG: 300 CAPSULE ORAL at 10:53

## 2020-11-16 RX ADMIN — LACTULOSE 20 G: 20 SOLUTION ORAL at 16:04

## 2020-11-16 RX ADMIN — FUROSEMIDE 40 MG: 10 INJECTION, SOLUTION INTRAMUSCULAR; INTRAVENOUS at 18:38

## 2020-11-16 RX ADMIN — SODIUM CHLORIDE, PRESERVATIVE FREE 10 ML: 5 INJECTION INTRAVENOUS at 21:20

## 2020-11-16 RX ADMIN — FAMOTIDINE 20 MG: 10 INJECTION INTRAVENOUS at 10:55

## 2020-11-16 RX ADMIN — DOCUSATE SODIUM 100 MG: 100 CAPSULE, LIQUID FILLED ORAL at 11:41

## 2020-11-16 NOTE — PROGRESS NOTES
[facility] Progress Note       Encounter Date:20  Patient:Colten Hager  :1970  MRN:[id number]      Chief Complaint: Follow-up congestive heart failure      Subjective:        Patient sleeping this morning.  No events overnight.    Review of Systems:  Review of Systems   Constitution: Positive for malaise/fatigue.   Cardiovascular: Negative for chest pain.   Respiratory: Positive for shortness of breath.        Medications:    Current Facility-Administered Medications:   •  acetaminophen (TYLENOL) tablet 650 mg, 650 mg, Oral, Q4H PRN, 650 mg at 11/15/20 1347 **OR** acetaminophen (TYLENOL) 160 MG/5ML solution 650 mg, 650 mg, Oral, Q4H PRN **OR** acetaminophen (TYLENOL) suppository 650 mg, 650 mg, Rectal, Q4H PRN, Sarah Wei APRN  •  carvedilol (COREG) tablet 3.125 mg, 3.125 mg, Oral, BID With Meals, Michael Ward MD, 3.125 mg at 11/15/20 1700  •  famotidine (PEPCID) injection 20 mg, 20 mg, Intravenous, Q12H, Paula Davis MD, 20 mg at 11/15/20 2105  •  furosemide (LASIX) injection 40 mg, 40 mg, Intravenous, Q12H, Paula Davis MD, 40 mg at 11/15/20 2105  •  gabapentin (NEURONTIN) capsule 300 mg, 300 mg, Oral, TID, Nathaniel Beebe APRN, 300 mg at 11/15/20 2105  •  nicotine (NICODERM CQ) 21 MG/24HR patch 1 patch, 1 patch, Transdermal, Q24H, Michael Ward MD, 1 patch at 11/15/20 1115  •  nitroglycerin (NITROSTAT) SL tablet 0.4 mg, 0.4 mg, Sublingual, Q5 Min PRN, Sarah Wei APRN  •  ondansetron (ZOFRAN) injection 4 mg, 4 mg, Intravenous, Q6H PRN, Sarah Wei APRN  •  potassium chloride (K-DUR,KLOR-CON) ER tablet 20 mEq, 20 mEq, Oral, BID With Meals, Paula Davis MD, 20 mEq at 11/15/20 1703  •  potassium chloride (K-DUR,KLOR-CON) ER tablet 40 mEq, 40 mEq, Oral, PRN **OR** potassium chloride (KLOR-CON) packet 40 mEq, 40 mEq, Oral, PRN **OR** potassium chloride 10 mEq in 100 mL IVPB, 10 mEq, Intravenous, Q1H PRN, Michael Ward MD  •   [COMPLETED] Insert peripheral IV, , , Once **AND** sodium chloride 0.9 % flush 10 mL, 10 mL, Intravenous, PRN, Xander Wolfe MD  •  sodium chloride 0.9 % flush 10 mL, 10 mL, Intravenous, Q12H, Sarah Wei APRN, 10 mL at 11/15/20 2106  •  sodium chloride 0.9 % flush 10 mL, 10 mL, Intravenous, PRN, Sarah Wei APRN       Objective:       Vitals:    11/15/20 2205 11/15/20 2252 11/16/20 0345 11/16/20 0642   BP:  135/91 122/90    BP Location:  Right arm Right arm    Patient Position:  Lying Lying    Pulse: 86 82 78 85   Resp:  18 18    Temp:  97.5 °F (36.4 °C) 96.9 °F (36.1 °C)    TempSrc:  Oral Temporal    SpO2: 94% 97% 98% 100%   Weight:    85.5 kg (188 lb 6.4 oz)   Height:               Physical Exam:  Constitutional: Well appearing, well developed, no acute distress   HENT: Oropharynx clear and membrane moist  Eyes: Normal conjunctiva, no sclera icterus.  Neck: Supple, no carotid bruit bilaterally.  Cardiovascular: Regular rate and rhythm, No Murmur, No bilateral lower extremity edema.  Pulmonary: Normal respiratory effort, normal lung sounds, no wheezing.  Abdominal: Soft, nontender, no hepatosplenomegaly, liver is non-pulsatile.  Neurological: Alert and orient x 3.   Skin: Warm, dry, no ecchymosis, no rash.  Psych: Appropriate mood and affect. Normal judgment and insight.         Lab Review:   Lab Results   Component Value Date    GLUCOSE 107 (H) 11/16/2020    BUN 15 11/16/2020    CREATININE 1.04 11/16/2020    EGFRIFNONA 76 11/16/2020    BCR 14.4 11/16/2020    K 4.3 11/16/2020    CO2 25.8 11/16/2020    CALCIUM 8.9 11/16/2020    ALBUMIN 3.70 11/15/2020    LABIL2 1.2 03/22/2019    AST 20 11/15/2020    ALT 17 11/15/2020       Lab Results   Component Value Date    WBC 9.11 11/16/2020    HGB 13.1 11/16/2020    HCT 39.1 11/16/2020    MCV 89.9 11/16/2020     11/16/2020       Lab Results   Component Value Date    CKTOTAL 110 11/15/2020    TROPONINI 0.044 (H) 11/14/2020    TROPONINT <0.010  11/15/2020       Lab Results   Component Value Date    CHOL 119 11/16/2020     Lab Results   Component Value Date    TRIG 60 11/16/2020     Lab Results   Component Value Date    HDL 32 (L) 11/16/2020     Lab Results   Component Value Date    LDL 74 11/16/2020       Lab Results   Component Value Date    TSH 2.090 11/15/2020     Echocardiogram 11/15/2020:  · Calculated left ventricular EF = 25% Estimated left ventricular EF was in agreement with the calculated left ventricular EF. Left ventricular systolic function is severely decreased.  · The left ventricular cavity is moderate to severely dilated.  · The following left ventricular wall segments are hypokinetic: mid anterior, apical anterior, basal anterolateral, mid anterolateral, apical lateral, basal inferolateral, mid inferolateral, apical inferior, mid inferior, apical septal, basal inferoseptal, mid inferoseptal, apex hypokinetic, mid anteroseptal, basal anterior, basal inferior and basal inferoseptal.  · Left ventricular diastolic function is consistent with (grade III w/high LAP) reversible restrictive pattern.  · The right atrial cavity is moderately dilated.  · Left atrial volume is moderately increased.  · Mild aortic valve regurgitation is present.  · Mild aortic valve regurgitation is present.  · Mild to moderate tricuspid valve regurgitation is present.  · Estimated right ventricular systolic pressure from tricuspid regurgitation is mildly elevated (35-45 mmHg).         Assessment:          Diagnosis Plan   1. Acute systolic congestive heart failure (CMS/HCC)  Case Request Cath Lab: Left Heart Cath    Case Request Cath Lab: Left Heart Cath    Cardiac Catheterization/Vascular Study    Cardiac Catheterization/Vascular Study   2. Acute congestive heart failure, unspecified heart failure type (CMS/HCC)     3. Bilateral pleural effusion     4. Shortness of breath     5. Cardiomegaly            Plan:       Mr. Colten Hager is a 49 y.o gentleman with a  history of hypertension and methamphetamine use who presented to our emergency room with worsening shortness of breath over the last 4 days.  Chest x-ray and laboratory findings concerning for congestive heart failure for which is why we were consulted.  His echocardiogram does demonstrate global hypokinesis with a severely reduced ejection fraction.  I think this likely is related to his methamphetamine use along with undertreated hypertension.  He has responded well to diuresis and afterload reduction.  We will continue carvedilol and will uptitrate the dose as tolerated.    Plan for cardiac catheterization this afternoon and consider adding ARB pending findings as well as better tailor diuretic therapy.    Acute systolic congestive heart failure:  · Unclear etiology but drug use high probability  · Cardiac catheterization tomorrow to rule out ischemic etiology  · Continue carvedilol  · Add losartan after cardiac catheterization pending findings  · We will titrate diuretics based on filling pressures at time of cardiac catheterization tomorrow we will hold for further diuresis today.     Hypertension:  · Starting carvedilol and will monitor response  · Likely add on ARB pending cardiac catheterization findings     Tobacco and methamphetamine use:  · Educated the importance of abstaining from both of these for optimal heart health and to avoid any further deterioration of his cardiac function               Michael Ward MD  [facility]  11/16/20  08:30 EST

## 2020-11-16 NOTE — PROGRESS NOTES
Chart was reviewed and met with wife.  Wife provided information that patient was on methamphetamine prior to stating reason for this clinician to see patient.  Patient currently in Cath Lab.  Access center will turn later to see patient as he wakes up.

## 2020-11-16 NOTE — PROGRESS NOTES
Discharge Planning Assessment  [facility]     Patient Name: Colten Hager  MRN: [id number]  Today's Date: 11/16/2020    Admit Date: 11/15/2020    Discharge Needs Assessment     Row Name 11/16/20 1404       Living Environment    Lives With  spouse    Current Living Arrangements  home/apartment/condo    Primary Care Provided by  self    Able to Return to Prior Arrangements  yes       Transition Planning    Patient/Family Anticipated Services at Transition  none    Transportation Anticipated  family or friend will provide       Discharge Needs Assessment    Equipment Currently Used at Home  none    Concerns to be Addressed  no discharge needs identified;denies needs/concerns at this time        Discharge Plan     Row Name 11/16/20 1404       Plan    Plan  plans are home// no needs    Plan Comments  CCP spoke with pt and wife @ bedside, pt kept falling asleep, wife verified face sheet and pharmacy info.  Pt states he does have a primary MD, but just can't remember her name right now.  Pt wants to return home.  Instructed that CCP would follow and assist as needed. Diamante Osborne RN        Continued Care and Services - Admitted Since 11/15/2020    Coordination has not been started for this encounter.       Expected Discharge Date and Time     Expected Discharge Date Expected Discharge Time    Nov 17, 2020         Demographic Summary    No documentation.       Functional Status     Row Name 11/16/20 0853       Functional Status    Usual Activity Tolerance  good    Current Activity Tolerance  good       Functional Status, IADL    Medications  independent       Mental Status Summary    Recent Changes in Mental Status/Cognitive Functioning  no changes        Psychosocial    No documentation.       Abuse/Neglect    No documentation.       Legal     Row Name 11/16/20 1404       Financial/Legal    Who Manages Finances if Patient Unable  No living will or HCS        Substance Abuse    No documentation.       Patient Forms     No documentation.           Diamante Osborne RN

## 2020-11-16 NOTE — PROGRESS NOTES
Name: Colten Hager ADMIT: 11/15/2020   : 1970  PCP: Provider, No Known    MRN: [id number] LOS: 1 days   AGE/SEX: 49 y.o. male  ROOM: 4/     Subjective   Subjective   Patient with decreased myalgia after dose of Valium yesterday.  Positive dyspnea.  Positive paroxysmal nocturnal dyspnea.  Decreased lower extremity edema.  No chest pain.  No palpitation.  No cough.  No wheeze.  No hemoptysis.  No fever or chills.    Review of Systems  GI.  No abdominal pain and no nausea or vomiting.  Complaining of no bowel movement since admission.  .  No dysuria or hematuria.     Objective   Objective   Vital Signs  Temp:  [96.9 °F (36.1 °C)-98.2 °F (36.8 °C)] 97.1 °F (36.2 °C)  Heart Rate:  [78-93] 91  Resp:  [14-18] 16  BP: (122-141)/() 141/107  SpO2:  [91 %-100 %] 91 %  on  Flow (L/min):  [2-3] 3;   Device (Oxygen Therapy): nasal cannula    Intake/Output Summary (Last 24 hours) at 2020 1055  Last data filed at 2020 1015  Gross per 24 hour   Intake 225 ml   Output 2075 ml   Net -1850 ml     Body mass index is 27.82 kg/m².      11/15/20  0622 11/15/20  0918 20  0642   Weight: 91.1 kg (200 lb 12.8 oz) 90.7 kg (200 lb) 85.5 kg (188 lb 6.4 oz)     Physical Exam  Temperature 97.1 a pulse of 91 respiratory rate of 16 and blood pressure 141/107 O2 sats of 91% on 3 L  General.  Middle-aged gentleman.  Alert oriented x3 no apparent pain distress or diaphoresis normal mood and affect  Eyes.  Pupils equal round and reactive intact extraocular musculature no pallor no jaundice normal conjunctive and lids  ENT.  Moist mucous membrane  Neck.  Supple no JVD no carotid bruit no lymphadenopathy no thyromegaly  Cardiovascular.  Regular rate and rhythm no gallops or murmurs  Chest.  Fine crackles in the right lung base.  Abdomen.  Soft lax no tenderness no organomegaly no guarding or rebound  Extremities.    no clubbing or cyanosis.  Trace bilateral lower extremity edema (improved from yesterday )  CNS.   No acute focal neurological deficits    Results Review:      Results from last 7 days   Lab Units 11/16/20  0431 11/15/20  0841 11/15/20  0357   SODIUM mmol/L 137 142 139   POTASSIUM mmol/L 4.3 4.0 4.1   CHLORIDE mmol/L 100 100 103   CO2 mmol/L 25.8 29.8* 25.4   BUN mg/dL 15 16 16   CREATININE mg/dL 1.04 1.20 1.03   GLUCOSE mg/dL 107* 91 104*   CALCIUM mg/dL 8.9 8.9 8.8   AST (SGOT) U/L  --  20 20   ALT (SGPT) U/L  --  17 19     Estimated Creatinine Clearance: 92.1 mL/min (by C-G formula based on SCr of 1.04 mg/dL).          Results from last 7 days   Lab Units 11/15/20  0841 11/15/20  0357 11/14/20  1241   CK TOTAL U/L 110  --   --    TROPONIN I ng/mL  --   --  0.044*   TROPONIN T ng/mL <0.010 <0.010  --      Results from last 7 days   Lab Units 11/15/20  0357   PROBNP pg/mL 2,759.0*     Results from last 7 days   Lab Units 11/15/20  0841 11/15/20  0357   TSH uIU/mL 2.090 2.140     Results from last 7 days   Lab Units 11/15/20  0841 11/15/20  0357   MAGNESIUM mg/dL 2.0 1.9     Results from last 7 days   Lab Units 11/16/20 0431   CHOLESTEROL mg/dL 119   TRIGLYCERIDES mg/dL 60   HDL CHOL mg/dL 32*     Results from last 7 days   Lab Units 11/16/20 0431 11/15/20  0841 11/15/20  0357  11/14/20  1241   WBC 10*3/mm3 9.11 9.40 8.57  --  6.99   HEMOGLOBIN g/dL 13.1 11.9* 11.9*  --  11.9*   HEMATOCRIT % 39.1 36.5* 35.5*  --  38.1*   PLATELETS 10*3/mm3 182 168 151  --  144   MCV fL 89.9 91.3 89.4   < > 94.5   MCH pg 30.1 29.8 30.0   < > 29.5   MCHC g/dL 33.5 32.6 33.5   < > 31.2   RDW % 12.3 12.4 12.3   < > 12.8   RDW-SD fl 40.2 40.4 40.1   < >  --    MPV fL 11.0 10.8 11.1   < > 10.9   NEUTROPHIL % %  --  65.2 61.1   < > 63.7   LYMPHOCYTE % %  --  21.3 25.0   < > 22.7   MONOCYTES % %  --  11.0 11.2   < > 11.6   EOSINOPHIL % %  --  1.3 1.3   < > 1.1   BASOPHIL % %  --  0.9 0.9   < > 0.6   IMM GRAN % %  --  0.3 0.5   < > 0.3   NEUTROS ABS 10*3/mm3  --  6.14 5.24   < > 4.45   LYMPHS ABS 10*3/mm3  --  2.00 2.14   < > 1.59    MONOS ABS 10*3/mm3  --  1.03* 0.96*   < > 0.81   EOS ABS 10*3/mm3  --  0.12 0.11   < > 0.08   BASOS ABS 10*3/mm3  --  0.08 0.08   < > 0.04   IMMATURE GRANS (ABS) 10*3/mm3  --  0.03 0.04   < > 0.02   NRBC /100 WBC  --  0.0 0.0   < > 0    < > = values in this interval not displayed.     Results from last 7 days   Lab Units 11/15/20  0357   INR  1.23*                         Results from last 7 days   Lab Units 11/15/20  0410   ADENOVIRUS DETECTION BY PCR  Not Detected   CORONAVIRUS 229E  Not Detected   CORONAVIRUS HKU1  Not Detected   CORONAVIRUS NL63  Not Detected   CORONAVIRUS OC43  Not Detected   HUMAN METAPNEUMOVIRUS  Not Detected   HUMAN RHINOVIRUS/ENTEROVIRUS  Not Detected   INFLUENZA B PCR  Not Detected   PARAINFLUENZA 1  Not Detected   PARAINFLUENZA VIRUS 2  Not Detected   PARAINFLUENZA VIRUS 3  Not Detected   PARAINFLUENZA VIRUS 4  Not Detected   BORDETELLA PERTUSSIS PCR  Not Detected   CHLAMYDOPHILA PNEUMONIAE PCR  Not Detected   MYCOPLAMA PNEUMO PCR  Not Detected   INFLUENZA A PCR  Not Detected   RSV, PCR  Not Detected     Results from last 7 days   Lab Units 11/15/20  0410   NITRITE UA  Negative   WBC UA /HPF 0-2   BACTERIA UA /HPF None Seen   SQUAM EPITHEL UA /HPF 0-2           Imaging:  Imaging Results (Last 24 Hours)     ** No results found for the last 24 hours. **             I reviewed the patient's new clinical results / labs / tests / procedures      Assessment/Plan     Active Hospital Problems    Diagnosis  POA   • **Systolic CHF, acute (CMS/HCC) [I50.21]  Yes   • Essential hypertension [I10]  Yes   • Substance abuse (CMS/HCC) [F19.10]  Yes   • Dyslipidemia [E78.5]  Yes   • GERD without esophagitis [K21.9]  Yes   • Methamphetamine abuse (CMS/[facility]) [F15.10]  Yes   • IV drug abuse (CMS/[facility]) [F19.10]  Yes   • Tobacco abuse [Z72.0]  Yes   • Acute systolic congestive heart failure (CMS/HCC) [I50.21]  Unknown      Resolved Hospital Problems   No resolved problems to display.       1.  Acute systolic  congestive heart failure exacerbation in patient with a history of hypertension.  Elevated BNP.  Mildly elevated troponin initially with normalization.  Slowly improving.  Cardiac catheterization showed no coronary artery disease and an ejection fraction of 10% with moderate MR.  Continue Coreg and will add lisinopril.  Continue IV Lasix.  Will benefit from Aldactone at the time of discharge.  The systolic heart failure is most likely secondary to cardiomyopathy because of amphetamine use   2.  History of dyslipidemia.    Acceptable lipid profile.  3.  IV meth amphetamine use.    Awaiting access center consultation.  Negative HIV status and hepatitis profile.  There is no evidence clinically or by echo of endocarditis.  4.  GERD.  Asymptomatic.  IV Pepcid.  5.  VTE prophylaxis with sequential compression device.  6.  Myalgia.  Normal potassium/magnesium/TSH/CK.  Improved after single dose of Valium.  Will avoid giving any more Valium.  7.  Constipation.  Plan Colace and lactulose.  · Discussed with the patient/wife.  · Disposition to be determined based on clinical course.      Paula Davis MD  [facility]  11/16/20  10:55 EST

## 2020-11-17 ENCOUNTER — APPOINTMENT (OUTPATIENT)
Dept: CARDIOLOGY | Facility: HOSPITAL | Age: 50
End: 2020-11-17

## 2020-11-17 VITALS
RESPIRATION RATE: 16 BRPM | DIASTOLIC BLOOD PRESSURE: 75 MMHG | TEMPERATURE: 98 F | HEART RATE: 95 BPM | HEIGHT: 69 IN | SYSTOLIC BLOOD PRESSURE: 126 MMHG | OXYGEN SATURATION: 99 % | WEIGHT: 189.7 LBS | BODY MASS INDEX: 28.1 KG/M2

## 2020-11-17 PROBLEM — I42.0 DILATED CARDIOMYOPATHY (HCC): Status: ACTIVE | Noted: 2020-11-17

## 2020-11-17 PROBLEM — I82.462 ACUTE DEEP VEIN THROMBOSIS (DVT) OF CALF MUSCLE VEIN OF LEFT LOWER EXTREMITY (HCC): Status: ACTIVE | Noted: 2020-11-17

## 2020-11-17 LAB
ANION GAP SERPL CALCULATED.3IONS-SCNC: 9.8 MMOL/L (ref 5–15)
BH CV LOW VAS LEFT PERONEAL VESSEL: 1
BH CV LOWER VASCULAR LEFT COMMON FEMORAL AUGMENT: NORMAL
BH CV LOWER VASCULAR LEFT COMMON FEMORAL COMPETENT: NORMAL
BH CV LOWER VASCULAR LEFT COMMON FEMORAL COMPRESS: NORMAL
BH CV LOWER VASCULAR LEFT COMMON FEMORAL PHASIC: NORMAL
BH CV LOWER VASCULAR LEFT COMMON FEMORAL SPONT: NORMAL
BH CV LOWER VASCULAR LEFT DISTAL FEMORAL COMPRESS: NORMAL
BH CV LOWER VASCULAR LEFT GASTRONEMIUS COMPRESS: NORMAL
BH CV LOWER VASCULAR LEFT GREATER SAPH AK COMPRESS: NORMAL
BH CV LOWER VASCULAR LEFT GREATER SAPH BK COMPRESS: NORMAL
BH CV LOWER VASCULAR LEFT LESSER SAPH COMPRESS: NORMAL
BH CV LOWER VASCULAR LEFT MID FEMORAL AUGMENT: NORMAL
BH CV LOWER VASCULAR LEFT MID FEMORAL COMPETENT: NORMAL
BH CV LOWER VASCULAR LEFT MID FEMORAL COMPRESS: NORMAL
BH CV LOWER VASCULAR LEFT MID FEMORAL PHASIC: NORMAL
BH CV LOWER VASCULAR LEFT MID FEMORAL SPONT: NORMAL
BH CV LOWER VASCULAR LEFT PERONEAL COMPRESS: NORMAL
BH CV LOWER VASCULAR LEFT PERONEAL THROMBUS: NORMAL
BH CV LOWER VASCULAR LEFT POPLITEAL AUGMENT: NORMAL
BH CV LOWER VASCULAR LEFT POPLITEAL COMPETENT: NORMAL
BH CV LOWER VASCULAR LEFT POPLITEAL COMPRESS: NORMAL
BH CV LOWER VASCULAR LEFT POPLITEAL PHASIC: NORMAL
BH CV LOWER VASCULAR LEFT POPLITEAL SPONT: NORMAL
BH CV LOWER VASCULAR LEFT POSTERIOR TIBIAL COMPRESS: NORMAL
BH CV LOWER VASCULAR LEFT PROFUNDA FEMORAL COMPRESS: NORMAL
BH CV LOWER VASCULAR LEFT PROXIMAL FEMORAL COMPRESS: NORMAL
BH CV LOWER VASCULAR LEFT SAPHENOFEMORAL JUNCTION COMPRESS: NORMAL
BH CV LOWER VASCULAR RIGHT COMMON FEMORAL AUGMENT: NORMAL
BH CV LOWER VASCULAR RIGHT COMMON FEMORAL COMPETENT: NORMAL
BH CV LOWER VASCULAR RIGHT COMMON FEMORAL COMPRESS: NORMAL
BH CV LOWER VASCULAR RIGHT COMMON FEMORAL PHASIC: NORMAL
BH CV LOWER VASCULAR RIGHT COMMON FEMORAL SPONT: NORMAL
BH CV LOWER VASCULAR RIGHT DISTAL FEMORAL COMPRESS: NORMAL
BH CV LOWER VASCULAR RIGHT GASTRONEMIUS COMPRESS: NORMAL
BH CV LOWER VASCULAR RIGHT GREATER SAPH AK COMPRESS: NORMAL
BH CV LOWER VASCULAR RIGHT GREATER SAPH BK COMPRESS: NORMAL
BH CV LOWER VASCULAR RIGHT LESSER SAPH COMPRESS: NORMAL
BH CV LOWER VASCULAR RIGHT MID FEMORAL AUGMENT: NORMAL
BH CV LOWER VASCULAR RIGHT MID FEMORAL COMPETENT: NORMAL
BH CV LOWER VASCULAR RIGHT MID FEMORAL COMPRESS: NORMAL
BH CV LOWER VASCULAR RIGHT MID FEMORAL PHASIC: NORMAL
BH CV LOWER VASCULAR RIGHT MID FEMORAL SPONT: NORMAL
BH CV LOWER VASCULAR RIGHT PERONEAL COMPRESS: NORMAL
BH CV LOWER VASCULAR RIGHT POPLITEAL AUGMENT: NORMAL
BH CV LOWER VASCULAR RIGHT POPLITEAL COMPETENT: NORMAL
BH CV LOWER VASCULAR RIGHT POPLITEAL COMPRESS: NORMAL
BH CV LOWER VASCULAR RIGHT POPLITEAL PHASIC: NORMAL
BH CV LOWER VASCULAR RIGHT POPLITEAL SPONT: NORMAL
BH CV LOWER VASCULAR RIGHT POSTERIOR TIBIAL COMPRESS: NORMAL
BH CV LOWER VASCULAR RIGHT PROFUNDA FEMORAL COMPRESS: NORMAL
BH CV LOWER VASCULAR RIGHT PROXIMAL FEMORAL COMPRESS: NORMAL
BH CV LOWER VASCULAR RIGHT SAPHENOFEMORAL JUNCTION COMPRESS: NORMAL
BUN SERPL-MCNC: 15 MG/DL (ref 6–20)
BUN/CREAT SERPL: 17.4 (ref 7–25)
CALCIUM SPEC-SCNC: 9 MG/DL (ref 8.6–10.5)
CHLORIDE SERPL-SCNC: 102 MMOL/L (ref 98–107)
CO2 SERPL-SCNC: 29.2 MMOL/L (ref 22–29)
CREAT SERPL-MCNC: 0.86 MG/DL (ref 0.76–1.27)
GFR SERPL CREATININE-BSD FRML MDRD: 94 ML/MIN/1.73
GLUCOSE SERPL-MCNC: 91 MG/DL (ref 65–99)
POTASSIUM SERPL-SCNC: 4.1 MMOL/L (ref 3.5–5.2)
QT INTERVAL: 394 MS
SODIUM SERPL-SCNC: 141 MMOL/L (ref 136–145)

## 2020-11-17 PROCEDURE — 25010000002 FUROSEMIDE PER 20 MG: Performed by: INTERNAL MEDICINE

## 2020-11-17 PROCEDURE — 93010 ELECTROCARDIOGRAM REPORT: CPT | Performed by: INTERNAL MEDICINE

## 2020-11-17 PROCEDURE — 93005 ELECTROCARDIOGRAM TRACING: CPT | Performed by: INTERNAL MEDICINE

## 2020-11-17 PROCEDURE — 99233 SBSQ HOSP IP/OBS HIGH 50: CPT | Performed by: INTERNAL MEDICINE

## 2020-11-17 PROCEDURE — 80048 BASIC METABOLIC PNL TOTAL CA: CPT | Performed by: INTERNAL MEDICINE

## 2020-11-17 PROCEDURE — 93970 EXTREMITY STUDY: CPT

## 2020-11-17 RX ORDER — RIVAROXABAN 15 MG-20MG
KIT ORAL
Qty: 51 TABLET | Refills: 0 | Status: SHIPPED | OUTPATIENT
Start: 2020-11-17 | End: 2021-08-30

## 2020-11-17 RX ORDER — SPIRONOLACTONE 25 MG/1
25 TABLET ORAL DAILY
Status: DISCONTINUED | OUTPATIENT
Start: 2020-11-17 | End: 2020-11-17 | Stop reason: HOSPADM

## 2020-11-17 RX ORDER — FUROSEMIDE 40 MG/1
40 TABLET ORAL DAILY
Qty: 30 TABLET | Refills: 0 | Status: SHIPPED | OUTPATIENT
Start: 2020-11-17 | End: 2020-11-19 | Stop reason: SDUPTHER

## 2020-11-17 RX ORDER — CARVEDILOL 3.12 MG/1
3.12 TABLET ORAL 2 TIMES DAILY WITH MEALS
Qty: 60 TABLET | Refills: 0 | Status: SHIPPED | OUTPATIENT
Start: 2020-11-17 | End: 2021-08-30

## 2020-11-17 RX ORDER — FUROSEMIDE 40 MG/1
40 TABLET ORAL DAILY
Status: DISCONTINUED | OUTPATIENT
Start: 2020-11-17 | End: 2020-11-17 | Stop reason: HOSPADM

## 2020-11-17 RX ORDER — POLYETHYLENE GLYCOL 3350 17 G/17G
17 POWDER, FOR SOLUTION ORAL DAILY PRN
Status: DISCONTINUED | OUTPATIENT
Start: 2020-11-17 | End: 2020-11-17 | Stop reason: HOSPADM

## 2020-11-17 RX ORDER — LISINOPRIL 10 MG/1
10 TABLET ORAL
Qty: 30 TABLET | Refills: 0 | Status: SHIPPED | OUTPATIENT
Start: 2020-11-18 | End: 2020-12-03

## 2020-11-17 RX ORDER — SPIRONOLACTONE 25 MG/1
25 TABLET ORAL DAILY
Qty: 30 TABLET | Refills: 0 | Status: SHIPPED | OUTPATIENT
Start: 2020-11-17 | End: 2022-03-02 | Stop reason: SDUPTHER

## 2020-11-17 RX ADMIN — NICOTINE 1 PATCH: 21 PATCH, EXTENDED RELEASE TRANSDERMAL at 09:40

## 2020-11-17 RX ADMIN — CARVEDILOL 3.12 MG: 3.12 TABLET, FILM COATED ORAL at 09:27

## 2020-11-17 RX ADMIN — FAMOTIDINE 20 MG: 10 INJECTION INTRAVENOUS at 09:27

## 2020-11-17 RX ADMIN — FUROSEMIDE 40 MG: 40 TABLET ORAL at 12:50

## 2020-11-17 RX ADMIN — FUROSEMIDE 40 MG: 10 INJECTION, SOLUTION INTRAMUSCULAR; INTRAVENOUS at 05:46

## 2020-11-17 RX ADMIN — LISINOPRIL 10 MG: 10 TABLET ORAL at 09:27

## 2020-11-17 RX ADMIN — POTASSIUM CHLORIDE 20 MEQ: 750 TABLET, EXTENDED RELEASE ORAL at 09:28

## 2020-11-17 RX ADMIN — SPIRONOLACTONE 25 MG: 25 TABLET ORAL at 12:50

## 2020-11-17 RX ADMIN — SODIUM CHLORIDE, PRESERVATIVE FREE 10 ML: 5 INJECTION INTRAVENOUS at 09:28

## 2020-11-17 RX ADMIN — DOCUSATE SODIUM 100 MG: 100 CAPSULE, LIQUID FILLED ORAL at 09:40

## 2020-11-17 RX ADMIN — GABAPENTIN 300 MG: 300 CAPSULE ORAL at 09:27

## 2020-11-17 NOTE — PROGRESS NOTES
Bilateral lower venous doppler complete and preliminary is positive for acute left calf vein dvt and right is negative for dvt to [place]

## 2020-11-17 NOTE — CONSULTS
Patient is a 49-year-old   male who came to the emergency room with a gradual onset of intermittent periods of shortness of air.  He had a cardiac cath today which found him to have an ejection fraction of approximately 10%, with severe left ventricular systolic dysfunction.  [facility] was asked to see patient due to a positive drug screen of methamphetamine.  Patient admits to using meth multiple times a week up to 1 g a day.  He states that he snorts as well as does it IV.  His last meth use was 5 days ago.  He denies alcohol use.  He admits to smoking 1 pack/day cigarettes for 20 years.  No history of SI or HI.  He does have a history of accidentally overdosing on drugs in the past.    Patient to [facility] years ago and left before he completed the program.  No other substance use program treatment.  He states that he did attend  at that time but has not been to  for years.  Patient states that his mother was possibly schizophrenic.    Patient has not been able to work.  His income is through his wife.  He has a high school education.  He has no leisure activities.  He and wife live in an apartment.    Patient was alert and oriented x4.  No SI or HI.  He was able to get up and walk around the unit.  No audio visual hallucinations.  No tremors.  He was provided a list of outpatient resources for substance use treatment.  He was encouraged to get help to assist him in maintaining any sobriety.  Wife is very supportive.  He seems to think he is can quit on his own given his current medical condition and limited prognosis.  [facility] will follow very briefly.

## 2020-11-17 NOTE — PROGRESS NOTES
Patient Name: Colten Hager  Patient : 1970        Date of Service:20  Provider of Service: Fletcher Mcmanus MD  Place of Service: [facility]  Referral Provider: James Castellanos MD          Follow Up: Congestive heart failure    Interval Hx: Cardiac catheterization indicates normal coronary arteries, poor left ventricular function, elevated LVEDP.  He desires to be discharged.  He states he does feel better.    New diagnosis DVT of left lower extremity.  Rivaroxaban initiated.        OBJECTIVE  Temp:  [97.6 °F (36.4 °C)-98.5 °F (36.9 °C)] 98.5 °F (36.9 °C)  Heart Rate:  [88-92] 90  Resp:  [16] 16  BP: (115-142)/() 115/77     Intake/Output Summary (Last 24 hours) at 2020 1158  Last data filed at 2020 0632  Gross per 24 hour   Intake 480 ml   Output 700 ml   Net -220 ml     Body mass index is 28.01 kg/m².      11/15/20  0918 20  0642 20  0400   Weight: 90.7 kg (200 lb) 85.5 kg (188 lb 6.4 oz) 86 kg (189 lb 11.2 oz)         Physical Exam:   Vitals signs reviewed.   Constitutional:       Appearance: Well-developed.   Eyes:      Pupils: Pupils are equal, round, and reactive to light.   HENT:      Head: Normocephalic.   Neck:      Musculoskeletal: Normal range of motion.      Thyroid: No thyromegaly.      Vascular: No carotid bruit or JVD.   Pulmonary:      Effort: Pulmonary effort is normal.      Breath sounds: Normal breath sounds.   Cardiovascular:      Normal rate. Regular rhythm.      No gallop.   Pulses:     Intact distal pulses.   Edema:     Peripheral edema absent.   Abdominal:      General: Bowel sounds are normal.      Palpations: Abdomen is soft.   Skin:     General: Skin is warm and dry.      Findings: No erythema.   Neurological:      Mental Status: Alert and oriented to person, place, and time.           CURRENT MEDS    Scheduled Meds:carvedilol, 3.125 mg, Oral, BID With Meals  docusate sodium, 100 mg, Oral, BID  famotidine, 20 mg,  Intravenous, Q12H  furosemide, 40 mg, Oral, Daily  gabapentin, 300 mg, Oral, TID  lisinopril, 10 mg, Oral, Q24H  nicotine, 1 patch, Transdermal, Q24H  potassium chloride, 20 mEq, Oral, BID With Meals  rivaroxaban, 15 mg, Oral, BID With Meals    Followed by  [START ON 12/9/2020] rivaroxaban, 20 mg, Oral, Daily With Dinner  sodium chloride, 10 mL, Intravenous, Q12H  spironolactone, 25 mg, Oral, Daily      Continuous Infusions:       Lab Review:   Results from last 7 days   Lab Units 11/17/20  0339 11/16/20  0431 11/15/20  0841 11/15/20  0357   SODIUM mmol/L 141 137 142 139   POTASSIUM mmol/L 4.1 4.3 4.0 4.1   CHLORIDE mmol/L 102 100 100 103   CO2 mmol/L 29.2* 25.8 29.8* 25.4   BUN mg/dL 15 15 16 16   CREATININE mg/dL 0.86 1.04 1.20 1.03   GLUCOSE mg/dL 91 107* 91 104*   CALCIUM mg/dL 9.0 8.9 8.9 8.8   AST (SGOT) U/L  --   --  20 20   ALT (SGPT) U/L  --   --  17 19     Results from last 7 days   Lab Units 11/15/20  0841 11/15/20  0357 11/14/20  1241   CK TOTAL U/L 110  --   --    TROPONIN I ng/mL  --   --  0.044*   TROPONIN T ng/mL <0.010 <0.010  --      Results from last 7 days   Lab Units 11/16/20  0431 11/15/20  0841   WBC 10*3/mm3 9.11 9.40   HEMOGLOBIN g/dL 13.1 11.9*   HEMATOCRIT % 39.1 36.5*   PLATELETS 10*3/mm3 182 168     Results from last 7 days   Lab Units 11/15/20  0357   INR  1.23*     Results from last 7 days   Lab Units 11/15/20  0841 11/15/20  0357   MAGNESIUM mg/dL 2.0 1.9     Results from last 7 days   Lab Units 11/16/20 0431   CHOLESTEROL mg/dL 119   TRIGLYCERIDES mg/dL 60   HDL CHOL mg/dL 32*   LDL CHOL mg/dL 74     Results from last 7 days   Lab Units 11/15/20  0357   PROBNP pg/mL 2,759.0*     Results from last 7 days   Lab Units 11/15/20  0841 11/15/20  0357   TSH uIU/mL 2.090 2.140       I personally reviewed the patient's ECG and telemetry data    ASSESSMENT & PLAN    Systolic CHF, acute (CMS/[facility])    Essential hypertension    Substance abuse (CMS/[facility])    Dyslipidemia    GERD without  esophagitis    Methamphetamine abuse (CMS/[facility])    IV drug abuse (CMS/[facility])    Tobacco abuse    Acute systolic congestive heart failure (CMS/[facility])      1.  Acute systolic congestive heart failure: Etiology noncoronary.  More than likely secondary to methamphetamine and tobacco use.  Switch to oral Lasix and add Aldactone   2.  Hypertension: Stable  3.  Tobacco methamphetamine use: Discussed in detail with the patient.  Patient understands the risk of death is high should he continue his drug habit.    Okay from our standpoint to be discharged.  He is to follow-up with Dr. Ward as an outpatient within 1 to 2 weeks.    Fletcher Mcmanus MD  11/17/20

## 2020-11-18 ENCOUNTER — READMISSION MANAGEMENT (OUTPATIENT)
Dept: CALL CENTER | Facility: HOSPITAL | Age: 50
End: 2020-11-18

## 2020-11-18 NOTE — OUTREACH NOTE
Prep Survey      Responses   [facility] facility patient discharged from?  [facility]   Is LACE score < 7 ?  No   Eligibility  Readm Mgmt   Discharge diagnosis  CHF   Does the patient have one of the following disease processes/diagnoses(primary or secondary)?  CHF   Does the patient have Home health ordered?  No   Is there a DME ordered?  No   Comments regarding appointments  f/u apmts needed   Medication alerts for this patient  lasix, coreg, xarelto   Prep survey completed?  Yes          Kat Clark RN

## 2020-11-19 ENCOUNTER — HOSPITAL ENCOUNTER (EMERGENCY)
Facility: HOSPITAL | Age: 50
Discharge: HOME | End: 2020-11-19
Attending: EMERGENCY MEDICINE | Admitting: EMERGENCY MEDICINE

## 2020-11-19 ENCOUNTER — APPOINTMENT (OUTPATIENT)
Dept: GENERAL RADIOLOGY | Facility: HOSPITAL | Age: 50
End: 2020-11-19

## 2020-11-19 ENCOUNTER — READMISSION MANAGEMENT (OUTPATIENT)
Dept: CALL CENTER | Facility: HOSPITAL | Age: 50
End: 2020-11-19

## 2020-11-19 VITALS
DIASTOLIC BLOOD PRESSURE: 92 MMHG | HEART RATE: 97 BPM | RESPIRATION RATE: 18 BRPM | SYSTOLIC BLOOD PRESSURE: 128 MMHG | TEMPERATURE: 97.9 F | BODY MASS INDEX: 28.01 KG/M2 | HEIGHT: 69 IN | OXYGEN SATURATION: 96 %

## 2020-11-19 DIAGNOSIS — R06.02 SHORTNESS OF BREATH: ICD-10-CM

## 2020-11-19 DIAGNOSIS — I50.9 ACUTE ON CHRONIC CONGESTIVE HEART FAILURE, UNSPECIFIED HEART FAILURE TYPE (HCC): Primary | ICD-10-CM

## 2020-11-19 LAB
ALBUMIN SERPL-MCNC: 3.7 G/DL (ref 3.5–5.2)
ALBUMIN/GLOB SERPL: 1.2 G/DL
ALP SERPL-CCNC: 88 U/L (ref 39–117)
ALT SERPL W P-5'-P-CCNC: 15 U/L (ref 1–41)
AMPHET+METHAMPHET UR QL: POSITIVE
ANION GAP SERPL CALCULATED.3IONS-SCNC: 7.5 MMOL/L (ref 5–15)
AST SERPL-CCNC: 19 U/L (ref 1–40)
BARBITURATES UR QL SCN: NEGATIVE
BASOPHILS # BLD AUTO: 0.08 10*3/MM3 (ref 0–0.2)
BASOPHILS NFR BLD AUTO: 0.8 % (ref 0–1.5)
BENZODIAZ UR QL SCN: NEGATIVE
BILIRUB SERPL-MCNC: 0.3 MG/DL (ref 0–1.2)
BUN SERPL-MCNC: 14 MG/DL (ref 6–20)
BUN/CREAT SERPL: 14.6 (ref 7–25)
CALCIUM SPEC-SCNC: 8.8 MG/DL (ref 8.6–10.5)
CANNABINOIDS SERPL QL: NEGATIVE
CHLORIDE SERPL-SCNC: 101 MMOL/L (ref 98–107)
CO2 SERPL-SCNC: 29.5 MMOL/L (ref 22–29)
COCAINE UR QL: NEGATIVE
CREAT SERPL-MCNC: 0.96 MG/DL (ref 0.76–1.27)
DEPRECATED RDW RBC AUTO: 39.7 FL (ref 37–54)
EOSINOPHIL # BLD AUTO: 0.24 10*3/MM3 (ref 0–0.4)
EOSINOPHIL NFR BLD AUTO: 2.5 % (ref 0.3–6.2)
ERYTHROCYTE [DISTWIDTH] IN BLOOD BY AUTOMATED COUNT: 12.3 % (ref 12.3–15.4)
GFR SERPL CREATININE-BSD FRML MDRD: 83 ML/MIN/1.73
GLOBULIN UR ELPH-MCNC: 3.2 GM/DL
GLUCOSE SERPL-MCNC: 99 MG/DL (ref 65–99)
HCT VFR BLD AUTO: 41 % (ref 37.5–51)
HGB BLD-MCNC: 13.6 G/DL (ref 13–17.7)
IMM GRANULOCYTES # BLD AUTO: 0.03 10*3/MM3 (ref 0–0.05)
IMM GRANULOCYTES NFR BLD AUTO: 0.3 % (ref 0–0.5)
INR PPP: 1.77 (ref 0.9–1.1)
LYMPHOCYTES # BLD AUTO: 2.59 10*3/MM3 (ref 0.7–3.1)
LYMPHOCYTES NFR BLD AUTO: 26.5 % (ref 19.6–45.3)
MAGNESIUM SERPL-MCNC: 1.9 MG/DL (ref 1.6–2.6)
MCH RBC QN AUTO: 29.8 PG (ref 26.6–33)
MCHC RBC AUTO-ENTMCNC: 33.2 G/DL (ref 31.5–35.7)
MCV RBC AUTO: 89.7 FL (ref 79–97)
METHADONE UR QL SCN: NEGATIVE
MONOCYTES # BLD AUTO: 1.22 10*3/MM3 (ref 0.1–0.9)
MONOCYTES NFR BLD AUTO: 12.5 % (ref 5–12)
NEUTROPHILS NFR BLD AUTO: 5.6 10*3/MM3 (ref 1.7–7)
NEUTROPHILS NFR BLD AUTO: 57.4 % (ref 42.7–76)
NRBC BLD AUTO-RTO: 0 /100 WBC (ref 0–0.2)
NT-PROBNP SERPL-MCNC: 1606 PG/ML (ref 0–900)
OPIATES UR QL: NEGATIVE
OXYCODONE UR QL SCN: NEGATIVE
PLATELET # BLD AUTO: 211 10*3/MM3 (ref 140–450)
PMV BLD AUTO: 10 FL (ref 6–12)
POTASSIUM SERPL-SCNC: 4.6 MMOL/L (ref 3.5–5.2)
PROT SERPL-MCNC: 6.9 G/DL (ref 6–8.5)
PROTHROMBIN TIME: 20.4 SECONDS (ref 11.7–14.2)
QT INTERVAL: 378 MS
RBC # BLD AUTO: 4.57 10*6/MM3 (ref 4.14–5.8)
SODIUM SERPL-SCNC: 138 MMOL/L (ref 136–145)
TROPONIN T SERPL-MCNC: 0.02 NG/ML (ref 0–0.03)
WBC # BLD AUTO: 9.76 10*3/MM3 (ref 3.4–10.8)

## 2020-11-19 PROCEDURE — 80053 COMPREHEN METABOLIC PANEL: CPT | Performed by: PHYSICIAN ASSISTANT

## 2020-11-19 PROCEDURE — 80307 DRUG TEST PRSMV CHEM ANLYZR: CPT | Performed by: NURSE PRACTITIONER

## 2020-11-19 PROCEDURE — 93005 ELECTROCARDIOGRAM TRACING: CPT | Performed by: PHYSICIAN ASSISTANT

## 2020-11-19 PROCEDURE — 99284 EMERGENCY DEPT VISIT MOD MDM: CPT

## 2020-11-19 PROCEDURE — 83735 ASSAY OF MAGNESIUM: CPT | Performed by: PHYSICIAN ASSISTANT

## 2020-11-19 PROCEDURE — 83880 ASSAY OF NATRIURETIC PEPTIDE: CPT | Performed by: PHYSICIAN ASSISTANT

## 2020-11-19 PROCEDURE — 84484 ASSAY OF TROPONIN QUANT: CPT | Performed by: PHYSICIAN ASSISTANT

## 2020-11-19 PROCEDURE — 71045 X-RAY EXAM CHEST 1 VIEW: CPT

## 2020-11-19 PROCEDURE — 85610 PROTHROMBIN TIME: CPT | Performed by: PHYSICIAN ASSISTANT

## 2020-11-19 PROCEDURE — 85025 COMPLETE CBC W/AUTO DIFF WBC: CPT | Performed by: PHYSICIAN ASSISTANT

## 2020-11-19 PROCEDURE — 25010000002 FUROSEMIDE PER 20 MG: Performed by: NURSE PRACTITIONER

## 2020-11-19 PROCEDURE — 96374 THER/PROPH/DIAG INJ IV PUSH: CPT

## 2020-11-19 PROCEDURE — 93010 ELECTROCARDIOGRAM REPORT: CPT | Performed by: INTERNAL MEDICINE

## 2020-11-19 RX ORDER — SODIUM CHLORIDE 0.9 % (FLUSH) 0.9 %
10 SYRINGE (ML) INJECTION AS NEEDED
Status: DISCONTINUED | OUTPATIENT
Start: 2020-11-19 | End: 2020-11-19 | Stop reason: HOSPADM

## 2020-11-19 RX ORDER — FUROSEMIDE 10 MG/ML
40 INJECTION INTRAMUSCULAR; INTRAVENOUS ONCE
Status: COMPLETED | OUTPATIENT
Start: 2020-11-19 | End: 2020-11-19

## 2020-11-19 RX ORDER — FUROSEMIDE 40 MG/1
40 TABLET ORAL 2 TIMES DAILY
Qty: 60 TABLET | Refills: 0 | Status: SHIPPED | OUTPATIENT
Start: 2020-11-19 | End: 2021-08-30

## 2020-11-19 RX ADMIN — FUROSEMIDE 40 MG: 10 INJECTION, SOLUTION INTRAMUSCULAR; INTRAVENOUS at 06:52

## 2020-11-19 NOTE — ED TRIAGE NOTES
Pt ambulatory to ED triage c/o SOA. Pt states he was recently admitted and discharged form here on Tuesday with CHF. Pt states his SOA has progressively since discharge. Pt in NAD at this time.    Patient was placed in face mask during first look triage.  Patient was wearing a face mask throughout encounter.  I wore personal protective equipment throughout the encounter.  Hand hygiene was performed before and after patient encounter.

## 2020-11-19 NOTE — ED PROVIDER NOTES
EMERGENCY DEPARTMENT ENCOUNTER    Room Number:  01/01  Date of encounter:  11/19/2020  PCP: Provider, No Known  Historian: Patient      PPE    Patient was placed in face mask in first look. Patient was wearing facemask when I entered the room and throughout our encounter. I wore full protective equipment throughout this patient encounter including a face mask, and gloves. Hand hygiene was performed before donning protective equipment and after removal when leaving the room.        HPI:  Chief Complaint: Shortness of breath  A complete HPI/ROS/PMH/PSH/SH/FH are unobtainable due to: Nothing    Context: Colten Hager is a 49 y.o. male who arrives to the ED via private vehicle from home with his wife.  Patient presents with c/o moderate, worsening, shortness of breath that began after being discharged from the hospital on Tuesday of this week.   Patient also complains of chest tightness that worsens when he tries to take a deep breath, feeling like he cannot get a full breath and nonproductive cough.  Patient denies fever, chills, nausea, vomiting.  Patient states he was discharged from the hospital on Tuesday, felt a little bit better for just a little bit and then began feeling very short of breath again.  He denies missing any of his medication doses.  He states he was diagnosed with a DVT in his left calf during that admission.  Patient is still a smoker.  Patient states that nothing makes the symptoms better and trying to take a deep breath worsens symptoms.          PAST MEDICAL HISTORY  Active Ambulatory Problems     Diagnosis Date Noted   • Systolic CHF, acute (CMS/[facility]) 11/15/2020   • Essential hypertension 11/15/2020   • Substance abuse (CMS/[facility]) 11/15/2020   • Dyslipidemia 11/15/2020   • GERD without esophagitis 11/15/2020   • Methamphetamine abuse (CMS/[facility]) 11/15/2020   • IV drug abuse (CMS/[facility]) 11/15/2020   • Tobacco abuse 11/15/2020   • Acute systolic congestive heart failure (CMS/[facility]) 11/15/2020   • Acute  deep vein thrombosis (DVT) of calf muscle vein of left lower extremity (CMS/[facility]) 11/17/2020   • Dilated cardiomyopathy (CMS/[facility]) 11/17/2020     Resolved Ambulatory Problems     Diagnosis Date Noted   • No Resolved Ambulatory Problems     Past Medical History:   Diagnosis Date   • Cellulitis    • CHF (congestive heart failure) (CMS/[facility])    • GERD (gastroesophageal reflux disease)    • Hyperlipidemia    • Hypertension          PAST SURGICAL HISTORY  Past Surgical History:   Procedure Laterality Date   • CARDIAC CATHETERIZATION N/A 11/16/2020    Procedure: Left Heart Cath;  Surgeon: Fletcher Mcmanus MD;  Location: [facility] CATH INVASIVE LOCATION;  Service: Cardiovascular;  Laterality: N/A;   • CARDIAC CATHETERIZATION N/A 11/16/2020    Procedure: Coronary angiography;  Surgeon: Fletcher Mcmanus MD;  Location:  [facility] CATH INVASIVE LOCATION;  Service: Cardiovascular;  Laterality: N/A;   • CARDIAC CATHETERIZATION N/A 11/16/2020    Procedure: Left ventriculography;  Surgeon: Fletcher Mcmanus MD;  Location: [facility] CATH INVASIVE LOCATION;  Service: Cardiovascular;  Laterality: N/A;         FAMILY HISTORY  History reviewed. No pertinent family history.      SOCIAL HISTORY  Social History     Socioeconomic History   • Marital status:      Spouse name: Not on file   • Number of children: Not on file   • Years of education: Not on file   • Highest education level: Not on file   Tobacco Use   • Smoking status: Current Every Day Smoker     Packs/day: 1.00   • Smokeless tobacco: Never Used   Substance and Sexual Activity   • Alcohol use: No   • Drug use: Yes     Types: Methamphetamines     Comment: last meth use 11/12/2020   • Sexual activity: Defer         ALLERGIES  Patient has no known allergies.        REVIEW OF SYSTEMS  Review of Systems     All systems reviewed and negative except for those discussed in HPI.        PHYSICAL EXAM    ED Triage Vitals   Temp Heart Rate Resp BP SpO2   11/19/20 0458 11/19/20 0458  11/19/20 0458 11/19/20 0510 11/19/20 0458   97.9 °F (36.6 °C) 94 17 128/97 96 %       Physical Exam  GENERAL: Appears older than stated age, non-toxic appearing, mildly distressed, patient speaking in full sentences  HENT: normocephalic, atraumatic  EYES: no scleral icterus, PERRL  CV: Regular rhythm, regular rate, no murmur  RESPIRATORY: normal effort, CTAB  ABDOMEN: soft, nontender  MUSCULOSKELETAL: no deformity  Tenderness to the left calf, with mild swelling  NEURO: alert, moves all extremities, follows commands, mental status normal/baseline  SKIN: warm, dry, no rash   Psych: Flat affect, keeps his eyes closed during exam  Nursing notes and vital signs reviewed      LAB RESULTS  Recent Results (from the past 24 hour(s))   ECG 12 Lead    Collection Time: 11/19/20  5:16 AM   Result Value Ref Range    QT Interval 378 ms   Comprehensive Metabolic Panel    Collection Time: 11/19/20  5:26 AM    Specimen: Blood   Result Value Ref Range    Glucose 99 65 - 99 mg/dL    BUN 14 6 - 20 mg/dL    Creatinine 0.96 0.76 - 1.27 mg/dL    Sodium 138 136 - 145 mmol/L    Potassium 4.6 3.5 - 5.2 mmol/L    Chloride 101 98 - 107 mmol/L    CO2 29.5 (H) 22.0 - 29.0 mmol/L    Calcium 8.8 8.6 - 10.5 mg/dL    Total Protein 6.9 6.0 - 8.5 g/dL    Albumin 3.70 3.50 - 5.20 g/dL    ALT (SGPT) 15 1 - 41 U/L    AST (SGOT) 19 1 - 40 U/L    Alkaline Phosphatase 88 39 - 117 U/L    Total Bilirubin 0.3 0.0 - 1.2 mg/dL    eGFR Non African Amer 83 >60 mL/min/1.73    Globulin 3.2 gm/dL    A/G Ratio 1.2 g/dL    BUN/Creatinine Ratio 14.6 7.0 - 25.0    Anion Gap 7.5 5.0 - 15.0 mmol/L   Protime-INR    Collection Time: 11/19/20  5:26 AM    Specimen: Blood   Result Value Ref Range    Protime 20.4 (H) 11.7 - 14.2 Seconds    INR 1.77 (H) 0.90 - 1.10   Troponin    Collection Time: 11/19/20  5:26 AM    Specimen: Blood   Result Value Ref Range    Troponin T 0.016 0.000 - 0.030 ng/mL   BNP    Collection Time: 11/19/20  5:26 AM    Specimen: Blood   Result Value Ref  Range    proBNP 1,606.0 (H) 0.0 - 900.0 pg/mL   Magnesium    Collection Time: 11/19/20  5:26 AM    Specimen: Blood   Result Value Ref Range    Magnesium 1.9 1.6 - 2.6 mg/dL   CBC Auto Differential    Collection Time: 11/19/20  5:26 AM    Specimen: Blood   Result Value Ref Range    WBC 9.76 3.40 - 10.80 10*3/mm3    RBC 4.57 4.14 - 5.80 10*6/mm3    Hemoglobin 13.6 13.0 - 17.7 g/dL    Hematocrit 41.0 37.5 - 51.0 %    MCV 89.7 79.0 - 97.0 fL    MCH 29.8 26.6 - 33.0 pg    MCHC 33.2 31.5 - 35.7 g/dL    RDW 12.3 12.3 - 15.4 %    RDW-SD 39.7 37.0 - 54.0 fl    MPV 10.0 6.0 - 12.0 fL    Platelets 211 140 - 450 10*3/mm3    Neutrophil % 57.4 42.7 - 76.0 %    Lymphocyte % 26.5 19.6 - 45.3 %    Monocyte % 12.5 (H) 5.0 - 12.0 %    Eosinophil % 2.5 0.3 - 6.2 %    Basophil % 0.8 0.0 - 1.5 %    Immature Grans % 0.3 0.0 - 0.5 %    Neutrophils, Absolute 5.60 1.70 - 7.00 10*3/mm3    Lymphocytes, Absolute 2.59 0.70 - 3.10 10*3/mm3    Monocytes, Absolute 1.22 (H) 0.10 - 0.90 10*3/mm3    Eosinophils, Absolute 0.24 0.00 - 0.40 10*3/mm3    Basophils, Absolute 0.08 0.00 - 0.20 10*3/mm3    Immature Grans, Absolute 0.03 0.00 - 0.05 10*3/mm3    nRBC 0.0 0.0 - 0.2 /100 WBC   Urine Drug Screen - Urine, Clean Catch    Collection Time: 11/19/20  7:40 AM    Specimen: Urine, Clean Catch   Result Value Ref Range    Amphet/Methamphet, Screen Positive (A) Negative    Barbiturates Screen, Urine Negative Negative    Benzodiazepine Screen, Urine Negative Negative    Cocaine Screen, Urine Negative Negative    Opiate Screen Negative Negative    THC, Screen, Urine Negative Negative    Methadone Screen, Urine Negative Negative    Oxycodone Screen, Urine Negative Negative       Ordered the above labs and independently reviewed the results.      RADIOLOGY  Xr Chest 1 View    Result Date: 11/19/2020  SINGLE VIEW OF THE CHEST  HISTORY: Shortness of air  COMPARISON: 11/15/2020  FINDINGS: Cardiomegaly is present. There is vascular congestion. This is more apparent  than on the prior exam. No pneumothorax is identified. Bilateral pleural effusions are suspected.      Cardiomegaly with vascular congestion.  This report was finalized on 11/19/2020 5:53 AM by Dr. Candie Chang M.D.        I ordered the above noted radiological studies and viewed the images on the PACS system.       EKG  Independently viewed by me and interpreted by Dr Wolfe         MEDICAL RECORD REVIEW  Medical records reviewed in [facility], patient was discharged November 17, 2020 after being admitted for systolic CHF, essential hypertension, substance abuse.  Hospital Course:   Colten Hager presented to [facility] with complaints of orthopnea. Please see the admitting history and physical for further details. He was found to have acute CHF with positive D dimer and was admitted to the hospital for further evaluation and treatment. Cardiology was consulted. Initially troponins were elevated and echocardiogram revealed an EF of 25% with multiple wall motion abnormalities and mildly elevated right heart pressures.  He underwent cardiac catheterization that showed significant cardiomyopathy with an EF of 10% and normal coronaries.  His dilated cardiomyopathy was thought to be due to methamphetamine abuse.     He was initially treated with IV diuretics with good urinary output and was transitioned to oral Lasix and Aldactone at time of discharge.  He was also started on carvedilol and lisinopril with stable BP.   He will need to follow-up with cardiology in 1 to 2 weeks with repeat BMP.  Education was provided in depth to patient and family regarding risk of persistent methamphetamine use and importance of tobacco cessation.  He is at increased risk for life alternating irregular heartbeats and reviewed the importance of taking medications as prescribed.  No defibrillator warranted per cardiology at this time, but will need repeat echocardiogram in 1 to 3 months.     CTA of the chest showed  no PE, but noted bilateral effusions and changes suggestive of right-sided heart failure.  Venous Doppler showed left peroneal DVT and was started on Xarelto with plans to continue for at least 6 weeks and consider repeat Doppler at that time to decide about discontinuing anticoagulation. During admission he was complaining of leg cramps that did improve with 1 dose of Valium. Pain was felt to be related to DVT and muscle cramps from diuretics.     Patient was requesting Valium or Xanax to help with anxiety at time of discharge, but discussed that this is not the best treatment and especially with his prior drug abuse and did not feel this was a safe plan.  We discussed possibly adding an antidepressant, but patient declined at this time.  He was encouraged to follow-up evaluated by the access team and given some referral information and was encouraged to follow-up with PCP to discuss anxiety treatment moving further.  Overall he is stable and requesting to go home and has been cleared for discharge by cardiology.  PROCEDURES    Procedures        DIFFERENTIAL DIAGNOSIS  Differential Diagnosis for Dyspnea include but are not limited to the following:    -Pneumonia  -PE  - Acute Respiratory Distress  - Pneumothorax  - CHF  - MI  - Anemia  - Pleural Effusion            PROGRESS, DATA ANALYSIS, CONSULTS, AND MEDICAL DECISION MAKING        ED Course as of Nov 19 0852   Thu Nov 19, 2020   0642 Discussed pertinent information from history and physical exam with patient.  Discussed differential diagnosis and plan for ED evaluation/work-up and treatment including labs, EKG, chest x-ray and IV diuretics.  All questions answered.  Patient is agreeable with this plan.        [MS]   6639 Reviewed pt's history and workup with Dr. Wolfe.  After a bedside evaluation, they agree with the plan of care.          [MS]   1361 Patient was able to ambulate in the ER without difficulty or shortness of breath.  His O2 sats maintained  between 94 and 96%.    [MS]   0809 Discussed with Dr Ward regarding patient's relevant history, exam, workup and ED findings/concerns.  Dr Ward states that we can increase his Lasix to 40 mg BId and patient should follow up in office as scheduled.        [MS]   0850 Discussed with patient and wife the unremarkable work-up done here today.  Did discuss that he will need to increase his Lasix from 40 mg once a day to 40 mg twice a day.  Discussed with patient that he does need to stop smoking and using methamphetamine.  Patient is stable for discharge and should follow-up with his PMD and cardiologist.    [MS]      ED Course User Index  [MS] Lizbeth Yousif, DIANA     Discussed plan for discharge, as there is no emergent indication for admission. Pt/family is agreeable and understands need for follow up and repeat testing.  Pt is aware that discharge does not mean that nothing is wrong but it indicates no emergency is present that requires admission and they must continue care with follow-up as given below or physician of their choice.   Patient/Family voiced understanding of above instructions.  Patient discharged in stable condition.    DIAGNOSIS  Final diagnoses:   Acute on chronic congestive heart failure, unspecified heart failure type (CMS/[facility])   Shortness of breath       FOLLOW UP   PATIENT LIAISON [place] [address]  [phone]        Michael Ward MD  [address]  [place] [address]  [phone]    Schedule an appointment as soon as possible for a visit in 1 week        RX     Medication List      Changed    furosemide 40 MG tablet  Commonly known as: LASIX  Take 1 tablet by mouth 2 (Two) Times a Day.  What changed: when to take this           Where to Get Your Medications      You can get these medications from any pharmacy    Bring a paper prescription for each of these medications  · furosemide 40 MG tablet             MEDICATIONS GIVEN IN  ED    Medications   sodium chloride 0.9 % flush 10 mL (has no administration in time range)   furosemide (LASIX) injection 40 mg (40 mg Intravenous Given 11/19/20 0598)           COURSE & MEDICAL DECISION MAKING  Any/All labs and Any/All Imaging studies that were ordered were reviewed and are noted above.  Results were reviewed/discussed with the patient and they were also made aware of online assess.   Pt also made aware that some labs, such as cultures, will not be resulted during ER visit and follow up with PMD is necessary.        Lizbeth Yousif, APRN  11/19/20 0921

## 2020-11-19 NOTE — OUTREACH NOTE
CHF Week 1 Survey      Responses   [facility] patient discharged from?  [place]   Does the patient have one of the following disease processes/diagnoses(primary or secondary)?  CHF   CHF Week 1 attempt successful?  No [Patient was in ED earlier today. Attempted all numbers listed and no answer. ]   Unsuccessful attempts  Attempt 1          Ulysses Ramos RN

## 2020-11-19 NOTE — DISCHARGE INSTRUCTIONS
You have been given an emergency department evaluation.  This evaluation is intended to rule out life-threatening conditions.  You could require further testing as determined by your primary care physician or any referred specialist.  Take your prescribed medications  Stop smoking  Follow-up with your primary care physician and Cardiologist as instructed when discharged from the hospital.  Please have your blood pressure checked by your primary care provider.  Return to the emergency department if you experience chest pain, shortness of breath, abdominal pain, fever greater than 102, intractable vomiting, or any new or worsening symptoms.

## 2020-11-19 NOTE — ED PROVIDER NOTES
Pt presents to the ED complaining of shortness of breath.  The patient was just admitted to the hospital this week with a cardiac catheterization and found to have a cardiomyopathy and decreased ejection fraction.  He was also found to have a calf vein thrombosis.  The patient states he is taking his anticoagulation and his diuretics but when he lays down at night he becomes short of breath and thus returns to the emergency room.  He denies chest pain, fevers, chills, cough or known COVID-19 exposure    On exam, pt is A&Ox3. NAD  PERRL, moist mucous membranes.  Normocephalic and atraumatic  Heart is RRR. Lungs have mild rales in bases  Abd is soft, non tender, non distended, bowel sounds positive.   1+ pedal edema.  No calf tenderness.  Normal neuro exam      I agree with midlevel plan to check EKG, labs and chest x-ray and consult with cardiology    PPE  Pt does not present with symptoms for COVID19; however, I was wearing a mask and goggles throughout all patient interaction.    The patient's chest x-ray shows questionably worse CHF with mild pleural effusions but the patient's BNP has decreased.  The patient's vital signs are stable.  We have discussed the case with Dr. Hazel from cardiology and we will increase the patient's Lasix and have him follow-up as an outpatient.    The VALENTINO and I have discussed this patient's history, physical exam, and treatment plan.  I have reviewed the documentation and personally had a face to face interaction with the patient. I affirm the documentation and agree with the treatment and plan.  The attached note describes my personal findings.           Xander Wolfe MD  11/19/20 9336

## 2020-11-24 ENCOUNTER — READMISSION MANAGEMENT (OUTPATIENT)
Dept: CALL CENTER | Facility: HOSPITAL | Age: 50
End: 2020-11-24

## 2020-11-24 NOTE — OUTREACH NOTE
CHF Week 1 Survey      Responses   [facility] patient discharged from?  [place]   Does the patient have one of the following disease processes/diagnoses(primary or secondary)?  CHF   CHF Week 1 attempt successful?  Yes   Call start time  1428   Call end time  1438   Discharge diagnosis  CHF   Is patient permission given to speak with other caregiver?  Yes   List who call center can speak with  marina Stanleys reviewed with patient/caregiver?  Yes   Is the patient having any side effects they believe may be caused by any medication additions or changes?  Yes   Side effects comments   Dry cough, takes Lisinopril. Advised to call MD.   Does the patient have all medications ordered at discharge?  Yes   Is the patient taking all medications as directed (includes completed medication regime)?  Yes   Does the patient have a primary care provider?   Yes   Does the patient have an appointment with their PCP within 7 days of discharge?  Yes   Has the patient kept scheduled appointments due by today?  Yes   Comments  Alice Almazan PCP   Pulse Ox monitoring  None   Psychosocial issues?  No   Did the patient receive a copy of their discharge instructions?  Yes   Nursing interventions  Reviewed instructions with patient   What is the patient's perception of their health status since discharge?  Improving   Nursing interventions  Nurse provided patient education   Is the patient weighing daily?  No   Does the patient have scales?  No   Is the patient able to teach back Heart Failure diet management?  Yes   Is the patient able to teach back Heart Failure Zones?  Yes   Is the patient able to teach back signs and symptoms of worsening condition? (i.e. weight gain, shortness of air, etc.)  Yes   Is the patient/caregiver able to teach back the hierarchy of who to call/visit for symptoms/problems? PCP, Specialist, Home health nurse, Urgent Care, ED, 911  Yes    CHF Week 1 call completed?  Yes   Wrap up additional comments   Complimentary of care received at [facility]          Fide Murray RN

## 2020-12-02 PROBLEM — I50.22 CHRONIC SYSTOLIC CONGESTIVE HEART FAILURE: Status: ACTIVE | Noted: 2020-11-15

## 2020-12-02 PROBLEM — I50.21 SYSTOLIC CHF, ACUTE: Status: RESOLVED | Noted: 2020-11-15 | Resolved: 2020-12-02

## 2020-12-03 ENCOUNTER — OFFICE VISIT (OUTPATIENT)
Dept: CARDIOLOGY | Facility: CLINIC | Age: 50
End: 2020-12-03

## 2020-12-03 VITALS
HEIGHT: 69 IN | BODY MASS INDEX: 28.26 KG/M2 | HEART RATE: 85 BPM | WEIGHT: 190.8 LBS | DIASTOLIC BLOOD PRESSURE: 78 MMHG | SYSTOLIC BLOOD PRESSURE: 100 MMHG

## 2020-12-03 DIAGNOSIS — I50.22 CHRONIC SYSTOLIC CONGESTIVE HEART FAILURE (HCC): Primary | ICD-10-CM

## 2020-12-03 DIAGNOSIS — I10 ESSENTIAL HYPERTENSION: ICD-10-CM

## 2020-12-03 DIAGNOSIS — I82.462 ACUTE DEEP VEIN THROMBOSIS (DVT) OF CALF MUSCLE VEIN OF LEFT LOWER EXTREMITY (HCC): ICD-10-CM

## 2020-12-03 PROCEDURE — 93000 ELECTROCARDIOGRAM COMPLETE: CPT | Performed by: INTERNAL MEDICINE

## 2020-12-03 PROCEDURE — 99214 OFFICE O/P EST MOD 30 MIN: CPT | Performed by: INTERNAL MEDICINE

## 2020-12-03 RX ORDER — LOSARTAN POTASSIUM 25 MG/1
25 TABLET ORAL DAILY
Qty: 90 TABLET | Refills: 3 | Status: SHIPPED | OUTPATIENT
Start: 2020-12-03 | End: 2021-12-10

## 2020-12-03 NOTE — PROGRESS NOTES
"[facility] Follow Up Office Note     Encounter Date:20  Patient:Colten Hager  :1970  MRN:[id number]      Chief Complaint: Follow-up congestive heart failure  Chief Complaint   Patient presents with   • Hospital Follow-up     History of Presenting Illness:      Mr. Hager is a 49 y.o. gentleman with past medical history notable for nonischemic cardiomyopathy, hypertension, DVT, and methamphetamine use who presents to our office for scheduled follow up.  He was first seen in the hospital last month for acute systolic heart failure exacerbation and was advised to stop drug use and was start on optimal medical therapy for his CHF.  He did represent to the ED a day after discharge with conitnued SOB and his lasix was increased.  Since then he has had a dry cough.  He also continues to have generalized chest pains and shortness of breath.  Unfortunately he also continues to \"dabble\" in meth.  Overall he seems to be tolerating his medical regimen reasonably well.  He does mention some erectile dysfunction and having difficulty maintaining an erection which could be related to carvedilol or could simply be related to the fact that he has a severe non-ischemic cardiomyopathy.  He did establish with a primary care physician who also started him on an SSRI      Review of Systems:  Review of Systems   Constitution: Positive for malaise/fatigue.   HENT: Negative.    Eyes: Negative.    Cardiovascular: Positive for chest pain and dyspnea on exertion.   Respiratory: Positive for cough and shortness of breath.    Endocrine: Negative.    Hematologic/Lymphatic: Negative.    Skin: Negative.    Musculoskeletal: Negative.    Gastrointestinal: Negative.    Genitourinary: Positive for decreased libido.   Neurological: Negative.    Psychiatric/Behavioral: Negative.    Allergic/Immunologic: Negative.        Prior to Admission medications    Medication Sig Start Date End Date Taking? Authorizing Provider   carvedilol " (COREG) 3.125 MG tablet Take 1 tablet by mouth 2 (Two) Times a Day With Meals. 11/17/20   Bessie June APRN   furosemide (LASIX) 40 MG tablet Take 1 tablet by mouth 2 (Two) Times a Day. 11/19/20   Lizbeth Yousif APRN   lisinopril (PRINIVIL,ZESTRIL) 10 MG tablet Take 1 tablet by mouth Daily. 11/18/20   Bessie June APRN   polyethylene glycol (MIRALAX) 17 GM/SCOOP powder Take 17 g by mouth Daily. 5/18/20   Reginaldo Krishnan MD   Rivaroxaban (Xarelto Starter Pack) tablet therapy pack starter pack Take one 15 mg tablet twice daily with food for 21 days.  Followed by one 20 mg tablet by mouth once daily with food. Take as directed 11/17/20   Bessie June APRN   spironolactone (ALDACTONE) 25 MG tablet Take 1 tablet by mouth Daily. 11/17/20   Bessie June APRN   sucralfate (CARAFATE) 1 g tablet Take 1 tablet by mouth 4 (Four) Times a Day. 5/19/20   Estela Morejon MD       No Known Allergies    Past Medical History:   Diagnosis Date   • Cellulitis    • CHF (congestive heart failure) (CMS/[facility])    • GERD (gastroesophageal reflux disease)    • Hyperlipidemia    • Hypertension        Past Surgical History:   Procedure Laterality Date   • CARDIAC CATHETERIZATION N/A 11/16/2020    Procedure: Left Heart Cath;  Surgeon: Fletcher Mcmanus MD;  Location: [facility] INVAS[facility] LOCATION;  Service: Cardiovascular;  Laterality: N/A;   • CARDIAC CATHETERIZATION N/A 11/16/2020    Procedure: Coronary angiography;  Surgeon: Fletcher Mcmanus MD;  Location: [facility] CATH INVASIVE LOCATION;  Service: Cardiovascular;  Laterality: N/A;   • CARDIAC CATHETERIZATION N/A 11/16/2020    Procedure: Left ventriculography;  Surgeon: Fletcher Mcmanus MD;  Location: [facility] CATH INVASIVE LOCATION;  Service: Cardiovascular;  Laterality: N/A;       Social History     Socioeconomic History   • Marital status:      Spouse name: Not on file   • Number of children: Not on file   • Years of education: Not on file   • Highest  "education level: Not on file   Tobacco Use   • Smoking status: Current Every Day Smoker     Packs/day: 1.00   • Smokeless tobacco: Never Used   Substance and Sexual Activity   • Alcohol use: No   • Drug use: Yes     Types: Methamphetamines     Comment: last meth use 11/12/2020   • Sexual activity: Defer       History reviewed. No pertinent family history.    The following portions of the patient's history were reviewed and updated as appropriate: allergies, current medications, past family history, past medical history, past social history, past surgical history and problem list.       Objective:       Vitals:    12/03/20 1332   BP: 100/78   BP Location: Right arm   Patient Position: Sitting   Pulse: 85   Weight: 86.5 kg (190 lb 12.8 oz)   Height: 175.3 cm (69\")         Physical Exam:  Constitutional: Well appearing, well developed, no acute distress   HENT: Oropharynx clear and membrane moist  Eyes: Normal conjunctiva, no sclera icterus.  Neck: Supple, no carotid bruit bilaterally.  Cardiovascular: Regular rate and rhythm, no murmur, trace lower extremity edema.  Pulmonary: Normal respiratory effort, no lung sounds, no wheezing.  Abdominal: Soft, nontender, no hepatosplenomegaly, liver is non-pulsatile.  Neurological: Alert and orient x 3.   Skin: Warm, dry, no ecchymosis, no rash.  Psych: Appropriate mood and affect. Normal judgment and insight.       Lab Results   Component Value Date    GLUCOSE 99 11/19/2020    BUN 14 11/19/2020    CREATININE 0.96 11/19/2020    EGFRIFNONA 83 11/19/2020    BCR 14.6 11/19/2020    K 4.6 11/19/2020    CO2 29.5 (H) 11/19/2020    CALCIUM 8.8 11/19/2020    ALBUMIN 3.70 11/19/2020    LABIL2 1.2 03/22/2019    AST 19 11/19/2020    ALT 15 11/19/2020       Lab Results   Component Value Date    WBC 9.76 11/19/2020    HGB 13.6 11/19/2020    HCT 41.0 11/19/2020    MCV 89.7 11/19/2020     11/19/2020       Lab Results   Component Value Date    CKTOTAL 110 11/15/2020    TROPONINI 0.044 (H) " 11/14/2020    TROPONINT 0.016 11/19/2020       Lab Results   Component Value Date    PROBNP 1,606.0 (H) 11/19/2020       Lab Results   Component Value Date    CHOL 119 11/16/2020     Lab Results   Component Value Date    TRIG 60 11/16/2020     Lab Results   Component Value Date    HDL 32 (L) 11/16/2020     Lab Results   Component Value Date    LDL 74 11/16/2020       Lab Results   Component Value Date    TSH 2.090 11/15/2020           ECG 12 Lead    Date/Time: 12/3/2020 2:03 PM  Performed by: Michael Ward MD  Authorized by: Michael Ward MD   Comparison: compared with previous ECG from 11/19/2020  Similar to previous ECG  Rhythm: sinus rhythm  Other findings: left ventricular hypertrophy with strain    Clinical impression: abnormal EKG        Venous Duplex 11/17/2020:  · Acute left lower extremity deep vein thrombosis noted in the peroneal.  · All other veins appeared normal bilaterally.    Cardiac Catheterization 11/16/2020:  · Normal coronary arteries  · LVEDP 24 mmHg  · LVEF 20%    Echocardiogram 11/15/2020:  · Calculated left ventricular EF = 25% Estimated left ventricular EF was in agreement with the calculated left ventricular EF. Left ventricular systolic function is severely decreased.  · The left ventricular cavity is moderate to severely dilated.  · The following left ventricular wall segments are hypokinetic: mid anterior, apical anterior, basal anterolateral, mid anterolateral, apical lateral, basal inferolateral, mid inferolateral, apical inferior, mid inferior, apical septal, basal inferoseptal, mid inferoseptal, apex hypokinetic, mid anteroseptal, basal anterior, basal inferior and basal inferoseptal.  · Left ventricular diastolic function is consistent with (grade III w/high LAP) reversible restrictive pattern.  · The right atrial cavity is moderately dilated.  · Left atrial volume is moderately increased.  · Mild aortic valve regurgitation is present.  · Mild aortic valve regurgitation is  present.  · Mild to moderate tricuspid valve regurgitation is present.  · Estimated right ventricular systolic pressure from tricuspid regurgitation is mildly elevated (35-45 mmHg).       Assessment:          Diagnosis Plan   1. Chronic systolic congestive heart failure (CMS/HCC)     2. Essential hypertension     3. Acute deep vein thrombosis (DVT) of calf muscle vein of left lower extremity (CMS/HCC)            Plan:       Mr. Hager is a 49 y.o. gentleman with past medical history notable for nonischemic cardiomyopathy, hypertension, DVT, and methamphetamine use who presents to our office for scheduled follow up.  Unfortunately continues to use methamphetamine.  I did explain to him again that this is critical to try and abstain from as this is likely the major contributor to his nonischemic cardiomyopathy.  Obviously we cannot exclude any sort of genetic component to things but in light of his ongoing drug use this still will limit our ability to get any significant left ventricular recovery.  This would also prevent us from considering primary prevention ICD therapy which he would qualify for if he does not get any significant recovery of his LV function but would be unsafe in light of ongoing drug use.  I would like to change his lisinopril to losartan.  But continue with his current dose of diuretic but have ordered a repeat lab work to follow his creatinine and electrolytes.  Otherwise no changes needed we will see back in 3 months to decide if repeat echocardiogram is needed based upon if he has made any significant advances in his drug recovery      Chronic systolic heart failure:  · Likely related to drug use   · Continue Coreg  · Will transition from lisinopril to losartan  · Continue spirolactone  · Continue lasix    Hypertension:  · Continue with current therapy    DVT:  · Continue anticoagulation    Tobacco Abuse:  · Not interested in quitting    Methamphetamine use:  · Advised to quit     Follow up:  3  Months    Michael aWrd MD  [facility]  12/03/20  13:53 EST

## 2021-06-23 ENCOUNTER — HOSPITAL ENCOUNTER (EMERGENCY)
Facility: HOSPITAL | Age: 51
Discharge: LEFT BEFORE DISCHARGE | End: 2021-06-23
Attending: EMERGENCY MEDICINE | Admitting: EMERGENCY MEDICINE

## 2021-06-23 VITALS
OXYGEN SATURATION: 94 % | HEART RATE: 114 BPM | RESPIRATION RATE: 16 BRPM | TEMPERATURE: 98.1 F | DIASTOLIC BLOOD PRESSURE: 80 MMHG | HEIGHT: 68 IN | SYSTOLIC BLOOD PRESSURE: 140 MMHG | BODY MASS INDEX: 29.01 KG/M2

## 2021-06-23 DIAGNOSIS — M79.605 PAIN OF LEFT LOWER EXTREMITY: Primary | ICD-10-CM

## 2021-06-23 LAB
ALBUMIN SERPL-MCNC: 3.9 G/DL (ref 3.5–5.2)
ALBUMIN/GLOB SERPL: 1.3 G/DL
ALP SERPL-CCNC: 71 U/L (ref 39–117)
ALT SERPL W P-5'-P-CCNC: 12 U/L (ref 1–41)
ANION GAP SERPL CALCULATED.3IONS-SCNC: 6.6 MMOL/L (ref 5–15)
AST SERPL-CCNC: 13 U/L (ref 1–40)
BASOPHILS # BLD AUTO: 0.05 10*3/MM3 (ref 0–0.2)
BASOPHILS NFR BLD AUTO: 0.7 % (ref 0–1.5)
BILIRUB SERPL-MCNC: 0.2 MG/DL (ref 0–1.2)
BUN SERPL-MCNC: 22 MG/DL (ref 6–20)
BUN/CREAT SERPL: 20.6 (ref 7–25)
CALCIUM SPEC-SCNC: 9.3 MG/DL (ref 8.6–10.5)
CHLORIDE SERPL-SCNC: 104 MMOL/L (ref 98–107)
CO2 SERPL-SCNC: 30.4 MMOL/L (ref 22–29)
CREAT SERPL-MCNC: 1.07 MG/DL (ref 0.76–1.27)
D DIMER PPP FEU-MCNC: 0.34 MCGFEU/ML (ref 0–0.49)
DEPRECATED RDW RBC AUTO: 42.6 FL (ref 37–54)
EOSINOPHIL # BLD AUTO: 0.08 10*3/MM3 (ref 0–0.4)
EOSINOPHIL NFR BLD AUTO: 1.1 % (ref 0.3–6.2)
ERYTHROCYTE [DISTWIDTH] IN BLOOD BY AUTOMATED COUNT: 12.6 % (ref 12.3–15.4)
GFR SERPL CREATININE-BSD FRML MDRD: 73 ML/MIN/1.73
GLOBULIN UR ELPH-MCNC: 3.1 GM/DL
GLUCOSE SERPL-MCNC: 140 MG/DL (ref 65–99)
HCT VFR BLD AUTO: 39.3 % (ref 37.5–51)
HGB BLD-MCNC: 13.3 G/DL (ref 13–17.7)
IMM GRANULOCYTES # BLD AUTO: 0.02 10*3/MM3 (ref 0–0.05)
IMM GRANULOCYTES NFR BLD AUTO: 0.3 % (ref 0–0.5)
LYMPHOCYTES # BLD AUTO: 2.04 10*3/MM3 (ref 0.7–3.1)
LYMPHOCYTES NFR BLD AUTO: 26.8 % (ref 19.6–45.3)
MCH RBC QN AUTO: 31.2 PG (ref 26.6–33)
MCHC RBC AUTO-ENTMCNC: 33.8 G/DL (ref 31.5–35.7)
MCV RBC AUTO: 92.3 FL (ref 79–97)
MONOCYTES # BLD AUTO: 1.02 10*3/MM3 (ref 0.1–0.9)
MONOCYTES NFR BLD AUTO: 13.4 % (ref 5–12)
NEUTROPHILS NFR BLD AUTO: 4.4 10*3/MM3 (ref 1.7–7)
NEUTROPHILS NFR BLD AUTO: 57.7 % (ref 42.7–76)
NRBC BLD AUTO-RTO: 0 /100 WBC (ref 0–0.2)
PLATELET # BLD AUTO: 198 10*3/MM3 (ref 140–450)
PMV BLD AUTO: 9.5 FL (ref 6–12)
POTASSIUM SERPL-SCNC: 3.7 MMOL/L (ref 3.5–5.2)
PROT SERPL-MCNC: 7 G/DL (ref 6–8.5)
RBC # BLD AUTO: 4.26 10*6/MM3 (ref 4.14–5.8)
SODIUM SERPL-SCNC: 141 MMOL/L (ref 136–145)
WBC # BLD AUTO: 7.61 10*3/MM3 (ref 3.4–10.8)

## 2021-06-23 PROCEDURE — 99283 EMERGENCY DEPT VISIT LOW MDM: CPT

## 2021-06-23 PROCEDURE — 85379 FIBRIN DEGRADATION QUANT: CPT | Performed by: EMERGENCY MEDICINE

## 2021-06-23 PROCEDURE — 80053 COMPREHEN METABOLIC PANEL: CPT | Performed by: EMERGENCY MEDICINE

## 2021-06-23 PROCEDURE — 85025 COMPLETE CBC W/AUTO DIFF WBC: CPT | Performed by: EMERGENCY MEDICINE

## 2021-06-23 RX ORDER — SODIUM CHLORIDE 0.9 % (FLUSH) 0.9 %
10 SYRINGE (ML) INJECTION AS NEEDED
Status: DISCONTINUED | OUTPATIENT
Start: 2021-06-23 | End: 2021-06-23 | Stop reason: HOSPADM

## 2021-08-30 ENCOUNTER — OFFICE VISIT (OUTPATIENT)
Dept: CARDIOLOGY | Facility: CLINIC | Age: 51
End: 2021-08-30

## 2021-08-30 VITALS
HEART RATE: 94 BPM | DIASTOLIC BLOOD PRESSURE: 70 MMHG | SYSTOLIC BLOOD PRESSURE: 106 MMHG | BODY MASS INDEX: 28.79 KG/M2 | OXYGEN SATURATION: 97 % | HEIGHT: 68 IN | WEIGHT: 190 LBS

## 2021-08-30 DIAGNOSIS — F15.10 METHAMPHETAMINE ABUSE (HCC): ICD-10-CM

## 2021-08-30 DIAGNOSIS — I50.22 CHRONIC SYSTOLIC CONGESTIVE HEART FAILURE (HCC): ICD-10-CM

## 2021-08-30 DIAGNOSIS — I42.0 DILATED CARDIOMYOPATHY (HCC): Primary | ICD-10-CM

## 2021-08-30 DIAGNOSIS — Z72.0 TOBACCO ABUSE: ICD-10-CM

## 2021-08-30 DIAGNOSIS — E78.5 DYSLIPIDEMIA: ICD-10-CM

## 2021-08-30 DIAGNOSIS — Z86.718 HISTORY OF DVT OF LOWER EXTREMITY: ICD-10-CM

## 2021-08-30 DIAGNOSIS — I10 ESSENTIAL HYPERTENSION: ICD-10-CM

## 2021-08-30 PROCEDURE — 93000 ELECTROCARDIOGRAM COMPLETE: CPT | Performed by: NURSE PRACTITIONER

## 2021-08-30 PROCEDURE — 99214 OFFICE O/P EST MOD 30 MIN: CPT | Performed by: NURSE PRACTITIONER

## 2021-08-30 RX ORDER — FUROSEMIDE 80 MG
80 TABLET ORAL DAILY
Qty: 30 TABLET | Refills: 11 | Status: SHIPPED | OUTPATIENT
Start: 2021-08-30 | End: 2021-11-01 | Stop reason: SDUPTHER

## 2021-08-30 RX ORDER — METOPROLOL SUCCINATE 25 MG/1
25 TABLET, EXTENDED RELEASE ORAL DAILY
Qty: 30 TABLET | Refills: 11 | Status: SHIPPED | OUTPATIENT
Start: 2021-08-30 | End: 2021-08-30

## 2021-08-30 RX ORDER — FUROSEMIDE 80 MG
80 TABLET ORAL DAILY
Qty: 30 TABLET | Refills: 11 | Status: SHIPPED | OUTPATIENT
Start: 2021-08-30 | End: 2021-08-30 | Stop reason: SDUPTHER

## 2021-08-30 RX ORDER — METOPROLOL SUCCINATE 25 MG/1
25 TABLET, EXTENDED RELEASE ORAL DAILY
Qty: 90 TABLET | Refills: 3 | Status: SHIPPED | OUTPATIENT
Start: 2021-08-30 | End: 2022-03-02 | Stop reason: SDUPTHER
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
## 2021-09-01 ENCOUNTER — HOSPITAL ENCOUNTER (EMERGENCY)
Facility: HOSPITAL | Age: 51
Discharge: HOME | End: 2021-09-01
Attending: EMERGENCY MEDICINE | Admitting: EMERGENCY MEDICINE

## 2021-09-01 ENCOUNTER — APPOINTMENT (OUTPATIENT)
Dept: GENERAL RADIOLOGY | Facility: HOSPITAL | Age: 51
End: 2021-09-01

## 2021-09-01 VITALS
HEART RATE: 95 BPM | RESPIRATION RATE: 18 BRPM | SYSTOLIC BLOOD PRESSURE: 151 MMHG | DIASTOLIC BLOOD PRESSURE: 87 MMHG | TEMPERATURE: 97.3 F | OXYGEN SATURATION: 98 %

## 2021-09-01 DIAGNOSIS — R07.2 PRECORDIAL CHEST PAIN: ICD-10-CM

## 2021-09-01 DIAGNOSIS — F19.90 IV DRUG USER: ICD-10-CM

## 2021-09-01 DIAGNOSIS — L02.419 ABSCESS OF ARM: Primary | ICD-10-CM

## 2021-09-01 LAB
ALBUMIN SERPL-MCNC: 3.4 G/DL (ref 3.5–5.2)
ALBUMIN/GLOB SERPL: 0.9 G/DL
ALP SERPL-CCNC: 89 U/L (ref 39–117)
ALT SERPL W P-5'-P-CCNC: 16 U/L (ref 1–41)
ANION GAP SERPL CALCULATED.3IONS-SCNC: 9.4 MMOL/L (ref 5–15)
AST SERPL-CCNC: 20 U/L (ref 1–40)
BASOPHILS # BLD AUTO: 0.06 10*3/MM3 (ref 0–0.2)
BASOPHILS NFR BLD AUTO: 0.8 % (ref 0–1.5)
BILIRUB SERPL-MCNC: 0.2 MG/DL (ref 0–1.2)
BUN SERPL-MCNC: 16 MG/DL (ref 6–20)
BUN/CREAT SERPL: 15.2 (ref 7–25)
CALCIUM SPEC-SCNC: 9.3 MG/DL (ref 8.6–10.5)
CHLORIDE SERPL-SCNC: 104 MMOL/L (ref 98–107)
CO2 SERPL-SCNC: 25.6 MMOL/L (ref 22–29)
CREAT SERPL-MCNC: 1.05 MG/DL (ref 0.76–1.27)
DEPRECATED RDW RBC AUTO: 40.3 FL (ref 37–54)
EOSINOPHIL # BLD AUTO: 0.18 10*3/MM3 (ref 0–0.4)
EOSINOPHIL NFR BLD AUTO: 2.3 % (ref 0.3–6.2)
ERYTHROCYTE [DISTWIDTH] IN BLOOD BY AUTOMATED COUNT: 12.2 % (ref 12.3–15.4)
GFR SERPL CREATININE-BSD FRML MDRD: 74 ML/MIN/1.73
GLOBULIN UR ELPH-MCNC: 3.7 GM/DL
GLUCOSE SERPL-MCNC: 96 MG/DL (ref 65–99)
HCT VFR BLD AUTO: 38.8 % (ref 37.5–51)
HGB BLD-MCNC: 13.2 G/DL (ref 13–17.7)
IMM GRANULOCYTES # BLD AUTO: 0.02 10*3/MM3 (ref 0–0.05)
IMM GRANULOCYTES NFR BLD AUTO: 0.3 % (ref 0–0.5)
LYMPHOCYTES # BLD AUTO: 2.13 10*3/MM3 (ref 0.7–3.1)
LYMPHOCYTES NFR BLD AUTO: 27.3 % (ref 19.6–45.3)
MCH RBC QN AUTO: 31.1 PG (ref 26.6–33)
MCHC RBC AUTO-ENTMCNC: 34 G/DL (ref 31.5–35.7)
MCV RBC AUTO: 91.3 FL (ref 79–97)
MONOCYTES # BLD AUTO: 0.89 10*3/MM3 (ref 0.1–0.9)
MONOCYTES NFR BLD AUTO: 11.4 % (ref 5–12)
NEUTROPHILS NFR BLD AUTO: 4.52 10*3/MM3 (ref 1.7–7)
NEUTROPHILS NFR BLD AUTO: 57.9 % (ref 42.7–76)
NRBC BLD AUTO-RTO: 0 /100 WBC (ref 0–0.2)
NT-PROBNP SERPL-MCNC: 215 PG/ML (ref 0–900)
PLATELET # BLD AUTO: 173 10*3/MM3 (ref 140–450)
PMV BLD AUTO: 9.4 FL (ref 6–12)
POTASSIUM SERPL-SCNC: 4.4 MMOL/L (ref 3.5–5.2)
PROT SERPL-MCNC: 7.1 G/DL (ref 6–8.5)
QT INTERVAL: 416 MS
RBC # BLD AUTO: 4.25 10*6/MM3 (ref 4.14–5.8)
SODIUM SERPL-SCNC: 139 MMOL/L (ref 136–145)
TROPONIN T SERPL-MCNC: <0.01 NG/ML (ref 0–0.03)
WBC # BLD AUTO: 7.8 10*3/MM3 (ref 3.4–10.8)

## 2021-09-01 PROCEDURE — 73090 X-RAY EXAM OF FOREARM: CPT

## 2021-09-01 PROCEDURE — 87147 CULTURE TYPE IMMUNOLOGIC: CPT | Performed by: PHYSICIAN ASSISTANT

## 2021-09-01 PROCEDURE — 83880 ASSAY OF NATRIURETIC PEPTIDE: CPT | Performed by: PHYSICIAN ASSISTANT

## 2021-09-01 PROCEDURE — 90715 TDAP VACCINE 7 YRS/> IM: CPT | Performed by: PHYSICIAN ASSISTANT

## 2021-09-01 PROCEDURE — 87186 SC STD MICRODIL/AGAR DIL: CPT | Performed by: PHYSICIAN ASSISTANT

## 2021-09-01 PROCEDURE — 93010 ELECTROCARDIOGRAM REPORT: CPT | Performed by: INTERNAL MEDICINE

## 2021-09-01 PROCEDURE — 90471 IMMUNIZATION ADMIN: CPT | Performed by: PHYSICIAN ASSISTANT

## 2021-09-01 PROCEDURE — 80053 COMPREHEN METABOLIC PANEL: CPT | Performed by: PHYSICIAN ASSISTANT

## 2021-09-01 PROCEDURE — 99283 EMERGENCY DEPT VISIT LOW MDM: CPT

## 2021-09-01 PROCEDURE — 93005 ELECTROCARDIOGRAM TRACING: CPT | Performed by: PHYSICIAN ASSISTANT

## 2021-09-01 PROCEDURE — 84484 ASSAY OF TROPONIN QUANT: CPT | Performed by: PHYSICIAN ASSISTANT

## 2021-09-01 PROCEDURE — 87070 CULTURE OTHR SPECIMN AEROBIC: CPT | Performed by: PHYSICIAN ASSISTANT

## 2021-09-01 PROCEDURE — 25010000002 TDAP 5-2.5-18.5 LF-MCG/0.5 SUSPENSION: Performed by: PHYSICIAN ASSISTANT

## 2021-09-01 PROCEDURE — 71045 X-RAY EXAM CHEST 1 VIEW: CPT

## 2021-09-01 PROCEDURE — 87205 SMEAR GRAM STAIN: CPT | Performed by: PHYSICIAN ASSISTANT

## 2021-09-01 PROCEDURE — 85025 COMPLETE CBC W/AUTO DIFF WBC: CPT | Performed by: PHYSICIAN ASSISTANT

## 2021-09-01 RX ORDER — SULFAMETHOXAZOLE AND TRIMETHOPRIM 800; 160 MG/1; MG/1
1 TABLET ORAL 2 TIMES DAILY
Qty: 14 TABLET | Refills: 0 | Status: SHIPPED | OUTPATIENT
Start: 2021-09-01 | End: 2021-09-08

## 2021-09-01 RX ORDER — CEPHALEXIN 500 MG/1
500 CAPSULE ORAL 4 TIMES DAILY
Qty: 28 CAPSULE | Refills: 0 | Status: SHIPPED | OUTPATIENT
Start: 2021-09-01 | End: 2021-09-08

## 2021-09-01 RX ORDER — LIDOCAINE HYDROCHLORIDE AND EPINEPHRINE 10; 10 MG/ML; UG/ML
10 INJECTION, SOLUTION INFILTRATION; PERINEURAL ONCE
Status: COMPLETED | OUTPATIENT
Start: 2021-09-01 | End: 2021-09-01

## 2021-09-01 RX ADMIN — TETANUS TOXOID, REDUCED DIPHTHERIA TOXOID AND ACELLULAR PERTUSSIS VACCINE, ADSORBED 0.5 ML: 5; 2.5; 8; 8; 2.5 SUSPENSION INTRAMUSCULAR at 16:11

## 2021-09-01 RX ADMIN — LIDOCAINE HYDROCHLORIDE,EPINEPHRINE BITARTRATE 10 ML: 10; .01 INJECTION, SOLUTION INFILTRATION; PERINEURAL at 16:12

## 2021-09-01 NOTE — ED PROVIDER NOTES
EMERGENCY DEPARTMENT ENCOUNTER    Room Number:  A02/02  Date seen:  9/1/2021  Time seen: 13:52 EDT  PCP: Vianney Almazan MD  Historian: Patient    HPI:  Chief complaint: Abscess  A complete HPI/ROS/PMH/PSH/SH/FH are unobtainable due to:   Context:Colten Hager is a 50 y.o. male, hx of CHF, IV drug use, dilated cardiomyopathy, who presents to the ED with c/o noticing abscess to his right forearm 4 days ago and has been gradually getting worse since then, denies any drainage to the area.  He went to urgent care yesterday advised patient to go to the ER for further evaluation.  He went to [facility] but left without being seen.  Patient has a history of IV drug use and last used yesterday.  In addition, he is complaining of intermittent left-sided chest pain for 2 days.  Reports he has a history of CHF, denies any history of endocarditis.  Denies fever, abdominal pain, dysuria, shortness of breath.    Dr. Ward is patient's cardiologist.    Patient was placed in face mask in first look. Patient was wearing facemask when I entered the room and throughout our encounter. I wore full protective equipment throughout this patient encounter including a face mask, goggles, and gloves. Hand hygiene was performed before donning protective equipment and after removal when leaving the room.      MEDICAL RECORD REVIEW      ALLERGIES  Patient has no known allergies.    PAST MEDICAL HISTORY  Active Ambulatory Problems     Diagnosis Date Noted   • Essential hypertension 11/15/2020   • Substance abuse (CMS/Formerly [facility]) 11/15/2020   • Dyslipidemia 11/15/2020   • GERD without esophagitis 11/15/2020   • Methamphetamine abuse (CMS/[facility]ly [facility]) 11/15/2020   • IV drug abuse (CMS/[facility]) 11/15/2020   • Tobacco abuse 11/15/2020   • Chronic systolic congestive heart failure (CMS/[facility]) 11/15/2020   • Acute deep vein thrombosis (DVT) of calf muscle vein of left lower extremity (CMS/[facility]) 11/17/2020   • Dilated cardiomyopathy (CMS/[facility]) 11/17/2020   •  History of DVT of lower extremity 08/30/2021     Resolved Ambulatory Problems     Diagnosis Date Noted   • Systolic CHF, acute (CMS/Formerly [facility]) 11/15/2020     Past Medical History:   Diagnosis Date   • Cellulitis    • CHF (congestive heart failure) (CMS/[facility]ly [facility])    • GERD (gastroesophageal reflux disease)    • Hyperlipidemia    • Hypertension        PAST SURGICAL HISTORY  Past Surgical History:   Procedure Laterality Date   • CARDIAC CATHETERIZATION N/A 11/16/2020    Procedure: Left Heart Cath;  Surgeon: Fletcher Mcmanus MD;  Location:  [facility] C[facility]ASIVE [facility];  Service: Cardiovascular;  Laterality: N/A;   • CARDIAC CATHETERIZATION N/A 11/16/2020    Procedure: Coronary angiography;  Surgeon: Fletcher Mcmanus MD;  Location:  [facility] C[facility]ASIVE [facility];  Service: Cardiovascular;  Laterality: N/A;   • CARDIAC CATHETERIZATION N/A 11/16/2020    Procedure: Left ventriculography;  Surgeon: Fletcher Mcmanus MD;  Location:  [facility] CATH INVASIVE [facility]ATION;  Service: Cardiovascular;  Laterality: N/A;       FAMILY HISTORY  No family history on file.    SOCIAL HISTORY  Social History     Socioeconomic History   • Marital status:      Spouse name: Not on file   • Number of children: Not on file   • Years of education: Not on file   • Highest education level: Not on file   Tobacco Use   • Smoking status: Current Every Day Smoker     Packs/day: 1.00   • Smokeless tobacco: Never Used   Substance and Sexual Activity   • Alcohol use: No   • Drug use: Yes     Types: Methamphetamines     Comment: last meth use 11/12/2020   • Sexual activity: Defer       REVIEW OF SYSTEMS  Review of Systems    All systems reviewed and negative except for those discussed in HPI.     PHYSICAL EXAM    ED Triage Vitals [09/01/21 1324]   Temp Heart Rate Resp BP SpO2   97.3 °F (36.3 °C) 97 18 150/98 98 %      Temp src Heart Rate Source Patient Position BP Location FiO2 (%)   Tympanic Monitor Standing -- --     Physical Exam    I have reviewed the  triage vital signs and nursing notes.      GENERAL: not distressed  HENT: nares patent  EYES: no scleral icterus  NECK: no ROM limitations  CV: regular rhythm, regular rate  RESPIRATORY: normal effort, clear to auscultation bilaterally  ABDOMEN: soft, nontender  : deferred  MUSCULOSKELETAL: no deformity  NEURO: alert, moves all extremities, follows commands  SKIN: warm, dry;  5 cm x 4 cm indurated abscess to the proximal anterior right forearm, small area of fluctuance in the center, no active drainage noted, nontender; track marks noted to the bilateral upper extremities    LAB RESULTS  Recent Results (from the past 24 hour(s))   Comprehensive Metabolic Panel    Collection Time: 09/01/21  2:34 PM    Specimen: Blood   Result Value Ref Range    Glucose 96 65 - 99 mg/dL    BUN 16 6 - 20 mg/dL    Creatinine 1.05 0.76 - 1.27 mg/dL    Sodium 139 136 - 145 mmol/L    Potassium 4.4 3.5 - 5.2 mmol/L    Chloride 104 98 - 107 mmol/L    CO2 25.6 22.0 - 29.0 mmol/L    Calcium 9.3 8.6 - 10.5 mg/dL    Total Protein 7.1 6.0 - 8.5 g/dL    Albumin 3.40 (L) 3.50 - 5.20 g/dL    ALT (SGPT) 16 1 - 41 U/L    AST (SGOT) 20 1 - 40 U/L    Alkaline Phosphatase 89 39 - 117 U/L    Total Bilirubin 0.2 0.0 - 1.2 mg/dL    eGFR Non African Amer 74 >60 mL/min/1.73    Globulin 3.7 gm/dL    A/G Ratio 0.9 g/dL    BUN/Creatinine Ratio 15.2 7.0 - 25.0    Anion Gap 9.4 5.0 - 15.0 mmol/L   Troponin    Collection Time: 09/01/21  2:34 PM    Specimen: Blood   Result Value Ref Range    Troponin T <0.010 0.000 - 0.030 ng/mL   BNP    Collection Time: 09/01/21  2:34 PM    Specimen: Blood   Result Value Ref Range    proBNP 215.0 0.0 - 900.0 pg/mL   ECG 12 Lead    Collection Time: 09/01/21  3:04 PM   Result Value Ref Range    QT Interval 416 ms   CBC Auto Differential    Collection Time: 09/01/21  3:11 PM    Specimen: Blood   Result Value Ref Range    WBC 7.80 3.40 - 10.80 10*3/mm3    RBC 4.25 4.14 - 5.80 10*6/mm3    Hemoglobin 13.2 13.0 - 17.7 g/dL    Hematocrit  38.8 37.5 - 51.0 %    MCV 91.3 79.0 - 97.0 fL    MCH 31.1 26.6 - 33.0 pg    MCHC 34.0 31.5 - 35.7 g/dL    RDW 12.2 (L) 12.3 - 15.4 %    RDW-SD 40.3 37.0 - 54.0 fl    MPV 9.4 6.0 - 12.0 fL    Platelets 173 140 - 450 10*3/mm3    Neutrophil % 57.9 42.7 - 76.0 %    Lymphocyte % 27.3 19.6 - 45.3 %    Monocyte % 11.4 5.0 - 12.0 %    Eosinophil % 2.3 0.3 - 6.2 %    Basophil % 0.8 0.0 - 1.5 %    Immature Grans % 0.3 0.0 - 0.5 %    Neutrophils, Absolute 4.52 1.70 - 7.00 10*3/mm3    Lymphocytes, Absolute 2.13 0.70 - 3.10 10*3/mm3    Monocytes, Absolute 0.89 0.10 - 0.90 10*3/mm3    Eosinophils, Absolute 0.18 0.00 - 0.40 10*3/mm3    Basophils, Absolute 0.06 0.00 - 0.20 10*3/mm3    Immature Grans, Absolute 0.02 0.00 - 0.05 10*3/mm3    nRBC 0.0 0.0 - 0.2 /100 WBC         RADIOLOGY RESULTS  XR Forearm 2 View Right   Final Result      XR Chest 1 View   Final Result            PROGRESS, DATA ANALYSIS, CONSULTS AND MEDICAL DECISION MAKING  All labs have been independently reviewed by me.  All radiology studies have been reviewed by me and discussed with radiologist dictating the report. Discussion below represents my analysis of pertinent findings related to patient's condition, differential diagnosis, treatment plan and final disposition.          The differential diagnosis include but are not limited to endocarditis, abscess, cellulitis.    Incision & Drainage    Date/Time: 9/1/2021 4:23 PM  Performed by: Yamel Ahuja PA-C  Authorized by: Gerald Middleton MD     Consent:     Consent obtained:  Verbal    Consent given by:  Patient    Risks discussed:  Incomplete drainage and pain  Location:     Type:  Abscess    Size:  5cm x 4 cm    Location:  Upper extremity    Upper extremity location:  Arm    Arm location:  R upper arm  Pre-procedure details:     Skin preparation:  Betadine  Anesthesia (see MAR for exact dosages):     Anesthesia method:  Local infiltration    Local anesthetic:  Lidocaine 1% WITH epi  Procedure type:      Complexity:  Simple  Procedure details:     Incision depth:  Dermal    Scalpel blade:  11    Wound management:  Irrigated with saline    Drainage:  Serosanguinous    Drainage amount:  Moderate    Wound treatment:  Wound left open    Packing materials:  1/4 in gauze  Post-procedure details:     Patient tolerance of procedure:  Tolerated well, no immediate complications         Reviewed pt's history and workup with Dr. Middleton.  After a bedside evaluation, Dr. Middleton agrees with the plan of care.    (FOR DISCHARGE)The patient's history, physical exam, and lab findings were discussed with the physician, who also performed a face to face history and physical exam.  I discussed all results and noted any abnormalities with patient.  Discussed absoute need to recheck abnormalities with their family physician.  I answered any of the patient's questions.  Discussed plan for discharge, as there is no emergent indication for admission.  Pt is agreeable and understands need for follow up and repeat testing.  Pt is aware that discharge does not mean that nothing is wrong but it indicates no emergency is present and they must continue care with their family physician.  Pt is discharged with instructions to follow up with primary care doctor to have their blood pressure rechecked.             Disposition vitals:  /87   Pulse 95   Temp 97.3 °F (36.3 °C) (Tympanic)   Resp 18   SpO2 98%       DIAGNOSIS  Final diagnoses:   Abscess of arm   IV drug user   Precordial chest pain       FOLLOW UP   Vianney Almazan MD  [address] [facility] [address] [phone]    Call in 1 day      [facility] Emergency Department  [address] [facility] [address] [phone]    As needed, If symptoms worsen         Yamel Ahuja PA-C  09/01/21 9796

## 2021-09-01 NOTE — DISCHARGE INSTRUCTIONS
It is very important that you start and complete your antibiotics.  Leave the wound packing in for 48 hours then remove the packing.  It is very important that you call your primary care doctor tomorrow to recheck your wound.

## 2021-09-01 NOTE — ED NOTES
This RN in appropriate ppe while in pt room. Pt wearing mask.        Keisha Blake, RN  09/01/21 4633

## 2021-09-01 NOTE — ED PROVIDER NOTES
MD ATTESTATION NOTE    The VALENTINO and I have discussed this patient's history, physical exam, and treatment plan.  I have reviewed the documentation and personally had a face to face interaction with the patient. I affirm the documentation and agree with the treatment and plan.  The attached note describes my personal findings.      Colten Hager is a 50 y.o. male who presents to the ED c/o an abscess to his right forearm for the last 4 to 5 days.  He reports he uses IV drugs.  He does not know when he last had a tetanus shot.  He has no weakness or numbness.  He denies fevers or chills.  He reports a history of IV drug use, CHF and cardiomyopathy.      On exam:  GENERAL: Awake, alert, no acute distress  SKIN: Warm, dry  HENT: Normocephalic, atraumatic  EYES: no scleral icterus  CV: regular rhythm, regular rate, no murmur  RESPIRATORY: normal effort, lungs clear  ABDOMEN: soft, non-tender, non-distended  MUSCULOSKELETAL: no deformity.  Right forearm with indurated area of erythema with some fluctuance.  Consistent with an abscess.  Distal pulses, motor, sensation intact.  NEURO: alert, moves all extremities, follows commands    Labs  No results found for this or any previous visit (from the past 24 hour(s)).    Radiology  No Radiology Exams Resulted Within Past 24 Hours    Medical Decision Making:       Plan x-ray of the right forearm to rule out foreign body.  Plan to update his tetanus.  Plan I&D.  He told the PA that he has been having intermittent chest pain for the last 2 days as well.  Plan EKG and troponin.  He did not discuss any chest pain episodes with me.    PPE: Both the patient and I wore a surgical mask throughout the entire patient encounter. I wore protective goggles.     The patient qualifies to receive the vaccine, but they have not yet received it.    Diagnosis  Final diagnoses:   None        Gerald Middleton MD  09/01/21 0847

## 2021-09-03 LAB
BACTERIA SPEC AEROBE CULT: ABNORMAL
GRAM STN SPEC: ABNORMAL
GRAM STN SPEC: ABNORMAL

## 2021-10-22 RX ORDER — FUROSEMIDE 40 MG/1
TABLET ORAL
Qty: 60 TABLET | OUTPATIENT
Start: 2021-10-22

## 2021-10-22 RX ORDER — SUCRALFATE 1 G/1
TABLET ORAL
Qty: 120 TABLET | OUTPATIENT
Start: 2021-10-22

## 2021-11-01 RX ORDER — FUROSEMIDE 40 MG/1
TABLET ORAL
Qty: 60 TABLET | Refills: 11 | OUTPATIENT
Start: 2021-11-01

## 2021-11-01 RX ORDER — FUROSEMIDE 80 MG
80 TABLET ORAL DAILY
Qty: 30 TABLET | Refills: 11 | Status: SHIPPED | OUTPATIENT
Start: 2021-11-01 | End: 2022-03-02 | Stop reason: SDUPTHER

## 2021-11-01 RX ORDER — SUCRALFATE 1 G/1
TABLET ORAL
Qty: 120 TABLET | Refills: 11 | OUTPATIENT
Start: 2021-11-01

## 2021-12-10 RX ORDER — LOSARTAN POTASSIUM 25 MG/1
25 TABLET ORAL DAILY
Qty: 90 TABLET | Refills: 3 | Status: SHIPPED | OUTPATIENT
Start: 2021-12-10 | End: 2022-03-02 | Stop reason: SDUPTHER

## 2022-01-01 NOTE — ED TRIAGE NOTES
Pt states that for the last 5 days he has had an abscess on his right arm just below his AC. Pt went to [facility] and was told to come to the ER and have it drained. Patient masked at arrival and triage staff wore all appropriate PPE during entire encounter with patient.      Him/He

## 2022-03-02 ENCOUNTER — OFFICE VISIT (OUTPATIENT)
Dept: CARDIOLOGY | Facility: CLINIC | Age: 52
End: 2022-03-02

## 2022-03-02 VITALS
SYSTOLIC BLOOD PRESSURE: 142 MMHG | DIASTOLIC BLOOD PRESSURE: 98 MMHG | HEART RATE: 70 BPM | WEIGHT: 193 LBS | HEIGHT: 68 IN | BODY MASS INDEX: 29.25 KG/M2

## 2022-03-02 DIAGNOSIS — I42.0 NONISCHEMIC DILATED CARDIOMYOPATHY: ICD-10-CM

## 2022-03-02 DIAGNOSIS — I50.22 CHRONIC SYSTOLIC CONGESTIVE HEART FAILURE: Primary | ICD-10-CM

## 2022-03-02 DIAGNOSIS — I10 ESSENTIAL HYPERTENSION: ICD-10-CM

## 2022-03-02 DIAGNOSIS — F19.10 IV DRUG ABUSE: ICD-10-CM

## 2022-03-02 DIAGNOSIS — F19.10 IV DRUG ABUSE: Primary | ICD-10-CM

## 2022-03-02 DIAGNOSIS — Z72.0 TOBACCO ABUSE: ICD-10-CM

## 2022-03-02 DIAGNOSIS — F15.10 METHAMPHETAMINE ABUSE: ICD-10-CM

## 2022-03-02 PROCEDURE — 93000 ELECTROCARDIOGRAM COMPLETE: CPT | Performed by: NURSE PRACTITIONER

## 2022-03-02 PROCEDURE — 99214 OFFICE O/P EST MOD 30 MIN: CPT | Performed by: NURSE PRACTITIONER

## 2022-03-02 RX ORDER — METOPROLOL SUCCINATE 25 MG/1
25 TABLET, EXTENDED RELEASE ORAL DAILY
Qty: 90 TABLET | Refills: 3 | Status: SHIPPED | OUTPATIENT
Start: 2022-03-02

## 2022-03-02 RX ORDER — LOSARTAN POTASSIUM 25 MG/1
25 TABLET ORAL DAILY
Qty: 90 TABLET | Refills: 3 | Status: SHIPPED | OUTPATIENT
Start: 2022-03-02 | End: 2023-01-13

## 2022-03-02 RX ORDER — FUROSEMIDE 80 MG
80 TABLET ORAL DAILY
Qty: 30 TABLET | Refills: 11 | Status: SHIPPED | OUTPATIENT
Start: 2022-03-02

## 2022-03-02 RX ORDER — SPIRONOLACTONE 25 MG/1
25 TABLET ORAL DAILY
Qty: 30 TABLET | Refills: 0 | Status: SHIPPED | OUTPATIENT
Start: 2022-03-02 | End: 2022-06-20

## 2022-03-02 NOTE — PROGRESS NOTES
"  Date of Office Visit: 2022  Encounter Provider: DIANA Ann  Place of Service: [facility] CARDIOLOGY  Patient Name: Colten Hager  :1970    Chief Complaint   Patient presents with   • Shortness of Breath     6 month f/u   :     HPI: Colten Hager is a 51 y.o. male who is a patient of  Dr. Ward.  He is new to me today and presents for a 6-month follow-up.  He has history of nonischemic cardiomyopathy, IV drug abuse, hypertension, chronic back pain and DVT.  He was last seen in our office by EFRAIN Blanco in 2021.  Apparently at that time, he was still using methamphetamines.  He was having trouble remembering his twice daily medications.  He was changed to Toprol-XL daily instead of Coreg and Lasix 80 mg daily instead of 40 mg twice daily.    2D echocardiogram in 2020 showed LVEF 25% with moderate to severely dilated LV, grade 3 diastolic dysfunction, moderately dilated RA, mild AI and mild to moderate TR.  Cardiac cath at that time showed normal coronaries.     Today patient presents with no complaints of chest pain, pressure, palpitations or dizziness.  He is not short of breath.  Appears to have abscess on his left forearm and right forearm.  He admits to \"shooting up\" with amphetamines once daily.  He notes that it is getting harder to find veins.  He endorses compliance with his cardiac medications.  EKG is normal sinus rhythm.  Blood pressure appears controlled today.  We had extensive discussion regarding importance of stopping drug use in the condition of his heart due to continued drug use.  Previous testing and notes have been reviewed by me.   Past Medical History:   Diagnosis Date   • Cellulitis    • CHF (congestive heart failure) (CMS/HCC)    • GERD (gastroesophageal reflux disease)    • Hyperlipidemia    • Hypertension        Past Surgical History:   Procedure Laterality Date   • CARDIAC CATHETERIZATION N/A 2020    " Procedure: Left Heart Cath;  Surgeon: Fletcher Mcmanus MD;  Location: [facility] CATH INVASIVE LOCATION;  Service: Cardiovascular;  Laterality: N/A;   • CARDIAC CATHETERIZATION N/A 11/16/2020    Procedure: Coronary angiography;  Surgeon: Fletcher Mcmauns MD;  Location: [facility] CATH INVASIVE LOCATION;  Service: Cardiovascular;  Laterality: N/A;   • CARDIAC CATHETERIZATION N/A 11/16/2020    Procedure: Left ventriculography;  Surgeon: Fletcher Mcmanus MD;  Location: [facility] CATH INVASIVE LOCATION;  Service: Cardiovascular;  Laterality: N/A;       Social History     Socioeconomic History   • Marital status:    Tobacco Use   • Smoking status: Current Every Day Smoker     Packs/day: 1.00   • Smokeless tobacco: Never Used   Substance and Sexual Activity   • Alcohol use: No   • Drug use: Yes     Types: Methamphetamines     Comment: last meth use 11/12/2020   • Sexual activity: Defer       No family history on file.    Review of Systems   Constitutional: Negative.   HENT: Negative.    Eyes: Negative.    Cardiovascular: Positive for leg swelling.   Respiratory: Negative.    Endocrine: Negative.    Hematologic/Lymphatic: Negative.    Skin: Positive for suspicious lesions.        Abscesses on both arms   Musculoskeletal: Negative.    Gastrointestinal: Negative.    Genitourinary: Negative.    Neurological: Negative.    Psychiatric/Behavioral: Positive for substance abuse.   Allergic/Immunologic: Negative.        No Known Allergies      Current Outpatient Medications:   •  furosemide (LASIX) 80 MG tablet, Take 1 tablet by mouth Daily., Disp: 30 tablet, Rfl: 11  •  losartan (COZAAR) 25 MG tablet, Take 1 tablet by mouth Daily., Disp: 90 tablet, Rfl: 3  •  metoprolol succinate XL (TOPROL-XL) 25 MG 24 hr tablet, Take 1 tablet by mouth Daily., Disp: 90 tablet, Rfl: 3  •  polyethylene glycol (MIRALAX) 17 GM/SCOOP powder, Take 17 g by mouth Daily., Disp: 500 g, Rfl: 0  •  sertraline (ZOLOFT) 50 MG tablet, Take 50 mg by  "mouth Daily., Disp: , Rfl:   •  spironolactone (ALDACTONE) 25 MG tablet, Take 1 tablet by mouth Daily., Disp: 30 tablet, Rfl: 0  •  sucralfate (CARAFATE) 1 g tablet, Take 1 tablet by mouth 4 (Four) Times a Day., Disp: 16 tablet, Rfl: 0      Objective:     Vitals:    03/02/22 1351   BP: 142/98   Pulse: 70   Weight: 87.5 kg (193 lb)   Height: 172.7 cm (68\")     Body mass index is 29.35 kg/m².    PHYSICAL EXAM:    Constitutional:       Appearance: Healthy appearance. Not in distress.   Neck:      Vascular: No JVR. JVD normal.   Pulmonary:      Effort: Pulmonary effort is normal.      Breath sounds: Normal breath sounds. No wheezing. No rhonchi. No rales.   Chest:      Chest wall: Not tender to palpatation.   Cardiovascular:      PMI at left midclavicular line. Normal rate. Regular rhythm. Normal S1. Normal S2.      Murmurs: There is no murmur.      No gallop. No click. No rub.   Pulses:     Intact distal pulses.   Edema:     Ankle: trace pitting edema of the left ankle and 1+ pitting edema of the right ankle.     Feet: bilateral trace pitting edema of the feet.  Abdominal:      General: Bowel sounds are normal.      Palpations: Abdomen is soft.      Tenderness: There is no abdominal tenderness.   Musculoskeletal: Normal range of motion.         General: No tenderness. Skin:     General: Skin is warm and dry.      Comments: Abscess bilateral forearms   Neurological:      General: No focal deficit present.      Mental Status: Alert and oriented to person, place and time.           ECG 12 Lead    Date/Time: 3/2/2022 2:29 PM  Performed by: Nikole Taveras APRN  Authorized by: Nikole Taveras APRN   Comparison: compared with previous ECG from 8/30/2021  Similar to previous ECG  Rhythm: sinus rhythm  Rate: normal  BPM: 70  QRS axis: normal    Clinical impression: normal ECG              Assessment:       Diagnosis Plan   1. Chronic systolic congestive heart failure (HCC)  ECG 12 Lead   2. Nonischemic dilated " cardiomyopathy (HCC)  ECG 12 Lead    POC Urine Drug Screen, Triage   3. Methamphetamine abuse (HCC)  POC Urine Drug Screen, Triage   4. IV drug abuse (HCC)  POC Urine Drug Screen, Triage   5. Tobacco abuse     6. Essential hypertension       Orders Placed This Encounter   Procedures   • POC Urine Drug Screen, Triage     Order Specific Question:   Release to patient     Answer:   Immediate   • ECG 12 Lead     This order was created via procedure documentation     Order Specific Question:   Release to patient     Answer:   Immediate          Plan:       1.  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  I had extensive discussion with patient today regarding effects of his continued drug use on his heart.  He appears euvolemic. Endorses compliance with his cardiac medications.  Unable to place ICD device due to patient's continued IV drug abuse and recurrence of infections.  2.  Hypertension:  Well-controlled on current medications  3.  Methamphetamine use: Daily.  Patient states that he uses to give him energy.  I have offered him resources on drug rehabilitation and patient refuses.  States that he will stop himself.  4.  Bilateral arm abscesses related to IV drug abuse  5.  Tobacco abuse  6.  Chronic systolic congestive heart failure: Appears euvolemic.  On appropriate medication    Patient will follow up with Dr. Ward in 6 months.  He has agreed to a urine drug screen prior to this office visit.  He will call sooner for any questions or concerns     Your medication list          Accurate as of March 2, 2022  2:32 PM. If you have any questions, ask your nurse or doctor.            CONTINUE taking these medications      Instructions Last Dose Given Next Dose Due   furosemide 80 MG tablet  Commonly known as: LASIX      Take 1 tablet by mouth Daily.       losartan 25 MG tablet  Commonly known as: COZAAR      Take 1 tablet by mouth Daily.       metoprolol succinate XL 25 MG 24 hr  tablet  Commonly known as: TOPROL-XL      Take 1 tablet by mouth Daily.       polyethylene glycol 17 GM/SCOOP powder  Commonly known as: MIRALAX      Take 17 g by mouth Daily.       sertraline 50 MG tablet  Commonly known as: ZOLOFT      Take 50 mg by mouth Daily.       spironolactone 25 MG tablet  Commonly known as: ALDACTONE      Take 1 tablet by mouth Daily.       sucralfate 1 g tablet  Commonly known as: CARAFATE      Take 1 tablet by mouth 4 (Four) Times a Day.          STOP taking these medications    rivaroxaban 20 MG tablet  Commonly known as: XARELTO              Where to Get Your Medications      These medications were sent to [id number] - [place], [place] - [address] AT [address] [facility] - [phone]  - [phone] [facility] [facility] [address]    Phone: [phone]   · furosemide 80 MG tablet  · losartan 25 MG tablet  · metoprolol succinate XL 25 MG 24 hr tablet  · spironolactone 25 MG tablet           As always, it has been a pleasure to participate in your patient's care.      Sincerely,       DIANA Montanez

## 2022-06-20 DIAGNOSIS — I50.22 CHRONIC SYSTOLIC CONGESTIVE HEART FAILURE: ICD-10-CM

## 2022-06-20 DIAGNOSIS — I42.0 NONISCHEMIC DILATED CARDIOMYOPATHY: ICD-10-CM

## 2022-06-20 RX ORDER — SPIRONOLACTONE 25 MG/1
25 TABLET ORAL DAILY
Qty: 90 TABLET | Refills: 3 | Status: SHIPPED | OUTPATIENT
Start: 2022-06-20

## 2022-06-20 NOTE — TELEPHONE ENCOUNTER
LOV   -   3/2/2022  Next   -   9/15/2022  Last labs   -   5/12/2022  Lipid, CMP   ([facility]) Care Everywhere    Lizbeth PERAZA

## 2022-12-06 DIAGNOSIS — I50.22 CHRONIC SYSTOLIC CONGESTIVE HEART FAILURE: ICD-10-CM

## 2022-12-06 DIAGNOSIS — I42.0 NONISCHEMIC DILATED CARDIOMYOPATHY: ICD-10-CM

## 2022-12-08 RX ORDER — LOSARTAN POTASSIUM 25 MG/1
25 TABLET ORAL DAILY
Qty: 90 TABLET | Refills: 0 | OUTPATIENT
Start: 2022-12-08

## 2023-01-03 ENCOUNTER — HOSPITAL ENCOUNTER (EMERGENCY)
Facility: HOSPITAL | Age: 53
Discharge: HOME | End: 2023-01-04
Attending: EMERGENCY MEDICINE | Admitting: EMERGENCY MEDICINE
Payer: COMMERCIAL

## 2023-01-03 ENCOUNTER — APPOINTMENT (OUTPATIENT)
Dept: GENERAL RADIOLOGY | Facility: HOSPITAL | Age: 53
End: 2023-01-03
Payer: COMMERCIAL

## 2023-01-03 ENCOUNTER — APPOINTMENT (OUTPATIENT)
Dept: CT IMAGING | Facility: HOSPITAL | Age: 53
End: 2023-01-03
Payer: COMMERCIAL

## 2023-01-03 DIAGNOSIS — R50.9 FEVER, UNSPECIFIED FEVER CAUSE: ICD-10-CM

## 2023-01-03 DIAGNOSIS — J10.1 INFLUENZA A: Primary | ICD-10-CM

## 2023-01-03 DIAGNOSIS — M79.10 MYALGIA: ICD-10-CM

## 2023-01-03 LAB
ALBUMIN SERPL-MCNC: 3.8 G/DL (ref 3.5–5.2)
ALBUMIN/GLOB SERPL: 1.1 G/DL
ALP SERPL-CCNC: 87 U/L (ref 39–117)
ALT SERPL W P-5'-P-CCNC: 17 U/L (ref 1–41)
ANION GAP SERPL CALCULATED.3IONS-SCNC: 10.9 MMOL/L (ref 5–15)
ANISOCYTOSIS BLD QL: ABNORMAL
AST SERPL-CCNC: 26 U/L (ref 1–40)
BILIRUB SERPL-MCNC: 0.3 MG/DL (ref 0–1.2)
BILIRUB UR QL STRIP: NEGATIVE
BUN SERPL-MCNC: 16 MG/DL (ref 6–20)
BUN/CREAT SERPL: 13.1 (ref 7–25)
CALCIUM SPEC-SCNC: 8.4 MG/DL (ref 8.6–10.5)
CHLORIDE SERPL-SCNC: 96 MMOL/L (ref 98–107)
CLARITY UR: CLEAR
CO2 SERPL-SCNC: 26.1 MMOL/L (ref 22–29)
COLOR UR: YELLOW
CREAT SERPL-MCNC: 1.22 MG/DL (ref 0.76–1.27)
DEPRECATED RDW RBC AUTO: 39.5 FL (ref 37–54)
EGFRCR SERPLBLD CKD-EPI 2021: 70.9 ML/MIN/1.73
ERYTHROCYTE [DISTWIDTH] IN BLOOD BY AUTOMATED COUNT: 12.3 % (ref 12.3–15.4)
GLOBULIN UR ELPH-MCNC: 3.4 GM/DL
GLUCOSE SERPL-MCNC: 111 MG/DL (ref 65–99)
GLUCOSE UR STRIP-MCNC: NEGATIVE MG/DL
HCT VFR BLD AUTO: 39.6 % (ref 37.5–51)
HGB BLD-MCNC: 13.8 G/DL (ref 13–17.7)
HGB UR QL STRIP.AUTO: NEGATIVE
KETONES UR QL STRIP: NEGATIVE
LEUKOCYTE ESTERASE UR QL STRIP.AUTO: NEGATIVE
LIPASE SERPL-CCNC: 38 U/L (ref 13–60)
LYMPHOCYTES # BLD MANUAL: 0.74 10*3/MM3 (ref 0.7–3.1)
LYMPHOCYTES NFR BLD MANUAL: 12.4 % (ref 5–12)
MCH RBC QN AUTO: 30.3 PG (ref 26.6–33)
MCHC RBC AUTO-ENTMCNC: 34.8 G/DL (ref 31.5–35.7)
MCV RBC AUTO: 87 FL (ref 79–97)
MICROCYTES BLD QL: ABNORMAL
MONOCYTES # BLD: 0.53 10*3/MM3 (ref 0.1–0.9)
NEUTROPHILS # BLD AUTO: 2.97 10*3/MM3 (ref 1.7–7)
NEUTROPHILS NFR BLD MANUAL: 70.1 % (ref 42.7–76)
NITRITE UR QL STRIP: NEGATIVE
PH UR STRIP.AUTO: 6 [PH] (ref 5–8)
PLAT MORPH BLD: NORMAL
PLATELET # BLD AUTO: 112 10*3/MM3 (ref 140–450)
PMV BLD AUTO: 10 FL (ref 6–12)
POTASSIUM SERPL-SCNC: 3.8 MMOL/L (ref 3.5–5.2)
PROT SERPL-MCNC: 7.2 G/DL (ref 6–8.5)
PROT UR QL STRIP: NEGATIVE
RBC # BLD AUTO: 4.55 10*6/MM3 (ref 4.14–5.8)
SODIUM SERPL-SCNC: 133 MMOL/L (ref 136–145)
SP GR UR STRIP: 1.01 (ref 1–1.03)
UROBILINOGEN UR QL STRIP: NORMAL
VARIANT LYMPHS NFR BLD MANUAL: 17.5 % (ref 19.6–45.3)
WBC MORPH BLD: NORMAL
WBC NRBC COR # BLD: 4.24 10*3/MM3 (ref 3.4–10.8)

## 2023-01-03 PROCEDURE — 99284 EMERGENCY DEPT VISIT MOD MDM: CPT

## 2023-01-03 PROCEDURE — 80053 COMPREHEN METABOLIC PANEL: CPT | Performed by: EMERGENCY MEDICINE

## 2023-01-03 PROCEDURE — 74176 CT ABD & PELVIS W/O CONTRAST: CPT

## 2023-01-03 PROCEDURE — 36415 COLL VENOUS BLD VENIPUNCTURE: CPT

## 2023-01-03 PROCEDURE — 25010000002 KETOROLAC TROMETHAMINE PER 15 MG: Performed by: EMERGENCY MEDICINE

## 2023-01-03 PROCEDURE — 85025 COMPLETE CBC W/AUTO DIFF WBC: CPT | Performed by: EMERGENCY MEDICINE

## 2023-01-03 PROCEDURE — 83690 ASSAY OF LIPASE: CPT | Performed by: EMERGENCY MEDICINE

## 2023-01-03 PROCEDURE — 81003 URINALYSIS AUTO W/O SCOPE: CPT | Performed by: EMERGENCY MEDICINE

## 2023-01-03 PROCEDURE — 0202U NFCT DS 22 TRGT SARS-COV-2: CPT | Performed by: EMERGENCY MEDICINE

## 2023-01-03 PROCEDURE — 96374 THER/PROPH/DIAG INJ IV PUSH: CPT

## 2023-01-03 PROCEDURE — 71045 X-RAY EXAM CHEST 1 VIEW: CPT

## 2023-01-03 PROCEDURE — 85007 BL SMEAR W/DIFF WBC COUNT: CPT | Performed by: EMERGENCY MEDICINE

## 2023-01-03 RX ORDER — KETOROLAC TROMETHAMINE 15 MG/ML
15 INJECTION, SOLUTION INTRAMUSCULAR; INTRAVENOUS ONCE
Status: COMPLETED | OUTPATIENT
Start: 2023-01-03 | End: 2023-01-03

## 2023-01-03 RX ORDER — ACETAMINOPHEN 500 MG
1000 TABLET ORAL ONCE
Status: COMPLETED | OUTPATIENT
Start: 2023-01-03 | End: 2023-01-03

## 2023-01-03 RX ORDER — SODIUM CHLORIDE 0.9 % (FLUSH) 0.9 %
10 SYRINGE (ML) INJECTION AS NEEDED
Status: DISCONTINUED | OUTPATIENT
Start: 2023-01-03 | End: 2023-01-04 | Stop reason: HOSPADM

## 2023-01-03 RX ADMIN — SODIUM CHLORIDE 1000 ML: 9 INJECTION, SOLUTION INTRAVENOUS at 22:13

## 2023-01-03 RX ADMIN — KETOROLAC TROMETHAMINE 15 MG: 15 INJECTION, SOLUTION INTRAMUSCULAR; INTRAVENOUS at 22:13

## 2023-01-03 RX ADMIN — ACETAMINOPHEN 1000 MG: 500 TABLET ORAL at 22:14

## 2023-01-04 VITALS
OXYGEN SATURATION: 94 % | TEMPERATURE: 100.8 F | RESPIRATION RATE: 18 BRPM | HEIGHT: 68 IN | HEART RATE: 61 BPM | WEIGHT: 193 LBS | DIASTOLIC BLOOD PRESSURE: 82 MMHG | SYSTOLIC BLOOD PRESSURE: 108 MMHG | BODY MASS INDEX: 29.25 KG/M2

## 2023-01-04 LAB
B PARAPERT DNA SPEC QL NAA+PROBE: NOT DETECTED
B PERT DNA SPEC QL NAA+PROBE: NOT DETECTED
C PNEUM DNA NPH QL NAA+NON-PROBE: NOT DETECTED
FLUAV H1 2009 PAND RNA NPH QL NAA+PROBE: DETECTED
FLUBV RNA ISLT QL NAA+PROBE: NOT DETECTED
HADV DNA SPEC NAA+PROBE: NOT DETECTED
HCOV 229E RNA SPEC QL NAA+PROBE: NOT DETECTED
HCOV HKU1 RNA SPEC QL NAA+PROBE: NOT DETECTED
HCOV NL63 RNA SPEC QL NAA+PROBE: NOT DETECTED
HCOV OC43 RNA SPEC QL NAA+PROBE: NOT DETECTED
HMPV RNA NPH QL NAA+NON-PROBE: NOT DETECTED
HPIV1 RNA ISLT QL NAA+PROBE: NOT DETECTED
HPIV2 RNA SPEC QL NAA+PROBE: NOT DETECTED
HPIV3 RNA NPH QL NAA+PROBE: NOT DETECTED
HPIV4 P GENE NPH QL NAA+PROBE: NOT DETECTED
M PNEUMO IGG SER IA-ACNC: NOT DETECTED
RHINOVIRUS RNA SPEC NAA+PROBE: NOT DETECTED
RSV RNA NPH QL NAA+NON-PROBE: NOT DETECTED
SARS-COV-2 RNA NPH QL NAA+NON-PROBE: NOT DETECTED

## 2023-01-04 RX ORDER — ONDANSETRON 4 MG/1
4 TABLET, ORALLY DISINTEGRATING ORAL EVERY 8 HOURS PRN
Qty: 9 TABLET | Refills: 0 | Status: SHIPPED | OUTPATIENT
Start: 2023-01-04

## 2023-01-04 RX ORDER — NAPROXEN 500 MG/1
500 TABLET ORAL 2 TIMES DAILY PRN
Qty: 20 TABLET | Refills: 0 | Status: SHIPPED | OUTPATIENT
Start: 2023-01-04

## 2023-01-04 NOTE — ED NOTES
Pt reports CHF and having increased shortness of breath, intermittent fevers, as well as a productive cough.

## 2023-01-04 NOTE — ED PROVIDER NOTES
EMERGENCY DEPARTMENT ENCOUNTER    Room Number:  11/11  Date seen:  1/5/2023  PCP: Vianney Almazan MD  Historian: Patient      HPI:  Chief Complaint: Back pain/generalized body aches  A complete HPI/ROS/PMH/PSH/SH/FH are unobtainable due to: None  Context: Colten Hager is a 52 y.o. male who presents to the ED c/o back pain with associated generalized body aches that has been present for the past 3 days.  The patient does report associated fevers and chills as well as a productive cough with the symptoms.  He denies abdominal pain, nausea/vomiting, chest pain, extremity pain, or shortness of breath.  He does state that his wife has similar symptoms at home currently to which she was exposed.  He has been taking Tylenol as well as hydrocodone to treat the fever and body aches without much improvement thus far.        PAST MEDICAL HISTORY  Active Ambulatory Problems     Diagnosis Date Noted   • Essential hypertension 11/15/2020   • Substance abuse (HCC) 11/15/2020   • Dyslipidemia 11/15/2020   • GERD without esophagitis 11/15/2020   • Methamphetamine abuse ([facility]) 11/15/2020   • IV drug abuse ([facility]) 11/15/2020   • Tobacco abuse 11/15/2020   • Chronic systolic congestive heart failure ([facility]) 11/15/2020   • Acute deep vein thrombosis (DVT) of calf muscle vein of left lower extremity ([facility]) 11/17/2020   • Nonischemic dilated cardiomyopathy ([facility]) 11/17/2020   • History of DVT of lower extremity 08/30/2021     Resolved Ambulatory Problems     Diagnosis Date Noted   • Systolic CHF, acute ([facility]) 11/15/2020     Past Medical History:   Diagnosis Date   • Cellulitis    • CHF (congestive heart failure) (Former[facility])    • GERD (gastroesophageal reflux disease)    • Hyperlipidemia    • Hypertension          PAST SURGICAL HISTORY  Past Surgical History:   Procedure Laterality Date   • CARDIAC CATHETERIZATION N/A 11/16/2020    Procedure: Left Heart Cath;  Surgeon: Fletcher Mcmanus MD;  Location: [facility] INVASIVE LOCATION;   Service: Cardiovascular;  Laterality: N/A;   • CARDIAC CATHETERIZATION N/A 11/16/2020    Procedure: Coronary angiography;  Surgeon: Fletcher Mcmanus MD;  Location: [facility] CATH INVASIVE LOCATION;  Service: Cardiovascular;  Laterality: N/A;   • CARDIAC CATHETERIZATION N/A 11/16/2020    Procedure: Left ventriculography;  Surgeon: Fletcher Mcmanus MD;  Location: [facility] CATH INVASIVE LOCATION;  Service: Cardiovascular;  Laterality: N/A;         FAMILY HISTORY  History reviewed. No pertinent family history.      SOCIAL HISTORY  Social History     Socioeconomic History   • Marital status:    Tobacco Use   • Smoking status: Every Day     Packs/day: 1.00     Types: Cigarettes   • Smokeless tobacco: Never   Substance and Sexual Activity   • Alcohol use: No   • Drug use: Yes     Types: Methamphetamines     Comment: last meth use 11/12/2020   • Sexual activity: Defer         ALLERGIES  Patient has no known allergies.        REVIEW OF SYSTEMS  Review of Systems   Constitutional: Positive for chills and fever. Negative for activity change and appetite change.   HENT: Negative for congestion and sore throat.    Eyes: Negative.    Respiratory: Negative for cough and shortness of breath.    Cardiovascular: Negative for chest pain and leg swelling.   Gastrointestinal: Negative for abdominal pain, diarrhea and vomiting.   Endocrine: Negative.    Genitourinary: Negative for decreased urine volume and dysuria.   Musculoskeletal: Positive for back pain and myalgias. Negative for neck pain.   Skin: Negative for rash and wound.   Allergic/Immunologic: Negative.    Neurological: Negative for weakness, numbness and headaches.   Hematological: Negative.    Psychiatric/Behavioral: Negative.    All other systems reviewed and are negative.           PHYSICAL EXAM  ED Triage Vitals [01/03/23 2123]   Temp Heart Rate Resp BP SpO2   (!) 100.8 °F (38.2 °C) 81 18 158/100 98 %      Temp src Heart Rate Source Patient Position BP Location  FiO2 (%)   Tympanic -- -- -- --       Physical Exam  Vitals and nursing note reviewed.   Constitutional:       General: He is not in acute distress.  HENT:      Head: Normocephalic and atraumatic.   Eyes:      Pupils: Pupils are equal, round, and reactive to light.   Cardiovascular:      Rate and Rhythm: Normal rate and regular rhythm.      Heart sounds: Normal heart sounds.   Pulmonary:      Effort: Pulmonary effort is normal. No respiratory distress.      Breath sounds: Normal breath sounds.   Abdominal:      Palpations: Abdomen is soft.      Tenderness: There is no abdominal tenderness. There is no guarding or rebound.   Musculoskeletal:         General: Normal range of motion.      Cervical back: Normal range of motion and neck supple.   Skin:     General: Skin is warm and dry.   Neurological:      Mental Status: He is alert and oriented to person, place, and time.      Sensory: Sensation is intact.   Psychiatric:         Mood and Affect: Mood and affect normal.             Vital signs and nursing notes reviewed.          LAB RESULTS  No results found for this or any previous visit (from the past 24 hour(s)).    Ordered the above labs and reviewed the results.        RADIOLOGY  No Radiology Exams Resulted Within Past 24 Hours    Ordered the above noted radiological studies. Reviewed by me in PACS.            PROCEDURES  Procedures          MEDICATIONS GIVEN IN ER  Medications   sodium chloride 0.9 % bolus 1,000 mL (0 mL Intravenous Stopped 1/4/23 0025)   acetaminophen (TYLENOL) tablet 1,000 mg (1,000 mg Oral Given 1/3/23 2214)   ketorolac (TORADOL) injection 15 mg (15 mg Intravenous Given 1/3/23 2213)                   MEDICAL DECISION MAKING, PROGRESS, and CONSULTS    All labs have been independently reviewed by me.  All radiology studies have been reviewed by me and I have also reviewed the radiology report.   EKG's independently viewed and interpreted by me.  Discussion below represents my analysis of  pertinent findings related to patient's condition, differential diagnosis, treatment plan and final disposition.      Additional sources:  - Discussed/ obtained information from independent historians: Patient only at bedside    - External (non-ED) record review: Upon medical records review, the patient was admitted to the hospital on 11/15/2020 and treated for shortness of breath with congestive heart failure.    - Chronic or social conditions impacting care: None    - Shared decision making: Decision for discharge with outpatient management made in conjunction with myself as well as the patient at bedside.      Orders placed during this visit:  Orders Placed This Encounter   Procedures   • Respiratory Panel PCR w/COVID-19(SARS-CoV-2) DULCE MARIA/PATRIC/JACQUIE/PAD/COR/MAD/SHARIF In-House, NP Swab in UTM/VTM, 3-4 HR TAT - Swab, Nasopharynx   • CT Abdomen Pelvis Without Contrast   • XR Chest 1 View   • Comprehensive Metabolic Panel   • Lipase   • Urinalysis With Microscopic If Indicated (No Culture) - Urine, Clean Catch   • CBC Auto Differential   • Manual Differential   • CBC & Differential           Differential diagnosis:    Differential diagnosis includes but is not limited to urinary tract infection, ureterolithiasis, pneumonia, acute bronchitis, COVID-19, influenza, sepsis, or viral URI.      Independent interpretation of labs, radiology studies, and discussions with consultants:    CT scan of the abdomen and pelvis reviewed by myself showing no signs of ureteral stones or acute inflammatory process.  Chest x-ray reviewed by myself which showed no acute infiltrate or edema.      ED Course as of 01/05/23 1147   Tue Jan 03, 2023 2212 The patient CT was discussed with the radiologist, Dr. Yanez, who reports a negative scan [BM]      ED Course User Index  [BM] Antoine Estrada MD       The patient was wearing a facemask upon entrance into the room and remained in such throughout their visit.  I was wearing PPE including a  facemask, eye protection, as well as gloves at any point entering the room and throughout the visit    DIAGNOSIS  Final diagnoses:   Influenza A   Fever, unspecified fever cause   Myalgia         DISPOSITION  DISCHARGE    Patient discharged in stable condition.    Reviewed implications of results, diagnosis, meds, responsibility to follow up, warning signs and symptoms of possible worsening, potential complications and reasons to return to ER.    Patient/Family voiced understanding of above instructions.    Discussed plan for discharge, as there is no emergent indication for admission. Patient referred to primary care provider for BP management due to today's BP. Pt/family is agreeable and understands need for follow up and repeat testing.  Pt is aware that discharge does not mean that nothing is wrong but it indicates no emergency is present that requires admission and they must continue care with follow-up as given below or physician of their choice.     FOLLOW-UP  Vianney Almazan MD  [address] [id number] [phone]    In 2 days           Medication List      New Prescriptions    naproxen 500 MG EC tablet  Commonly known as: EC NAPROSYN  Take 1 tablet by mouth 2 (Two) Times a Day As Needed for Mild Pain.     ondansetron ODT 4 MG disintegrating tablet  Commonly known as: ZOFRAN-ODT  Place 1 tablet on the tongue Every 8 (Eight) Hours As Needed for Nausea or Vomiting.           Where to Get Your Medications      These medications were sent to [id number] - [place], [place] - [address] AT [facility] - [phone]  - [phone] FX  [address], [facility] [address]    Phone: [phone]   · naproxen 500 MG EC tablet  · ondansetron ODT 4 MG disintegrating tablet                   Latest Documented Vital Signs:  As of 11:47 EST  BP- 108/82 HR- 61 Temp- (!) 100.8 °F (38.2 °C) (Tympanic) O2 sat- 94%              --    Please note that  portions of this were completed with a voice recognition program.       Note Disclaimer: At [facility], we believe that sharing information builds trust and better relationships. You are receiving this note because you are receiving care at [facility] or recently visited. It is possible you will see health information before a provider has talked with you about it. This kind of information can be easy to misunderstand. To help you fully understand what it means for your health, we urge you to discuss this note with your provider.           Antoine Estrada MD  01/05/23 1140

## 2023-01-13 ENCOUNTER — APPOINTMENT (OUTPATIENT)
Dept: GENERAL RADIOLOGY | Facility: HOSPITAL | Age: 53
End: 2023-01-13
Payer: COMMERCIAL

## 2023-01-13 ENCOUNTER — HOSPITAL ENCOUNTER (EMERGENCY)
Facility: HOSPITAL | Age: 53
Discharge: HOME | End: 2023-01-13
Attending: EMERGENCY MEDICINE | Admitting: EMERGENCY MEDICINE
Payer: COMMERCIAL

## 2023-01-13 VITALS
BODY MASS INDEX: 29.25 KG/M2 | TEMPERATURE: 97.3 F | RESPIRATION RATE: 16 BRPM | HEART RATE: 70 BPM | HEIGHT: 68 IN | OXYGEN SATURATION: 100 % | SYSTOLIC BLOOD PRESSURE: 132 MMHG | WEIGHT: 193 LBS | DIASTOLIC BLOOD PRESSURE: 93 MMHG

## 2023-01-13 DIAGNOSIS — I42.0 NONISCHEMIC DILATED CARDIOMYOPATHY: ICD-10-CM

## 2023-01-13 DIAGNOSIS — I50.22 CHRONIC SYSTOLIC CONGESTIVE HEART FAILURE: ICD-10-CM

## 2023-01-13 DIAGNOSIS — J18.9 PNEUMONIA OF LEFT LOWER LOBE DUE TO INFECTIOUS ORGANISM: Primary | ICD-10-CM

## 2023-01-13 LAB
ALBUMIN SERPL-MCNC: 4 G/DL (ref 3.5–5.2)
ALBUMIN/GLOB SERPL: 1.2 G/DL
ALP SERPL-CCNC: 91 U/L (ref 39–117)
ALT SERPL W P-5'-P-CCNC: 24 U/L (ref 1–41)
ANION GAP SERPL CALCULATED.3IONS-SCNC: 10.5 MMOL/L (ref 5–15)
AST SERPL-CCNC: 15 U/L (ref 1–40)
BASOPHILS # BLD AUTO: 0.04 10*3/MM3 (ref 0–0.2)
BASOPHILS NFR BLD AUTO: 0.5 % (ref 0–1.5)
BILIRUB SERPL-MCNC: 0.3 MG/DL (ref 0–1.2)
BUN SERPL-MCNC: 25 MG/DL (ref 6–20)
BUN/CREAT SERPL: 22.9 (ref 7–25)
CALCIUM SPEC-SCNC: 8.9 MG/DL (ref 8.6–10.5)
CHLORIDE SERPL-SCNC: 100 MMOL/L (ref 98–107)
CO2 SERPL-SCNC: 29.5 MMOL/L (ref 22–29)
CREAT SERPL-MCNC: 1.09 MG/DL (ref 0.76–1.27)
DEPRECATED RDW RBC AUTO: 38.2 FL (ref 37–54)
EGFRCR SERPLBLD CKD-EPI 2021: 81.2 ML/MIN/1.73
EOSINOPHIL # BLD AUTO: 0.22 10*3/MM3 (ref 0–0.4)
EOSINOPHIL NFR BLD AUTO: 2.6 % (ref 0.3–6.2)
ERYTHROCYTE [DISTWIDTH] IN BLOOD BY AUTOMATED COUNT: 12.1 % (ref 12.3–15.4)
GLOBULIN UR ELPH-MCNC: 3.4 GM/DL
GLUCOSE SERPL-MCNC: 101 MG/DL (ref 65–99)
HCT VFR BLD AUTO: 41.4 % (ref 37.5–51)
HGB BLD-MCNC: 14.1 G/DL (ref 13–17.7)
HOLD SPECIMEN: NORMAL
HOLD SPECIMEN: NORMAL
IMM GRANULOCYTES # BLD AUTO: 0.04 10*3/MM3 (ref 0–0.05)
IMM GRANULOCYTES NFR BLD AUTO: 0.5 % (ref 0–0.5)
LYMPHOCYTES # BLD AUTO: 2.55 10*3/MM3 (ref 0.7–3.1)
LYMPHOCYTES NFR BLD AUTO: 29.6 % (ref 19.6–45.3)
MCH RBC QN AUTO: 29.8 PG (ref 26.6–33)
MCHC RBC AUTO-ENTMCNC: 34.1 G/DL (ref 31.5–35.7)
MCV RBC AUTO: 87.5 FL (ref 79–97)
MONOCYTES # BLD AUTO: 1.09 10*3/MM3 (ref 0.1–0.9)
MONOCYTES NFR BLD AUTO: 12.7 % (ref 5–12)
NEUTROPHILS NFR BLD AUTO: 4.67 10*3/MM3 (ref 1.7–7)
NEUTROPHILS NFR BLD AUTO: 54.1 % (ref 42.7–76)
NRBC BLD AUTO-RTO: 0 /100 WBC (ref 0–0.2)
NT-PROBNP SERPL-MCNC: 51.2 PG/ML (ref 0–900)
PLATELET # BLD AUTO: 281 10*3/MM3 (ref 140–450)
PMV BLD AUTO: 9.2 FL (ref 6–12)
POTASSIUM SERPL-SCNC: 4.3 MMOL/L (ref 3.5–5.2)
PROCALCITONIN SERPL-MCNC: 0.03 NG/ML (ref 0–0.25)
PROT SERPL-MCNC: 7.4 G/DL (ref 6–8.5)
RBC # BLD AUTO: 4.73 10*6/MM3 (ref 4.14–5.8)
SODIUM SERPL-SCNC: 140 MMOL/L (ref 136–145)
TROPONIN T SERPL-MCNC: <0.01 NG/ML (ref 0–0.03)
WBC NRBC COR # BLD: 8.61 10*3/MM3 (ref 3.4–10.8)
WHOLE BLOOD HOLD COAG: NORMAL
WHOLE BLOOD HOLD SPECIMEN: NORMAL

## 2023-01-13 PROCEDURE — 63710000001 PROMETHAZINE PER 25 MG: Performed by: EMERGENCY MEDICINE

## 2023-01-13 PROCEDURE — 94799 UNLISTED PULMONARY SVC/PX: CPT

## 2023-01-13 PROCEDURE — 93010 ELECTROCARDIOGRAM REPORT: CPT | Performed by: INTERNAL MEDICINE

## 2023-01-13 PROCEDURE — 83880 ASSAY OF NATRIURETIC PEPTIDE: CPT

## 2023-01-13 PROCEDURE — 36415 COLL VENOUS BLD VENIPUNCTURE: CPT

## 2023-01-13 PROCEDURE — 71046 X-RAY EXAM CHEST 2 VIEWS: CPT

## 2023-01-13 PROCEDURE — 93005 ELECTROCARDIOGRAM TRACING: CPT

## 2023-01-13 PROCEDURE — 84145 PROCALCITONIN (PCT): CPT | Performed by: EMERGENCY MEDICINE

## 2023-01-13 PROCEDURE — 99283 EMERGENCY DEPT VISIT LOW MDM: CPT

## 2023-01-13 PROCEDURE — 85025 COMPLETE CBC W/AUTO DIFF WBC: CPT

## 2023-01-13 PROCEDURE — 94761 N-INVAS EAR/PLS OXIMETRY MLT: CPT

## 2023-01-13 PROCEDURE — 94640 AIRWAY INHALATION TREATMENT: CPT

## 2023-01-13 PROCEDURE — 93005 ELECTROCARDIOGRAM TRACING: CPT | Performed by: EMERGENCY MEDICINE

## 2023-01-13 PROCEDURE — 84484 ASSAY OF TROPONIN QUANT: CPT

## 2023-01-13 PROCEDURE — 80053 COMPREHEN METABOLIC PANEL: CPT

## 2023-01-13 RX ORDER — ALBUTEROL SULFATE 90 UG/1
2 AEROSOL, METERED RESPIRATORY (INHALATION) EVERY 4 HOURS PRN
Qty: 18 G | Refills: 0 | Status: SHIPPED | OUTPATIENT
Start: 2023-01-13 | End: 2023-01-13 | Stop reason: SDUPTHER

## 2023-01-13 RX ORDER — DOXYCYCLINE 100 MG/1
100 CAPSULE ORAL 2 TIMES DAILY
Qty: 20 CAPSULE | Refills: 0 | Status: SHIPPED | OUTPATIENT
Start: 2023-01-13

## 2023-01-13 RX ORDER — SODIUM CHLORIDE 0.9 % (FLUSH) 0.9 %
10 SYRINGE (ML) INJECTION AS NEEDED
Status: DISCONTINUED | OUTPATIENT
Start: 2023-01-13 | End: 2023-01-13 | Stop reason: HOSPADM

## 2023-01-13 RX ORDER — ALBUTEROL SULFATE 90 UG/1
2 AEROSOL, METERED RESPIRATORY (INHALATION) EVERY 4 HOURS PRN
Qty: 18 G | Refills: 0 | Status: SHIPPED | OUTPATIENT
Start: 2023-01-13

## 2023-01-13 RX ORDER — IPRATROPIUM BROMIDE AND ALBUTEROL SULFATE 2.5; .5 MG/3ML; MG/3ML
3 SOLUTION RESPIRATORY (INHALATION) ONCE
Status: COMPLETED | OUTPATIENT
Start: 2023-01-13 | End: 2023-01-13

## 2023-01-13 RX ORDER — DEXTROMETHORPHAN HYDROBROMIDE AND PROMETHAZINE HYDROCHLORIDE 15; 6.25 MG/5ML; MG/5ML
5 SYRUP ORAL 4 TIMES DAILY PRN
Qty: 120 ML | Refills: 0 | Status: SHIPPED | OUTPATIENT
Start: 2023-01-13

## 2023-01-13 RX ORDER — LOSARTAN POTASSIUM 25 MG/1
25 TABLET ORAL DAILY
Qty: 90 TABLET | Refills: 0 | Status: SHIPPED | OUTPATIENT
Start: 2023-01-13

## 2023-01-13 RX ORDER — DEXTROMETHORPHAN HYDROBROMIDE AND PROMETHAZINE HYDROCHLORIDE 15; 6.25 MG/5ML; MG/5ML
5 SYRUP ORAL 4 TIMES DAILY PRN
Qty: 120 ML | Refills: 0 | Status: SHIPPED | OUTPATIENT
Start: 2023-01-13 | End: 2023-01-13 | Stop reason: SDUPTHER

## 2023-01-13 RX ORDER — DOXYCYCLINE 100 MG/1
100 CAPSULE ORAL 2 TIMES DAILY
Qty: 20 CAPSULE | Refills: 0 | Status: SHIPPED | OUTPATIENT
Start: 2023-01-13 | End: 2023-01-13 | Stop reason: SDUPTHER

## 2023-01-13 RX ORDER — PROMETHAZINE HYDROCHLORIDE 25 MG/1
25 TABLET ORAL ONCE
Status: COMPLETED | OUTPATIENT
Start: 2023-01-13 | End: 2023-01-13

## 2023-01-13 RX ADMIN — IPRATROPIUM BROMIDE AND ALBUTEROL SULFATE 3 ML: 2.5; .5 SOLUTION RESPIRATORY (INHALATION) at 20:18

## 2023-01-13 RX ADMIN — PROMETHAZINE HYDROCHLORIDE 25 MG: 25 TABLET ORAL at 20:15

## 2023-01-13 NOTE — ED PROVIDER NOTES
EMERGENCY DEPARTMENT ENCOUNTER    Room Number:  30/30  Date seen:  1/13/2023  PCP: Vianney Almazan MD  Historian: Patient      HPI:  Chief Complaint: Shortness of breath, cough    A complete HPI/ROS/PMH/PSH/SH/FH are unobtainable due to: N/A    Context: Colten Hager is a 52 y.o. male who presents to the ED c/o shortness of breath and cough for the past 10 days or so.  Cough is nonproductive but severe and constant.  He does have posttussive emesis and some nausea.  Fevers have gotten better, arthralgias and myalgias have gotten better.  He does feel short of breath.    He reports he was vaccinated for pneumonia last year.  He did not get a flu vaccine.  He states he has not used drugs or smoked in over a week since he got sick.      PAST MEDICAL HISTORY  Active Ambulatory Problems     Diagnosis Date Noted   • Essential hypertension 11/15/2020   • Substance abuse ([facility]) 11/15/2020   • Dyslipidemia 11/15/2020   • GERD without esophagitis 11/15/2020   • Methamphetamine abuse ([facility]) 11/15/2020   • IV drug abuse ([facility]) 11/15/2020   • Tobacco abuse 11/15/2020   • Chronic systolic congestive heart failure ([facility]) 11/15/2020   • Acute deep vein thrombosis (DVT) of calf muscle vein of left lower extremity ([facility]) 11/17/2020   • Nonischemic dilated cardiomyopathy ([facility]) 11/17/2020   • History of DVT of lower extremity 08/30/2021     Resolved Ambulatory Problems     Diagnosis Date Noted   • Systolic CHF, acute ([facility]) 11/15/2020     Past Medical History:   Diagnosis Date   • Cellulitis    • CHF (congestive heart failure) ([facility])    • GERD (gastroesophageal reflux disease)    • Hyperlipidemia    • Hypertension          PAST SURGICAL HISTORY  Past Surgical History:   Procedure Laterality Date   • CARDIAC CATHETERIZATION N/A 11/16/2020    Procedure: Left Heart Cath;  Surgeon: Fletcher Mcmanus MD;  Location: [facility] INVASIVE LOCATION;  Service: Cardiovascular;  Laterality: N/A;   • CARDIAC CATHETERIZATION N/A 11/16/2020     Procedure: Coronary angiography;  Surgeon: Fletcher Mcmanus MD;  Location: [facility] CATH INVASIVE LOCATION;  Service: Cardiovascular;  Laterality: N/A;   • CARDIAC CATHETERIZATION N/A 11/16/2020    Procedure: Left ventriculography;  Surgeon: Fletcher Mcmanus MD;  Location: [facility] CATH INVASIVE LOCATION;  Service: Cardiovascular;  Laterality: N/A;         FAMILY HISTORY  History reviewed. No pertinent family history.      SOCIAL HISTORY  Social History     Socioeconomic History   • Marital status:    Tobacco Use   • Smoking status: Every Day     Packs/day: 1.00     Types: Cigarettes   • Smokeless tobacco: Never   Substance and Sexual Activity   • Alcohol use: No   • Drug use: Yes     Types: Methamphetamines     Comment: last meth use 11/12/2020   • Sexual activity: Defer         ALLERGIES  Patient has no known allergies.        REVIEW OF SYSTEMS  Review of Systems   Constitutional: Positive for fatigue. Negative for fever.   Respiratory: Positive for cough and shortness of breath.    Gastrointestinal: Positive for nausea and vomiting.            PHYSICAL EXAM  ED Triage Vitals   Temp Heart Rate Resp BP SpO2   01/13/23 1723 01/13/23 1723 01/13/23 1723 01/13/23 1745 01/13/23 1723   97.3 °F (36.3 °C) 90 18 115/71 98 %      Temp src Heart Rate Source Patient Position BP Location FiO2 (%)   01/13/23 1723 01/13/23 1723 -- -- --   Tympanic Monitor          Physical Exam      Physical Exam   Constitutional: Pt. is oriented to person, place, and time.  He is well-developed, well-nourished, and in no distress.    HENT: Normocephalic and atraumatic. Pupils are equal, round, and reactive to light.   Neck: Normal range of motion. Neck supple. No JVD present. No tracheal deviation present.   Cardiovascular: Normal rate, regular rhythm and normal heart sounds.   Pulmonary/Chest: Effort normal and breath sounds normal. No stridor. No respiratory distress.  A few scattered wheezes, no rales.   Abdominal: Soft.  No  distension. There is no tenderness. There is no rebound and no guarding.   Musculoskeletal: No edema, tenderness or deformity.   Neurological: He is alert and oriented to person, place, and time.  He has no focal neurologic deficits.  Skin: Skin is warm and dry. Pt. is not diaphoretic.  Psychiatric: Mood, affect normal.  He is pleasant and cooperative.  Nursing note and vitals reviewed.            LAB RESULTS  Recent Results (from the past 24 hour(s))   ECG 12 Lead ED Triage Standing Order; SOA    Collection Time: 01/13/23  5:38 PM   Result Value Ref Range    QT Interval 396 ms   Comprehensive Metabolic Panel    Collection Time: 01/13/23  5:54 PM    Specimen: Blood   Result Value Ref Range    Glucose 101 (H) 65 - 99 mg/dL    BUN 25 (H) 6 - 20 mg/dL    Creatinine 1.09 0.76 - 1.27 mg/dL    Sodium 140 136 - 145 mmol/L    Potassium 4.3 3.5 - 5.2 mmol/L    Chloride 100 98 - 107 mmol/L    CO2 29.5 (H) 22.0 - 29.0 mmol/L    Calcium 8.9 8.6 - 10.5 mg/dL    Total Protein 7.4 6.0 - 8.5 g/dL    Albumin 4.0 3.5 - 5.2 g/dL    ALT (SGPT) 24 1 - 41 U/L    AST (SGOT) 15 1 - 40 U/L    Alkaline Phosphatase 91 39 - 117 U/L    Total Bilirubin 0.3 0.0 - 1.2 mg/dL    Globulin 3.4 gm/dL    A/G Ratio 1.2 g/dL    BUN/Creatinine Ratio 22.9 7.0 - 25.0    Anion Gap 10.5 5.0 - 15.0 mmol/L    eGFR 81.2 >60.0 mL/min/1.73   BNP    Collection Time: 01/13/23  5:54 PM    Specimen: Blood   Result Value Ref Range    proBNP 51.2 0.0 - 900.0 pg/mL   Troponin    Collection Time: 01/13/23  5:54 PM    Specimen: Blood   Result Value Ref Range    Troponin T <0.010 0.000 - 0.030 ng/mL   Green Top (Gel)    Collection Time: 01/13/23  5:54 PM   Result Value Ref Range    Extra Tube Hold for add-ons.    Lavender Top    Collection Time: 01/13/23  5:54 PM   Result Value Ref Range    Extra Tube hold for add-on    Gold Top - SST    Collection Time: 01/13/23  5:54 PM   Result Value Ref Range    Extra Tube Hold for add-ons.    Light Blue Top    Collection Time: 01/13/23   5:54 PM   Result Value Ref Range    Extra Tube Hold for add-ons.    CBC Auto Differential    Collection Time: 01/13/23  5:54 PM    Specimen: Blood   Result Value Ref Range    WBC 8.61 3.40 - 10.80 10*3/mm3    RBC 4.73 4.14 - 5.80 10*6/mm3    Hemoglobin 14.1 13.0 - 17.7 g/dL    Hematocrit 41.4 37.5 - 51.0 %    MCV 87.5 79.0 - 97.0 fL    MCH 29.8 26.6 - 33.0 pg    MCHC 34.1 31.5 - 35.7 g/dL    RDW 12.1 (L) 12.3 - 15.4 %    RDW-SD 38.2 37.0 - 54.0 fl    MPV 9.2 6.0 - 12.0 fL    Platelets 281 140 - 450 10*3/mm3    Neutrophil % 54.1 42.7 - 76.0 %    Lymphocyte % 29.6 19.6 - 45.3 %    Monocyte % 12.7 (H) 5.0 - 12.0 %    Eosinophil % 2.6 0.3 - 6.2 %    Basophil % 0.5 0.0 - 1.5 %    Immature Grans % 0.5 0.0 - 0.5 %    Neutrophils, Absolute 4.67 1.70 - 7.00 10*3/mm3    Lymphocytes, Absolute 2.55 0.70 - 3.10 10*3/mm3    Monocytes, Absolute 1.09 (H) 0.10 - 0.90 10*3/mm3    Eosinophils, Absolute 0.22 0.00 - 0.40 10*3/mm3    Basophils, Absolute 0.04 0.00 - 0.20 10*3/mm3    Immature Grans, Absolute 0.04 0.00 - 0.05 10*3/mm3    nRBC 0.0 0.0 - 0.2 /100 WBC       Ordered the above labs and reviewed the results.        RADIOLOGY  XR Chest 2 View    Result Date: 1/13/2023  XR CHEST 2 VW-  HISTORY: Male who is 52 years-old,  short of breath  TECHNIQUE: Frontal and lateral views of the chest  COMPARISON: 01/03/2023  FINDINGS: Heart, mediastinum and pulmonary vasculature are unremarkable. Small atelectasis or infiltrate is apparent peripherally at the left mid to lower lung, follow-up suggested. No pleural effusion, or pneumothorax. No acute osseous process.      Small atelectasis or infiltrate apparent peripherally at the left mid to lower lung.  This report was finalized on 1/13/2023 6:29 PM by Dr. Benigno Easton M.D.        Ordered the above noted radiological studies. Reviewed by me in PACS.        PROCEDURES  Procedures      MEDICATIONS GIVEN IN ER  Medications   sodium chloride 0.9 % flush 10 mL (has no administration in time  range)             MEDICAL DECISION MAKING, PROGRESS, and CONSULTS    All labs have been independently reviewed by me.  All radiology studies have been reviewed by me and discussed with radiologist dictating the report.   EKG's independently viewed and interpreted by me.  Discussion below represents my analysis of pertinent findings related to patient's condition, differential diagnosis, treatment plan and final disposition.      Additional sources:  - Discussed/ obtained information from independent historians: Yes-significant other    - External (non-ED) record review: Patient was seen here on 1/3/2023 and diagnosed with influenza.  He has a prior history of IV drug abuse, prior DVT.    - Chronic or social conditions impacting care: IV drug use, lack of primary care      Orders placed during this visit:  Orders Placed This Encounter   Procedures   • XR Chest 2 View   • [place] Draw   • Comprehensive Metabolic Panel   • BNP   • Troponin   • CBC Auto Differential   • Procalcitonin   • NPO Diet NPO Type: Strict NPO   • Undress & Gown   • Cardiac Monitoring   • Continuous Pulse Oximetry   • Vital Signs   • Oxygen Therapy- Nasal Cannula; 2 LPM; Titrate for SPO2: equal to or greater than, 92%   • ECG 12 Lead ED Triage Standing Order; SOA   • Insert Peripheral IV   • CBC & Differential   • Green Top (Gel)   • Lavender Top   • Gold Top - SST   • Light Blue Top       Additional orders considered but not ordered:  N/A    Differential diagnosis:  Insert dyspnea MDM      Independent interpretation of labs, radiology studies, and discussions with consultants:  ED Course as of 01/13/23 1911 Fri Jan 13, 2023 1908 Given his chest x-ray findings and persistent symptoms, doxycycline will be prescribed.  Phenergan syrup also prescribed for cough and nausea.  Oxygen saturations are 100% on room air.  He is hemodynamically stable and safe for outpatient treatment. [WC]      ED Course User Index  [WC] Ranjeet Richard MD               Appropriate PPE was worn by myself and the patient throughout our entire interaction.    DIAGNOSIS  Final diagnoses:   Pneumonia of left lower lobe due to infectious organism         DISPOSITION  Discharged            Latest Documented Vital Signs:  As of 19:11 EST  BP- 132/93 HR- 63 Temp- 97.3 °F (36.3 °C) (Tympanic) O2 sat- 100%        --    Please note that portions of this were completed with a voice recognition program.       Note Disclaimer: At [facility], we believe that sharing information builds trust and better relationships. You are receiving this note because you are receiving care at [facility] or recently visited. It is possible you will see health information before a provider has talked with you about it. This kind of information can be easy to misunderstand. To help you fully understand what it means for your health, we urge you to discuss this note with your provider.           Ranjeet Richard MD  01/13/23 1911

## 2023-01-13 NOTE — ED NOTES
Pt to the ED for soa and cough that started about a week ago. Pt was seen here about a week ago and diagnosed with the flu and he states he has not got any better.

## 2023-01-16 LAB — QT INTERVAL: 396 MS

## 2023-04-26 DIAGNOSIS — I50.22 CHRONIC SYSTOLIC CONGESTIVE HEART FAILURE: ICD-10-CM

## 2023-04-26 DIAGNOSIS — I42.0 NONISCHEMIC DILATED CARDIOMYOPATHY: ICD-10-CM

## 2023-04-26 RX ORDER — FUROSEMIDE 80 MG
80 TABLET ORAL DAILY
Qty: 30 TABLET | Refills: 0 | Status: SHIPPED | OUTPATIENT
Start: 2023-04-26

## 2023-04-26 RX ORDER — LOSARTAN POTASSIUM 25 MG/1
25 TABLET ORAL DAILY
Qty: 30 TABLET | Refills: 0 | Status: SHIPPED | OUTPATIENT
Start: 2023-04-26

## 2023-04-26 RX ORDER — METOPROLOL SUCCINATE 25 MG/1
25 TABLET, EXTENDED RELEASE ORAL DAILY
Qty: 30 TABLET | Refills: 0 | Status: SHIPPED | OUTPATIENT
Start: 2023-04-26

## 2023-08-21 DIAGNOSIS — I42.0 NONISCHEMIC DILATED CARDIOMYOPATHY: ICD-10-CM

## 2023-08-21 DIAGNOSIS — I50.22 CHRONIC SYSTOLIC CONGESTIVE HEART FAILURE: ICD-10-CM

## 2023-08-22 RX ORDER — FUROSEMIDE 80 MG
80 TABLET ORAL DAILY
Qty: 30 TABLET | Refills: 0 | OUTPATIENT
Start: 2023-08-22

## 2023-08-22 RX ORDER — LOSARTAN POTASSIUM 25 MG/1
25 TABLET ORAL DAILY
Qty: 90 TABLET | OUTPATIENT
Start: 2023-08-22

## 2023-10-23 ENCOUNTER — TELEPHONE (OUTPATIENT)
Dept: CARDIOLOGY | Facility: CLINIC | Age: 53
End: 2023-10-23
Payer: COMMERCIAL

## 2024-04-10 ENCOUNTER — APPOINTMENT (OUTPATIENT)
Dept: GENERAL RADIOLOGY | Facility: HOSPITAL | Age: 54
End: 2024-04-10

## 2024-04-10 ENCOUNTER — APPOINTMENT (OUTPATIENT)
Dept: CT IMAGING | Facility: HOSPITAL | Age: 54
End: 2024-04-10

## 2024-04-10 ENCOUNTER — HOSPITAL ENCOUNTER (EMERGENCY)
Facility: HOSPITAL | Age: 54
Discharge: HOME | End: 2024-04-10
Attending: EMERGENCY MEDICINE | Admitting: EMERGENCY MEDICINE

## 2024-04-10 VITALS
OXYGEN SATURATION: 98 % | SYSTOLIC BLOOD PRESSURE: 167 MMHG | HEIGHT: 68 IN | TEMPERATURE: 97.6 F | DIASTOLIC BLOOD PRESSURE: 97 MMHG | HEART RATE: 80 BPM | WEIGHT: 178.2 LBS | RESPIRATION RATE: 20 BRPM | BODY MASS INDEX: 27.01 KG/M2

## 2024-04-10 DIAGNOSIS — I10 UNCONTROLLED HYPERTENSION: ICD-10-CM

## 2024-04-10 DIAGNOSIS — S39.012A STRAIN OF LUMBAR REGION, INITIAL ENCOUNTER: ICD-10-CM

## 2024-04-10 DIAGNOSIS — S16.1XXA ACUTE STRAIN OF NECK MUSCLE, INITIAL ENCOUNTER: ICD-10-CM

## 2024-04-10 DIAGNOSIS — I42.0 NONISCHEMIC DILATED CARDIOMYOPATHY: ICD-10-CM

## 2024-04-10 DIAGNOSIS — I50.22 CHRONIC SYSTOLIC CONGESTIVE HEART FAILURE: ICD-10-CM

## 2024-04-10 DIAGNOSIS — S60.221A CONTUSION OF RIGHT HAND, INITIAL ENCOUNTER: ICD-10-CM

## 2024-04-10 DIAGNOSIS — M50.30 DDD (DEGENERATIVE DISC DISEASE), CERVICAL: ICD-10-CM

## 2024-04-10 DIAGNOSIS — S20.211A CONTUSION OF RIGHT CHEST WALL, INITIAL ENCOUNTER: Primary | ICD-10-CM

## 2024-04-10 DIAGNOSIS — T07.XXXA MULTIPLE ABRASIONS: ICD-10-CM

## 2024-04-10 DIAGNOSIS — M50.20 CERVICAL HERNIATED DISC: ICD-10-CM

## 2024-04-10 PROCEDURE — 72110 X-RAY EXAM L-2 SPINE 4/>VWS: CPT

## 2024-04-10 PROCEDURE — 73130 X-RAY EXAM OF HAND: CPT

## 2024-04-10 PROCEDURE — 76376 3D RENDER W/INTRP POSTPROCES: CPT

## 2024-04-10 PROCEDURE — 99284 EMERGENCY DEPT VISIT MOD MDM: CPT

## 2024-04-10 PROCEDURE — 72125 CT NECK SPINE W/O DYE: CPT

## 2024-04-10 PROCEDURE — 71250 CT THORAX DX C-: CPT

## 2024-04-10 RX ORDER — SPIRONOLACTONE 25 MG/1
25 TABLET ORAL DAILY
Qty: 15 TABLET | Refills: 0 | Status: SHIPPED | OUTPATIENT
Start: 2024-04-10 | End: 2024-04-25

## 2024-04-10 RX ORDER — NAPROXEN SODIUM 550 MG/1
550 TABLET ORAL 2 TIMES DAILY WITH MEALS
Qty: 10 TABLET | Refills: 0 | Status: SHIPPED | OUTPATIENT
Start: 2024-04-10

## 2024-04-10 RX ORDER — METAXALONE 800 MG/1
800 TABLET ORAL 3 TIMES DAILY
Qty: 12 TABLET | Refills: 0 | Status: SHIPPED | OUTPATIENT
Start: 2024-04-10

## 2024-04-10 RX ORDER — SPIRONOLACTONE 25 MG/1
25 TABLET ORAL ONCE
Status: COMPLETED | OUTPATIENT
Start: 2024-04-10 | End: 2024-04-10

## 2024-04-10 RX ADMIN — SPIRONOLACTONE 25 MG: 25 TABLET, FILM COATED ORAL at 10:29

## 2024-04-10 NOTE — ED NOTES
Pt did not want to take medication on an empty stomach; informed pt we are waiting on imaging results before food; did not give medication  Will continue to monitor
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
## 2024-08-22 ENCOUNTER — APPOINTMENT (OUTPATIENT)
Dept: OTHER | Facility: HOSPITAL | Age: 54
DRG: 372 | End: 2024-08-22
Payer: MEDICAID

## 2024-08-23 ENCOUNTER — ANESTHESIA EVENT (OUTPATIENT)
Dept: PERIOP | Facility: HOSPITAL | Age: 54
DRG: 372 | End: 2024-08-23
Payer: MEDICAID

## 2024-08-23 ENCOUNTER — HOSPITAL ENCOUNTER (INPATIENT)
Facility: HOSPITAL | Age: 54
LOS: 2 days | Discharge: HOME | DRG: 372 | End: 2024-08-25
Attending: EMERGENCY MEDICINE | Admitting: INTERNAL MEDICINE
Payer: MEDICAID

## 2024-08-23 ENCOUNTER — ANESTHESIA (OUTPATIENT)
Dept: PERIOP | Facility: HOSPITAL | Age: 54
DRG: 372 | End: 2024-08-23
Payer: MEDICAID

## 2024-08-23 DIAGNOSIS — K35.80 ACUTE APPENDICITIS, UNSPECIFIED ACUTE APPENDICITIS TYPE: Primary | ICD-10-CM

## 2024-08-23 PROBLEM — K35.30 ACUTE APPENDICITIS WITH LOCALIZED PERITONITIS, WITHOUT PERFORATION OR ABSCESS: Status: ACTIVE | Noted: 2024-08-23

## 2024-08-23 LAB
ALBUMIN SERPL-MCNC: 4 G/DL (ref 3.5–5.2)
ALBUMIN/GLOB SERPL: 1.3 G/DL
ALP SERPL-CCNC: 92 U/L (ref 39–117)
ALT SERPL W P-5'-P-CCNC: 14 U/L (ref 1–41)
ANION GAP SERPL CALCULATED.3IONS-SCNC: 11.5 MMOL/L (ref 5–15)
AST SERPL-CCNC: 17 U/L (ref 1–40)
BASOPHILS # BLD AUTO: 0.05 10*3/MM3 (ref 0–0.2)
BASOPHILS NFR BLD AUTO: 0.8 % (ref 0–1.5)
BILIRUB SERPL-MCNC: 0.3 MG/DL (ref 0–1.2)
BILIRUB UR QL STRIP: NEGATIVE
BUN SERPL-MCNC: 17 MG/DL (ref 6–20)
BUN/CREAT SERPL: 13 (ref 7–25)
CALCIUM SPEC-SCNC: 9.2 MG/DL (ref 8.6–10.5)
CHLORIDE SERPL-SCNC: 104 MMOL/L (ref 98–107)
CLARITY UR: CLEAR
CO2 SERPL-SCNC: 26.5 MMOL/L (ref 22–29)
COLOR UR: YELLOW
CREAT SERPL-MCNC: 1.31 MG/DL (ref 0.76–1.27)
DEPRECATED RDW RBC AUTO: 40.8 FL (ref 37–54)
EGFRCR SERPLBLD CKD-EPI 2021: 64.7 ML/MIN/1.73
EOSINOPHIL # BLD AUTO: 0.09 10*3/MM3 (ref 0–0.4)
EOSINOPHIL NFR BLD AUTO: 1.5 % (ref 0.3–6.2)
ERYTHROCYTE [DISTWIDTH] IN BLOOD BY AUTOMATED COUNT: 12.4 % (ref 12.3–15.4)
GLOBULIN UR ELPH-MCNC: 3.1 GM/DL
GLUCOSE SERPL-MCNC: 110 MG/DL (ref 65–99)
GLUCOSE UR STRIP-MCNC: NEGATIVE MG/DL
HCT VFR BLD AUTO: 39.9 % (ref 37.5–51)
HGB BLD-MCNC: 13.4 G/DL (ref 13–17.7)
HGB UR QL STRIP.AUTO: NEGATIVE
IMM GRANULOCYTES # BLD AUTO: 0.01 10*3/MM3 (ref 0–0.05)
IMM GRANULOCYTES NFR BLD AUTO: 0.2 % (ref 0–0.5)
KETONES UR QL STRIP: NEGATIVE
LEUKOCYTE ESTERASE UR QL STRIP.AUTO: NEGATIVE
LIPASE SERPL-CCNC: 21 U/L (ref 13–60)
LYMPHOCYTES # BLD AUTO: 1.27 10*3/MM3 (ref 0.7–3.1)
LYMPHOCYTES NFR BLD AUTO: 20.6 % (ref 19.6–45.3)
MCH RBC QN AUTO: 30.7 PG (ref 26.6–33)
MCHC RBC AUTO-ENTMCNC: 33.6 G/DL (ref 31.5–35.7)
MCV RBC AUTO: 91.3 FL (ref 79–97)
MONOCYTES # BLD AUTO: 0.56 10*3/MM3 (ref 0.1–0.9)
MONOCYTES NFR BLD AUTO: 9.1 % (ref 5–12)
NEUTROPHILS NFR BLD AUTO: 4.18 10*3/MM3 (ref 1.7–7)
NEUTROPHILS NFR BLD AUTO: 67.8 % (ref 42.7–76)
NITRITE UR QL STRIP: NEGATIVE
NRBC BLD AUTO-RTO: 0 /100 WBC (ref 0–0.2)
PH UR STRIP.AUTO: 6.5 [PH] (ref 5–8)
PLATELET # BLD AUTO: 182 10*3/MM3 (ref 140–450)
PMV BLD AUTO: 9.8 FL (ref 6–12)
POTASSIUM SERPL-SCNC: 3.8 MMOL/L (ref 3.5–5.2)
PROT SERPL-MCNC: 7.1 G/DL (ref 6–8.5)
PROT UR QL STRIP: ABNORMAL
QT INTERVAL: 408 MS
QTC INTERVAL: 441 MS
RBC # BLD AUTO: 4.37 10*6/MM3 (ref 4.14–5.8)
SODIUM SERPL-SCNC: 142 MMOL/L (ref 136–145)
SP GR UR STRIP: 1.03 (ref 1–1.03)
UROBILINOGEN UR QL STRIP: ABNORMAL
WBC NRBC COR # BLD AUTO: 6.16 10*3/MM3 (ref 3.4–10.8)

## 2024-08-23 PROCEDURE — 25810000003 SODIUM CHLORIDE 0.9 % SOLUTION: Performed by: INTERNAL MEDICINE

## 2024-08-23 PROCEDURE — 25010000002 PIPERACILLIN SOD-TAZOBACTAM PER 1 G: Performed by: SURGERY

## 2024-08-23 PROCEDURE — 80053 COMPREHEN METABOLIC PANEL: CPT | Performed by: EMERGENCY MEDICINE

## 2024-08-23 PROCEDURE — 25010000002 ONDANSETRON PER 1 MG: Performed by: EMERGENCY MEDICINE

## 2024-08-23 PROCEDURE — 25010000002 PIPERACILLIN SOD-TAZOBACTAM PER 1 G: Performed by: EMERGENCY MEDICINE

## 2024-08-23 PROCEDURE — 25810000003 SODIUM CHLORIDE 0.9 % SOLUTION: Performed by: EMERGENCY MEDICINE

## 2024-08-23 PROCEDURE — 25010000002 MORPHINE PER 10 MG: Performed by: EMERGENCY MEDICINE

## 2024-08-23 PROCEDURE — 83690 ASSAY OF LIPASE: CPT | Performed by: EMERGENCY MEDICINE

## 2024-08-23 PROCEDURE — 25010000002 MORPHINE PER 10 MG: Performed by: INTERNAL MEDICINE

## 2024-08-23 PROCEDURE — 93005 ELECTROCARDIOGRAM TRACING: CPT | Performed by: INTERNAL MEDICINE

## 2024-08-23 PROCEDURE — 81003 URINALYSIS AUTO W/O SCOPE: CPT | Performed by: EMERGENCY MEDICINE

## 2024-08-23 PROCEDURE — 85025 COMPLETE CBC W/AUTO DIFF WBC: CPT | Performed by: EMERGENCY MEDICINE

## 2024-08-23 PROCEDURE — G0463 HOSPITAL OUTPT CLINIC VISIT: HCPCS | Performed by: SURGERY

## 2024-08-23 PROCEDURE — 93010 ELECTROCARDIOGRAM REPORT: CPT | Performed by: INTERNAL MEDICINE

## 2024-08-23 PROCEDURE — 99204 OFFICE O/P NEW MOD 45 MIN: CPT | Performed by: SURGERY

## 2024-08-23 PROCEDURE — 99285 EMERGENCY DEPT VISIT HI MDM: CPT

## 2024-08-23 RX ORDER — ONDANSETRON 2 MG/ML
4 INJECTION INTRAMUSCULAR; INTRAVENOUS ONCE
Status: COMPLETED | OUTPATIENT
Start: 2024-08-23 | End: 2024-08-23

## 2024-08-23 RX ORDER — NAPROXEN SODIUM 550 MG/1
1 TABLET ORAL
COMMUNITY
Start: 2024-04-10 | End: 2024-08-25

## 2024-08-23 RX ORDER — ONDANSETRON 4 MG/1
4 TABLET, FILM COATED ORAL EVERY 8 HOURS PRN
Qty: 20 TABLET | Refills: 0 | Status: SHIPPED | OUTPATIENT
Start: 2024-08-23 | End: 2024-08-24

## 2024-08-23 RX ORDER — HYDROCODONE BITARTRATE AND ACETAMINOPHEN 5; 325 MG/1; MG/1
1 TABLET ORAL EVERY 6 HOURS PRN
Qty: 10 TABLET | Refills: 0 | Status: SHIPPED | OUTPATIENT
Start: 2024-08-23 | End: 2024-08-24

## 2024-08-23 RX ORDER — SODIUM CHLORIDE 9 MG/ML
50 INJECTION, SOLUTION INTRAVENOUS CONTINUOUS
Status: DISCONTINUED | OUTPATIENT
Start: 2024-08-23 | End: 2024-08-25 | Stop reason: HOSPADM

## 2024-08-23 RX ORDER — MORPHINE SULFATE 2 MG/ML
4 INJECTION, SOLUTION INTRAMUSCULAR; INTRAVENOUS EVERY 4 HOURS PRN
Status: DISCONTINUED | OUTPATIENT
Start: 2024-08-23 | End: 2024-08-25 | Stop reason: HOSPADM

## 2024-08-23 RX ORDER — ONDANSETRON 4 MG/1
4 TABLET, ORALLY DISINTEGRATING ORAL EVERY 6 HOURS PRN
Status: DISCONTINUED | OUTPATIENT
Start: 2024-08-23 | End: 2024-08-25 | Stop reason: HOSPADM

## 2024-08-23 RX ORDER — NICOTINE 21 MG/24HR
1 PATCH, TRANSDERMAL 24 HOURS TRANSDERMAL
Status: DISCONTINUED | OUTPATIENT
Start: 2024-08-23 | End: 2024-08-25 | Stop reason: HOSPADM

## 2024-08-23 RX ORDER — SODIUM CHLORIDE 9 MG/ML
125 INJECTION, SOLUTION INTRAVENOUS CONTINUOUS
Status: DISCONTINUED | OUTPATIENT
Start: 2024-08-23 | End: 2024-08-24

## 2024-08-23 RX ORDER — ACETAMINOPHEN 650 MG/1
650 SUPPOSITORY RECTAL EVERY 4 HOURS PRN
Status: DISCONTINUED | OUTPATIENT
Start: 2024-08-23 | End: 2024-08-25 | Stop reason: HOSPADM

## 2024-08-23 RX ORDER — ACETAMINOPHEN 160 MG/5ML
650 SOLUTION ORAL EVERY 4 HOURS PRN
Status: DISCONTINUED | OUTPATIENT
Start: 2024-08-23 | End: 2024-08-25 | Stop reason: HOSPADM

## 2024-08-23 RX ORDER — POLYETHYLENE GLYCOL 3350 17 G/17G
17 POWDER, FOR SOLUTION ORAL DAILY
Status: DISCONTINUED | OUTPATIENT
Start: 2024-08-23 | End: 2024-08-24

## 2024-08-23 RX ORDER — MORPHINE SULFATE 2 MG/ML
4 INJECTION, SOLUTION INTRAMUSCULAR; INTRAVENOUS ONCE
Status: COMPLETED | OUTPATIENT
Start: 2024-08-23 | End: 2024-08-23

## 2024-08-23 RX ORDER — SODIUM CHLORIDE 0.9 % (FLUSH) 0.9 %
10 SYRINGE (ML) INJECTION AS NEEDED
Status: DISCONTINUED | OUTPATIENT
Start: 2024-08-23 | End: 2024-08-24

## 2024-08-23 RX ORDER — METHYLPREDNISOLONE 4 MG
TABLET, DOSE PACK ORAL SEE ADMIN INSTRUCTIONS
COMMUNITY
Start: 2024-06-16 | End: 2024-08-25

## 2024-08-23 RX ORDER — ONDANSETRON 2 MG/ML
4 INJECTION INTRAMUSCULAR; INTRAVENOUS EVERY 6 HOURS PRN
Status: DISCONTINUED | OUTPATIENT
Start: 2024-08-23 | End: 2024-08-25 | Stop reason: HOSPADM

## 2024-08-23 RX ORDER — ACETAMINOPHEN 325 MG/1
650 TABLET ORAL EVERY 4 HOURS PRN
Status: DISCONTINUED | OUTPATIENT
Start: 2024-08-23 | End: 2024-08-25 | Stop reason: HOSPADM

## 2024-08-23 RX ORDER — LOSARTAN POTASSIUM 25 MG/1
25 TABLET ORAL DAILY
Status: DISCONTINUED | OUTPATIENT
Start: 2024-08-24 | End: 2024-08-25 | Stop reason: HOSPADM

## 2024-08-23 RX ORDER — METAXALONE 800 MG/1
1 TABLET ORAL 3 TIMES DAILY
COMMUNITY
Start: 2024-04-10 | End: 2024-08-25

## 2024-08-23 RX ADMIN — MORPHINE SULFATE 4 MG: 2 INJECTION, SOLUTION INTRAMUSCULAR; INTRAVENOUS at 23:25

## 2024-08-23 RX ADMIN — POLYETHYLENE GLYCOL 3350 17 G: 17 POWDER, FOR SOLUTION ORAL at 20:18

## 2024-08-23 RX ADMIN — PIPERACILLIN AND TAZOBACTAM 3.38 G: 3; .375 INJECTION, POWDER, FOR SOLUTION INTRAVENOUS at 12:09

## 2024-08-23 RX ADMIN — PIPERACILLIN AND TAZOBACTAM 3.38 G: 3; .375 INJECTION, POWDER, FOR SOLUTION INTRAVENOUS at 20:19

## 2024-08-23 RX ADMIN — ONDANSETRON 4 MG: 2 INJECTION, SOLUTION INTRAMUSCULAR; INTRAVENOUS at 13:53

## 2024-08-23 RX ADMIN — NICOTINE 1 PATCH: 14 PATCH TRANSDERMAL at 20:18

## 2024-08-23 RX ADMIN — MORPHINE SULFATE 4 MG: 2 INJECTION, SOLUTION INTRAMUSCULAR; INTRAVENOUS at 19:01

## 2024-08-23 RX ADMIN — SODIUM CHLORIDE 125 ML/HR: 9 INJECTION, SOLUTION INTRAVENOUS at 19:00

## 2024-08-23 RX ADMIN — MORPHINE SULFATE 4 MG: 2 INJECTION, SOLUTION INTRAMUSCULAR; INTRAVENOUS at 13:53

## 2024-08-23 RX ADMIN — SODIUM CHLORIDE 50 ML/HR: 9 INJECTION, SOLUTION INTRAVENOUS at 20:20

## 2024-08-23 RX ADMIN — SODIUM CHLORIDE 125 ML/HR: 9 INJECTION, SOLUTION INTRAVENOUS at 12:11
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
## 2024-08-23 NOTE — PROGRESS NOTES
During preop eval, noted that pt has hx NICM w/ most recent documented LVEF 10% in 2020 as well as hx chronic meth use. D/w pt in preop area, who stated that he has not followed up with a cardiologist recently, has had no repeat echo since 2020, and continues to use meth (most recently used today). Pt does ride a bike, but admits to intermittent dull CP, Christopher and difficulty climbing stairs. Pt has normal vital signs preop, appears nontoxic, and preop WBC is normal. I d/w the patient and Dr Montalvo that I think hospital admission and further testing/optimization are warranted prior to operation given that he is stable preop and has significant cardiac risk factors. Dr Montalvo agreed and recommended that pt be admitted to the hospitalist team, treated w/ abx, and that we obtain a cardiology consult. I d/w pt further, but he is refusing admission and wishes to leave AMA with PO abx. I reiterated to him that I think the safest course of action is for him to be admitted. He is talking things over with his wife and is planning to speak with Dr Montalvo as well after she finishes her current surgery, but at this time is still leaning towards leaving AMA.

## 2024-08-23 NOTE — PROGRESS NOTES
[facility] Clinical Pharmacy Services: Piperacillin-Tazobactam Consult    Pt Name: Colten Hager   : 1970    Indication: Intra-Abdominal Infection    Relevant clinical data and objective history reviewed:    Past Medical History:   Diagnosis Date    Cellulitis     CHF (congestive heart failure)     GERD (gastroesophageal reflux disease)     Hyperlipidemia     Hypertension      Creatinine   Date Value Ref Range Status   2024 1.31 (H) 0.76 - 1.27 mg/dL Final   2023 1.09 0.76 - 1.27 mg/dL Final   2023 1.22 0.76 - 1.27 mg/dL Final   2020 1.00 0.7 - 1.5 mg/dL Final   2019 1.1 0.7 - 1.5 mg/dL Final     BUN   Date Value Ref Range Status   2024 17 6 - 20 mg/dL Final   2020 18 7 - 20 mg/dL Final     Estimated Creatinine Clearance: 70.3 mL/min (A) (by C-G formula based on SCr of 1.31 mg/dL (H)).    Lab Results   Component Value Date    WBC 6.16 2024     Temp Readings from Last 3 Encounters:   24 97.8 °F (36.6 °C) (Tympanic)   04/10/24 97.6 °F (36.4 °C)   23 97.3 °F (36.3 °C) (Tympanic)      Assessment/Plan  Estimated CrCl >20 mL/min at this time; BMI 25.85 kg/m2  Will start piperacillin-tazobactam 3.375 g IV every 8 hours for 3 days    Pharmacy will continue to follow daily while on piperacillin-tazobactam and adjust as needed. Thank you for this consult.    April Shetty, [facility]  Clinical Pharmacist
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
## 2024-08-24 LAB
ANION GAP SERPL CALCULATED.3IONS-SCNC: 8 MMOL/L (ref 5–15)
BUN SERPL-MCNC: 14 MG/DL (ref 6–20)
BUN/CREAT SERPL: 12.5 (ref 7–25)
CALCIUM SPEC-SCNC: 8.3 MG/DL (ref 8.6–10.5)
CHLORIDE SERPL-SCNC: 105 MMOL/L (ref 98–107)
CO2 SERPL-SCNC: 27 MMOL/L (ref 22–29)
CREAT SERPL-MCNC: 1.12 MG/DL (ref 0.76–1.27)
DEPRECATED RDW RBC AUTO: 39.2 FL (ref 37–54)
EGFRCR SERPLBLD CKD-EPI 2021: 78.1 ML/MIN/1.73
ERYTHROCYTE [DISTWIDTH] IN BLOOD BY AUTOMATED COUNT: 12 % (ref 12.3–15.4)
GLUCOSE SERPL-MCNC: 96 MG/DL (ref 65–99)
HCT VFR BLD AUTO: 38.9 % (ref 37.5–51)
HGB BLD-MCNC: 12.9 G/DL (ref 13–17.7)
MCH RBC QN AUTO: 30.1 PG (ref 26.6–33)
MCHC RBC AUTO-ENTMCNC: 33.2 G/DL (ref 31.5–35.7)
MCV RBC AUTO: 90.7 FL (ref 79–97)
PLATELET # BLD AUTO: 168 10*3/MM3 (ref 140–450)
PMV BLD AUTO: 9.9 FL (ref 6–12)
POTASSIUM SERPL-SCNC: 4 MMOL/L (ref 3.5–5.2)
RBC # BLD AUTO: 4.29 10*6/MM3 (ref 4.14–5.8)
SODIUM SERPL-SCNC: 140 MMOL/L (ref 136–145)
WBC NRBC COR # BLD AUTO: 5.59 10*3/MM3 (ref 3.4–10.8)

## 2024-08-24 PROCEDURE — 80048 BASIC METABOLIC PNL TOTAL CA: CPT | Performed by: INTERNAL MEDICINE

## 2024-08-24 PROCEDURE — 25010000002 PIPERACILLIN SOD-TAZOBACTAM PER 1 G: Performed by: SURGERY

## 2024-08-24 PROCEDURE — 25010000002 MORPHINE PER 10 MG: Performed by: INTERNAL MEDICINE

## 2024-08-24 PROCEDURE — 99233 SBSQ HOSP IP/OBS HIGH 50: CPT | Performed by: SURGERY

## 2024-08-24 PROCEDURE — 85027 COMPLETE CBC AUTOMATED: CPT | Performed by: INTERNAL MEDICINE

## 2024-08-24 PROCEDURE — 25010000002 ONDANSETRON PER 1 MG: Performed by: INTERNAL MEDICINE

## 2024-08-24 PROCEDURE — 99253 IP/OBS CNSLTJ NEW/EST LOW 45: CPT | Performed by: INTERNAL MEDICINE

## 2024-08-24 RX ORDER — SIMETHICONE 80 MG
80 TABLET,CHEWABLE ORAL 4 TIMES DAILY PRN
Status: DISCONTINUED | OUTPATIENT
Start: 2024-08-24 | End: 2024-08-25 | Stop reason: HOSPADM

## 2024-08-24 RX ORDER — AMOXICILLIN 250 MG
2 CAPSULE ORAL 2 TIMES DAILY
Status: DISCONTINUED | OUTPATIENT
Start: 2024-08-24 | End: 2024-08-25 | Stop reason: HOSPADM

## 2024-08-24 RX ORDER — POLYETHYLENE GLYCOL 3350 17 G/17G
17 POWDER, FOR SOLUTION ORAL 3 TIMES DAILY
Status: DISCONTINUED | OUTPATIENT
Start: 2024-08-24 | End: 2024-08-25 | Stop reason: HOSPADM

## 2024-08-24 RX ADMIN — PIPERACILLIN AND TAZOBACTAM 3.38 G: 3; .375 INJECTION, POWDER, FOR SOLUTION INTRAVENOUS at 20:05

## 2024-08-24 RX ADMIN — PIPERACILLIN AND TAZOBACTAM 3.38 G: 3; .375 INJECTION, POWDER, FOR SOLUTION INTRAVENOUS at 03:15

## 2024-08-24 RX ADMIN — SENNOSIDES AND DOCUSATE SODIUM 2 TABLET: 50; 8.6 TABLET ORAL at 20:05

## 2024-08-24 RX ADMIN — LOSARTAN POTASSIUM 25 MG: 25 TABLET, FILM COATED ORAL at 08:56

## 2024-08-24 RX ADMIN — ONDANSETRON 4 MG: 2 INJECTION INTRAMUSCULAR; INTRAVENOUS at 12:35

## 2024-08-24 RX ADMIN — NICOTINE 1 PATCH: 14 PATCH TRANSDERMAL at 09:00

## 2024-08-24 RX ADMIN — SIMETHICONE 80 MG: 80 TABLET, CHEWABLE ORAL at 05:57

## 2024-08-24 RX ADMIN — POLYETHYLENE GLYCOL 3350 17 G: 17 POWDER, FOR SOLUTION ORAL at 08:56

## 2024-08-24 RX ADMIN — PIPERACILLIN AND TAZOBACTAM 3.38 G: 3; .375 INJECTION, POWDER, FOR SOLUTION INTRAVENOUS at 12:35

## 2024-08-24 RX ADMIN — POLYETHYLENE GLYCOL 3350 17 G: 17 POWDER, FOR SOLUTION ORAL at 20:05

## 2024-08-24 RX ADMIN — MORPHINE SULFATE 4 MG: 2 INJECTION, SOLUTION INTRAMUSCULAR; INTRAVENOUS at 05:58
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
## 2024-08-24 NOTE — PLAN OF CARE
Goal Outcome Evaluation:      Resting in bed with family at bedside. No c/o abdominal or chest pain. Zofran given x1 for nausea.  IV Zosynn q 8. VSS. Will continue to monitor.

## 2024-08-24 NOTE — PROGRESS NOTES
"    Name: Colten Hager ADMIT: 2024   : 1970  PCP: Provider, No Known    MRN: [id number] LOS: 1 days   AGE/SEX: 53 y.o. male  ROOM: [facility]     Subjective   Subjective   Reports feeling better with less abdominal pain.  Mostly concerned about not having a bowel movement for 2 to 3 days.       Objective   Objective   Vital Signs  Temp:  [97.3 °F (36.3 °C)-98.2 °F (36.8 °C)] 97.5 °F (36.4 °C)  Heart Rate:  [66-87] 78  Resp:  [16-18] 16  BP: (123-139)/(81-98) 133/94  SpO2:  [97 %-99 %] 97 %  on   ;   Device (Oxygen Therapy): room air  Body mass index is 25.85 kg/m².  Physical Exam  Vitals and nursing note reviewed.   Constitutional:       Appearance: Normal appearance.   Cardiovascular:      Rate and Rhythm: Normal rate.   Pulmonary:      Effort: Pulmonary effort is normal.   Abdominal:      Tenderness: There is abdominal tenderness.   Neurological:      Mental Status: He is alert. Mental status is at baseline.   Psychiatric:         Mood and Affect: Mood normal.       Results Review     I reviewed the patient's new clinical results.  Results from last 7 days   Lab Units 24  0328 24  1052 24  1721   WBC 10*3/mm3 5.59 6.16 7.59   HEMOGLOBIN g/dL 12.9* 13.4 15.2   PLATELETS 10*3/mm3 168 182 216     Results from last 7 days   Lab Units 24  0328 24  1052   SODIUM mmol/L 140 142   POTASSIUM mmol/L 4.0 3.8   CHLORIDE mmol/L 105 104   CO2 mmol/L 27.0 26.5   BUN mg/dL 14 17   CREATININE mg/dL 1.12 1.31*   GLUCOSE mg/dL 96 110*   EGFR mL/min/1.73 78.1 64.7     Results from last 7 days   Lab Units 24  1052   ALBUMIN g/dL 4.0   BILIRUBIN mg/dL 0.3   ALK PHOS U/L 92   AST (SGOT) U/L 17   ALT (SGPT) U/L 14     Results from last 7 days   Lab Units 24  0328 24  1052   CALCIUM mg/dL 8.3* 9.2   ALBUMIN g/dL  --  4.0       No results found for: \"HGBA1C\", \"POCGLU\"    No radiology results for the last day    I have personally reviewed all medications:  Scheduled " Medications  losartan, 25 mg, Oral, Daily  nicotine, 1 patch, Transdermal, Q24H  piperacillin-tazobactam, 3.375 g, Intravenous, Q8H  polyethylene glycol, 17 g, Oral, Daily    Infusions  sodium chloride, 125 mL/hr, Last Rate: 125 mL/hr (08/23/24 1900)  sodium chloride, 50 mL/hr, Last Rate: 50 mL/hr (08/23/24 2020)    Diet  Diet: Regular/House; Fluid Consistency: Thin (IDDSI 0)    I have personally reviewed:  [x]  Laboratory   [x]  Microbiology   [x]  Radiology   [x]  EKG/Telemetry  [x]  Cardiology/Vascular   []  Pathology    []  Records       Assessment/Plan     Active Hospital Problems    Diagnosis  POA    **Acute appendicitis with localized peritonitis, without perforation or abscess [K35.30]  Yes    Nonischemic dilated cardiomyopathy [I42.0]  Yes    Chronic systolic congestive heart failure [I50.22]  Yes    Essential hypertension [I10]  Yes    Substance abuse [F19.10]  Yes    Methamphetamine abuse [F15.10]  Yes      Resolved Hospital Problems   No resolved problems to display.       53 y.o. male admitted with Acute appendicitis with localized peritonitis, without perforation or abscess.    Continue antibiotics for appendicitis.  General surgery following regarding this issue.  Will defer to surgery any sort of bowel regimen given his complaint of constipation.  Appreciate cardiology assistance.  History of severe cardiomyopathy related to methamphetamine abuse.  Patient admits to continued methamphetamine abuse.  2D echocardiogram ordered, not yet performed.  High risk for surgery.    Will consult [facility].      SCDs for DVT prophylaxis.  Full code.  Discussed with patient and family.  Anticipate discharge home with family next week.  Expected discharge date/ time has not been documented.      Tanner Patel MD  [facility]  08/24/24  14:20 EDT

## 2024-08-24 NOTE — PROGRESS NOTES
"General Surgery Progress Note       S: Patient reports his abdominal pain is essentially gone.  He has not had a bowel movement in a few days and is worried about that.    O:/94 (BP Location: Right arm, Patient Position: Lying)   Pulse 78   Temp 97.5 °F (36.4 °C) (Oral)   Resp 16   Ht 172.7 cm (68\")   Wt 77.1 kg (170 lb)   SpO2 97%   BMI 25.85 kg/m²     Intake & Output (last day)         08/23 0701 08/24 0700 08/24 0701  08/25 0700    P.O. 780     IV Piggyback 100     Total Intake(mL/kg) 880 (11.4)     Net +880           Urine Unmeasured Occurrence 3 x             GENERAL: awake, alert, no apparent distress  HEENT: normochephalic, atraumatic, moist mucus membranes, clear sclera   CHEST: clear to auscultation, no wheezes, no increased work of breathing  CARDIAC: regular rate and rhythm    ABDOMEN: Soft, nondistended, nontender  EXTREMITIES: no cyanosis, clubbing or edema    SKIN: Warm and moist, no rashes    LABS REVIEWED:   Results from last 7 days   Lab Units 08/24/24  0328 08/23/24  1052 08/22/24  1721   WBC 10*3/mm3 5.59 6.16 7.59   HEMOGLOBIN g/dL 12.9* 13.4 15.2   HEMATOCRIT % 38.9 39.9 46.0   PLATELETS 10*3/mm3 168 182 216     Results from last 7 days   Lab Units 08/24/24  0328 08/23/24  1052   SODIUM mmol/L 140 142   POTASSIUM mmol/L 4.0 3.8   CHLORIDE mmol/L 105 104   CO2 mmol/L 27.0 26.5   BUN mg/dL 14 17   CREATININE mg/dL 1.12 1.31*   CALCIUM mg/dL 8.3* 9.2   BILIRUBIN mg/dL  --  0.3   ALK PHOS U/L  --  92   ALT (SGPT) U/L  --  14   AST (SGOT) U/L  --  17   GLUCOSE mg/dL 96 110*               A/P: 53 y.o. male with acute appendicitis, history of cardiomyopathy most recent EF 10% in 2020, and history of methamphetamine abuse.     Continue antibiotics for appendicitis.  Transition to Augmentin twice daily for 7 days upon discharge.   I will increase bowel regimen for constipation.  Counseled patient on risk of recurrence of appendicitis with nonoperative treatment.      BILLY PIERRE, " MD  General, Robotic and Endoscopic Surgery  [facility]    [address], [address]  [place], [place], [address]  P: [phone]  F: [phone]

## 2024-08-24 NOTE — CONSULTS
[facility]  Cardiology Consult Note    Patient Identification:  Name: Colten Hager  Age: 53 y.o.  Sex: male  :  1970  MRN: [id number]             Requesting Physician: Dr. Vo    Reason for Consultation / Chief Complaint: management recommendations cardiomyopathy    History of Present Illness:   53-year-old presented to the hospital with a right lower quadrant abdominal pain went to the [facility] was found to have a possible appendicitis    Denies any chest pains or tightness in the chest    Comorbid cardiac risk factors:     Past Medical History:  Past Medical History:   Diagnosis Date    Cellulitis     CHF (congestive heart failure)     GERD (gastroesophageal reflux disease)     Hyperlipidemia     Hypertension      Past Surgical History:  Past Surgical History:   Procedure Laterality Date    CARDIAC CATHETERIZATION N/A 2020    Procedure: Left Heart Cath;  Surgeon: Fletcher Mcmanus MD;  Location:  [facility] CATH INVASIVE LOCATION;  Service: Cardiovascular;  Laterality: N/A;    CARDIAC CATHETERIZATION N/A 2020    Procedure: Coronary angiography;  Surgeon: Fletcher Mcmanus MD;  Location:  [facility] CATH INVASIVE LOCATION;  Service: Cardiovascular;  Laterality: N/A;    CARDIAC CATHETERIZATION N/A 2020    Procedure: Left ventriculography;  Surgeon: Fletcher Mcmanus MD;  Location:  [facility] CATH INVASIVE LOCATION;  Service: Cardiovascular;  Laterality: N/A;      Allergies:  No Known Allergies  Home Meds:  Medications Prior to Admission   Medication Sig Dispense Refill Last Dose    losartan (COZAAR) 25 MG tablet Take 1 tablet by mouth Daily. NEEDS AN APPOINTMENT FOR FUTURE REFILLS 30 tablet 0 2024 at 0800    metaxalone (SKELAXIN) 800 MG tablet Take 1 tablet by mouth 3 (Three) Times a Day.       methylPREDNISolone (MEDROL) 4 MG dose pack See Admin Instructions.       naproxen sodium (ANAPROX) 550 MG tablet Take 1 tablet by mouth.       sucralfate  (CARAFATE) 1 g tablet Take 1 tablet by mouth 4 (Four) Times a Day. 16 tablet 0 8/22/2024 at 0800    albuterol sulfate  (90 Base) MCG/ACT inhaler Inhale 2 puffs Every 4 (Four) Hours As Needed for Wheezing. 18 g 0     doxycycline (MONODOX) 100 MG capsule Take 1 capsule by mouth 2 (Two) Times a Day. 20 capsule 0     furosemide (LASIX) 80 MG tablet Take 1 tablet by mouth Daily. NEEDS AN APPOINTMENT FOR FUTURE REFILLS 30 tablet 0     metoprolol succinate XL (TOPROL-XL) 25 MG 24 hr tablet Take 1 tablet by mouth Daily. NEEDS AN APPOINTMENT FOR FUTURE REFILLS 30 tablet 0     ondansetron ODT (ZOFRAN-ODT) 4 MG disintegrating tablet Place 1 tablet on the tongue Every 8 (Eight) Hours As Needed for Nausea or Vomiting. 9 tablet 0     polyethylene glycol (MIRALAX) 17 GM/SCOOP powder Take 17 g by mouth Daily. 500 g 0     promethazine-dextromethorphan (PROMETHAZINE-DM) 6.25-15 MG/5ML syrup Take 5 mL by mouth 4 (Four) Times a Day As Needed for Cough. 120 mL 0     sertraline (ZOLOFT) 50 MG tablet Take 1 tablet by mouth Daily.       spironolactone (ALDACTONE) 25 MG tablet Take 1 tablet by mouth Daily for 15 days. 15 tablet 0      Current Meds:   [unfilled]  Social History:   Social History     Tobacco Use    Smoking status: Every Day     Current packs/day: 1.00     Types: Cigarettes    Smokeless tobacco: Never   Substance Use Topics    Alcohol use: No      Family History:  History reviewed. No pertinent family history.     Review of Systems    Constitutional: No weakness,fatigue, fever, rigors, chills   Eyes: No vision changes, eye pain   ENT/oropharynx: No difficulty swallowing, sore throat, epistaxis, changes in hearing   Cardiovascular: No chest pain, chest tightness, palpitations, paroxysmal nocturnal dyspnea, orthopnea, diaphoresis, dizziness / syncopal episode   Respiratory: No shortness of breath, dyspnea on exertion, cough, wheezing hemoptysis   Gastrointestinal: No abdominal pain, nausea, vomiting, diarrhea, bloody stools    Genitourinary: No hematuria, dysuria   Neurological: No headache, tremors, numbness,  one-sided weakness    Musculoskeletal: No cramps, myalgias,  joint pain, joint swelling   Integument: No rash, edema           Constitutional:  Temp:  [97.3 °F (36.3 °C)-98.2 °F (36.8 °C)] 97.5 °F (36.4 °C)  Heart Rate:  [66-87] 78  Resp:  [16-18] 16  BP: (123-139)/(81-98) 133/94    Physical Exam   General:  Appears in no acute distress  Eyes: PERTL,  HEENT:  No JVD. Thyroid not visibly enlarged. No mucosal pallor or cyanosis  Respiratory: Respirations regular and unlabored at rest. BBS with good air entry in all fields. No crackles, rubs or wheezes auscultated  Cardiovascular: S1S2 Regular rate and rhythm. No murmur, rub or gallop auscultated. No carotid bruits. DP/PT pulses    . No pretibial pitting edema  Gastrointestinal: Abdomen soft, flat, non tender. Bowel sounds present. No hepatosplenomegaly. No ascites  Musculoskeletal: ALCOCER x4. No abnormal movements  Extremities: No digital clubbing or cyanosis  Skin: Color pink. Skin warm and dry to touch. No rashes  No xanthoma  Neuro: AAO x3 CN II-XII grossly intact              Cardiographics  ECG:     Telemetry:    Echocardiogram:     Imaging  Chest X-ray:     Lab Review           Results from last 7 days   Lab Units 08/24/24  0328   SODIUM mmol/L 140   POTASSIUM mmol/L 4.0   BUN mg/dL 14   CREATININE mg/dL 1.12   CALCIUM mg/dL 8.3*     @[id number]@  Results from last 7 days   Lab Units 08/24/24  0328 08/23/24  1052 08/22/24  1721   WBC 10*3/mm3 5.59 6.16 7.59   HEMOGLOBIN g/dL 12.9* 13.4 15.2   HEMATOCRIT % 38.9 39.9 46.0   PLATELETS 10*3/mm3 168 182 216             Assessment:  Abdominal pain  History of severe LV dysfunction  Abnormal EKG  Hypertension  Recommendations / Plan:   The last cardiac workup was in 2020 will check echocardiogram for the LV dysfunction  Continue losartan  Clinically no angina pectoris  Patient will be higher than normal risk because of LV  dysfunction  Lucas Singh MD      Labs/tests ordered for am      Lucas Singh MD  8/24/2024, 13:30 EDT      EMR Dragon/Transcription:   Dictated utilizing Dragon dictation

## 2024-08-24 NOTE — PLAN OF CARE
Goal Outcome Evaluation:  Plan of Care Reviewed With: patient, spouse        Progress: no change   A&O. VSS. Up ad oliva. IV abx for appendicitis. C/o gas pain /bloating simethicone ordered. PRN morphine given. Cardiology consulted.

## 2024-08-25 ENCOUNTER — READMISSION MANAGEMENT (OUTPATIENT)
Dept: CALL CENTER | Facility: HOSPITAL | Age: 54
End: 2024-08-25
Payer: MEDICAID

## 2024-08-25 ENCOUNTER — APPOINTMENT (OUTPATIENT)
Dept: CARDIOLOGY | Facility: HOSPITAL | Age: 54
DRG: 372 | End: 2024-08-25
Payer: MEDICAID

## 2024-08-25 VITALS
HEIGHT: 68 IN | RESPIRATION RATE: 16 BRPM | TEMPERATURE: 97.7 F | HEART RATE: 72 BPM | BODY MASS INDEX: 27.13 KG/M2 | DIASTOLIC BLOOD PRESSURE: 80 MMHG | WEIGHT: 179 LBS | OXYGEN SATURATION: 99 % | SYSTOLIC BLOOD PRESSURE: 128 MMHG

## 2024-08-25 LAB
ANION GAP SERPL CALCULATED.3IONS-SCNC: 9.5 MMOL/L (ref 5–15)
AORTIC ARCH: 2.9 CM
AORTIC DIMENSIONLESS INDEX: 0.8 (DI)
ASCENDING AORTA: 2.8 CM
BH CV ECHO LEFT VENTRICLE GLOBAL LONGITUDINAL STRAIN: -8 %
BH CV ECHO MEAS - ACS: 2.01 CM
BH CV ECHO MEAS - AO MAX PG: 6.1 MMHG
BH CV ECHO MEAS - AO MEAN PG: 3.2 MMHG
BH CV ECHO MEAS - AO ROOT DIAM: 3.3 CM
BH CV ECHO MEAS - AO V2 MAX: 123.8 CM/SEC
BH CV ECHO MEAS - AO V2 VTI: 20.9 CM
BH CV ECHO MEAS - AVA(I,D): 2.22 CM2
BH CV ECHO MEAS - EDV(CUBED): 377.7 ML
BH CV ECHO MEAS - EDV(MOD-SP2): 181 ML
BH CV ECHO MEAS - EDV(MOD-SP4): 268 ML
BH CV ECHO MEAS - EF(MOD-BP): 20.2 %
BH CV ECHO MEAS - EF(MOD-SP2): 13.3 %
BH CV ECHO MEAS - EF(MOD-SP4): 18.3 %
BH CV ECHO MEAS - ESV(CUBED): 256.1 ML
BH CV ECHO MEAS - ESV(MOD-SP2): 157 ML
BH CV ECHO MEAS - ESV(MOD-SP4): 219 ML
BH CV ECHO MEAS - FS: 12.1 %
BH CV ECHO MEAS - IVS/LVPW: 1.1 CM
BH CV ECHO MEAS - IVSD: 1.08 CM
BH CV ECHO MEAS - LAT PEAK E' VEL: 13.1 CM/SEC
BH CV ECHO MEAS - LV DIASTOLIC VOL/BSA (35-75): 137.5 CM2
BH CV ECHO MEAS - LV MASS(C)D: 352 GRAMS
BH CV ECHO MEAS - LV MAX PG: 3.2 MMHG
BH CV ECHO MEAS - LV MEAN PG: 1.82 MMHG
BH CV ECHO MEAS - LV SYSTOLIC VOL/BSA (12-30): 112.3 CM2
BH CV ECHO MEAS - LV V1 MAX: 89.1 CM/SEC
BH CV ECHO MEAS - LV V1 VTI: 17.1 CM
BH CV ECHO MEAS - LVIDD: 7.2 CM
BH CV ECHO MEAS - LVIDS: 6.4 CM
BH CV ECHO MEAS - LVOT AREA: 2.7 CM2
BH CV ECHO MEAS - LVOT DIAM: 1.85 CM
BH CV ECHO MEAS - LVPWD: 0.98 CM
BH CV ECHO MEAS - MED PEAK E' VEL: 7.6 CM/SEC
BH CV ECHO MEAS - MR MAX PG: 100.3 MMHG
BH CV ECHO MEAS - MR MAX VEL: 500.9 CM/SEC
BH CV ECHO MEAS - MR MEAN PG: 62.7 MMHG
BH CV ECHO MEAS - MR MEAN VEL: 370.1 CM/SEC
BH CV ECHO MEAS - MR VTI: 156.4 CM
BH CV ECHO MEAS - MV A DUR: 0.1 SEC
BH CV ECHO MEAS - MV A MAX VEL: 50.8 CM/SEC
BH CV ECHO MEAS - MV DEC SLOPE: 795.1 CM/SEC2
BH CV ECHO MEAS - MV DEC TIME: 0.11 SEC
BH CV ECHO MEAS - MV E MAX VEL: 106 CM/SEC
BH CV ECHO MEAS - MV E/A: 2.09
BH CV ECHO MEAS - MV MAX PG: 3.8 MMHG
BH CV ECHO MEAS - MV MEAN PG: 1.59 MMHG
BH CV ECHO MEAS - MV P1/2T: 39.9 MSEC
BH CV ECHO MEAS - MV V2 VTI: 22.2 CM
BH CV ECHO MEAS - MVA(P1/2T): 5.5 CM2
BH CV ECHO MEAS - MVA(VTI): 2.08 CM2
BH CV ECHO MEAS - PA ACC TIME: 0.11 SEC
BH CV ECHO MEAS - PA V2 MAX: 74.4 CM/SEC
BH CV ECHO MEAS - PULM A REVS DUR: 0.11 SEC
BH CV ECHO MEAS - PULM A REVS VEL: 25.9 CM/SEC
BH CV ECHO MEAS - PULM DIAS VEL: 46.3 CM/SEC
BH CV ECHO MEAS - PULM S/D: 1.01
BH CV ECHO MEAS - PULM SYS VEL: 46.8 CM/SEC
BH CV ECHO MEAS - RAP SYSTOLE: 3 MMHG
BH CV ECHO MEAS - RV MAX PG: 1.19 MMHG
BH CV ECHO MEAS - RV V1 MAX: 54.5 CM/SEC
BH CV ECHO MEAS - RV V1 VTI: 10 CM
BH CV ECHO MEAS - RVSP: 33 MMHG
BH CV ECHO MEAS - SV(LVOT): 46.2 ML
BH CV ECHO MEAS - SV(MOD-SP2): 24 ML
BH CV ECHO MEAS - SV(MOD-SP4): 49 ML
BH CV ECHO MEAS - SVI(LVOT): 23.7 ML/M2
BH CV ECHO MEAS - SVI(MOD-SP2): 12.3 ML/M2
BH CV ECHO MEAS - SVI(MOD-SP4): 25.1 ML/M2
BH CV ECHO MEAS - TAPSE (>1.6): 2.31 CM
BH CV ECHO MEAS - TR MAX PG: 30 MMHG
BH CV ECHO MEAS - TR MAX VEL: 273.8 CM/SEC
BH CV ECHO MEASUREMENTS AVERAGE E/E' RATIO: 10.24
BH CV XLRA - RV BASE: 3.6 CM
BH CV XLRA - RV LENGTH: 9.1 CM
BH CV XLRA - RV MID: 2.5 CM
BH CV XLRA - TDI S': 12.4 CM/SEC
BUN SERPL-MCNC: 13 MG/DL (ref 6–20)
BUN/CREAT SERPL: 11.5 (ref 7–25)
CALCIUM SPEC-SCNC: 8.8 MG/DL (ref 8.6–10.5)
CHLORIDE SERPL-SCNC: 103 MMOL/L (ref 98–107)
CO2 SERPL-SCNC: 25.5 MMOL/L (ref 22–29)
CREAT SERPL-MCNC: 1.13 MG/DL (ref 0.76–1.27)
DEPRECATED RDW RBC AUTO: 42.5 FL (ref 37–54)
EGFRCR SERPLBLD CKD-EPI 2021: 77.2 ML/MIN/1.73
ERYTHROCYTE [DISTWIDTH] IN BLOOD BY AUTOMATED COUNT: 12.4 % (ref 12.3–15.4)
GEN 5 2HR TROPONIN T REFLEX: 51 NG/L
GLUCOSE SERPL-MCNC: 115 MG/DL (ref 65–99)
HCT VFR BLD AUTO: 40.5 % (ref 37.5–51)
HGB BLD-MCNC: 13.2 G/DL (ref 13–17.7)
LEFT ATRIUM VOLUME INDEX: 44.9 ML/M2
MCH RBC QN AUTO: 30.1 PG (ref 26.6–33)
MCHC RBC AUTO-ENTMCNC: 32.6 G/DL (ref 31.5–35.7)
MCV RBC AUTO: 92.3 FL (ref 79–97)
PLATELET # BLD AUTO: 168 10*3/MM3 (ref 140–450)
PMV BLD AUTO: 10.1 FL (ref 6–12)
POTASSIUM SERPL-SCNC: 3.7 MMOL/L (ref 3.5–5.2)
RBC # BLD AUTO: 4.39 10*6/MM3 (ref 4.14–5.8)
SINUS: 2.36 CM
SODIUM SERPL-SCNC: 138 MMOL/L (ref 136–145)
TROPONIN T DELTA: -8 NG/L
TROPONIN T SERPL HS-MCNC: 59 NG/L
WBC NRBC COR # BLD AUTO: 7.7 10*3/MM3 (ref 3.4–10.8)

## 2024-08-25 PROCEDURE — 25810000003 SODIUM CHLORIDE 0.9 % SOLUTION: Performed by: INTERNAL MEDICINE

## 2024-08-25 PROCEDURE — 80048 BASIC METABOLIC PNL TOTAL CA: CPT | Performed by: HOSPITALIST

## 2024-08-25 PROCEDURE — 93356 MYOCRD STRAIN IMG SPCKL TRCK: CPT | Performed by: INTERNAL MEDICINE

## 2024-08-25 PROCEDURE — 93010 ELECTROCARDIOGRAM REPORT: CPT | Performed by: INTERNAL MEDICINE

## 2024-08-25 PROCEDURE — 25010000002 PIPERACILLIN SOD-TAZOBACTAM PER 1 G: Performed by: SURGERY

## 2024-08-25 PROCEDURE — 93306 TTE W/DOPPLER COMPLETE: CPT

## 2024-08-25 PROCEDURE — 93356 MYOCRD STRAIN IMG SPCKL TRCK: CPT

## 2024-08-25 PROCEDURE — 84484 ASSAY OF TROPONIN QUANT: CPT | Performed by: INTERNAL MEDICINE

## 2024-08-25 PROCEDURE — 93306 TTE W/DOPPLER COMPLETE: CPT | Performed by: INTERNAL MEDICINE

## 2024-08-25 PROCEDURE — 99233 SBSQ HOSP IP/OBS HIGH 50: CPT | Performed by: SURGERY

## 2024-08-25 PROCEDURE — 25510000001 PERFLUTREN 6.52 MG/ML SUSPENSION 2 ML VIAL: Performed by: INTERNAL MEDICINE

## 2024-08-25 PROCEDURE — 85027 COMPLETE CBC AUTOMATED: CPT | Performed by: HOSPITALIST

## 2024-08-25 PROCEDURE — 99232 SBSQ HOSP IP/OBS MODERATE 35: CPT | Performed by: INTERNAL MEDICINE

## 2024-08-25 PROCEDURE — 93005 ELECTROCARDIOGRAM TRACING: CPT | Performed by: INTERNAL MEDICINE

## 2024-08-25 RX ORDER — BISACODYL 10 MG
10 SUPPOSITORY, RECTAL RECTAL DAILY
Status: DISCONTINUED | OUTPATIENT
Start: 2024-08-25 | End: 2024-08-25 | Stop reason: HOSPADM

## 2024-08-25 RX ORDER — CARVEDILOL 3.12 MG/1
3.12 TABLET ORAL 2 TIMES DAILY WITH MEALS
Qty: 60 TABLET | Refills: 0 | Status: SHIPPED | OUTPATIENT
Start: 2024-08-25

## 2024-08-25 RX ORDER — CARVEDILOL 3.12 MG/1
3.12 TABLET ORAL 2 TIMES DAILY WITH MEALS
Status: DISCONTINUED | OUTPATIENT
Start: 2024-08-25 | End: 2024-08-25 | Stop reason: HOSPADM

## 2024-08-25 RX ADMIN — SENNOSIDES AND DOCUSATE SODIUM 2 TABLET: 50; 8.6 TABLET ORAL at 10:16

## 2024-08-25 RX ADMIN — POLYETHYLENE GLYCOL 3350 17 G: 17 POWDER, FOR SOLUTION ORAL at 10:16

## 2024-08-25 RX ADMIN — PERFLUTREN 2 ML: 6.52 INJECTION, SUSPENSION INTRAVENOUS at 09:30

## 2024-08-25 RX ADMIN — PIPERACILLIN AND TAZOBACTAM 3.38 G: 3; .375 INJECTION, POWDER, FOR SOLUTION INTRAVENOUS at 03:50

## 2024-08-25 RX ADMIN — NICOTINE 1 PATCH: 14 PATCH TRANSDERMAL at 10:41

## 2024-08-25 RX ADMIN — BISACODYL 10 MG: 10 SUPPOSITORY RECTAL at 10:16

## 2024-08-25 RX ADMIN — AMOXICILLIN AND CLAVULANATE POTASSIUM 1 TABLET: 875; 125 TABLET, FILM COATED ORAL at 14:31

## 2024-08-25 RX ADMIN — LOSARTAN POTASSIUM 25 MG: 25 TABLET, FILM COATED ORAL at 10:16

## 2024-08-25 RX ADMIN — SODIUM CHLORIDE 50 ML/HR: 9 INJECTION, SOLUTION INTRAVENOUS at 03:50

## 2024-08-25 NOTE — PROGRESS NOTES
"General Surgery Progress Note       S: Patient getting echo at time of my visit.  His wife reports he has not had a bowel movement.  His abdominal pain is gone.  No nausea or vomiting.    O:/80   Pulse 72   Temp 97.7 °F (36.5 °C) (Oral)   Resp 16   Ht 172.7 cm (68\")   Wt 81.2 kg (179 lb)   SpO2 99%   BMI 27.22 kg/m²     Intake & Output (last day)         08/24 0701 08/25 0700 08/25 0701 08/26 0700    P.O. 480     IV Piggyback      Total Intake(mL/kg) 480 (5.9)     Net +480           Urine Unmeasured Occurrence 1 x               LABS REVIEWED:   Results from last 7 days   Lab Units 08/25/24  0339 08/24/24  0328 08/23/24  1052   WBC 10*3/mm3 7.70 5.59 6.16   HEMOGLOBIN g/dL 13.2 12.9* 13.4   HEMATOCRIT % 40.5 38.9 39.9   PLATELETS 10*3/mm3 168 168 182     Results from last 7 days   Lab Units 08/25/24  0339 08/24/24  0328 08/23/24  1052   SODIUM mmol/L 138 140 142   POTASSIUM mmol/L 3.7 4.0 3.8   CHLORIDE mmol/L 103 105 104   CO2 mmol/L 25.5 27.0 26.5   BUN mg/dL 13 14 17   CREATININE mg/dL 1.13 1.12 1.31*   CALCIUM mg/dL 8.8 8.3* 9.2   BILIRUBIN mg/dL  --   --  0.3   ALK PHOS U/L  --   --  92   ALT (SGPT) U/L  --   --  14   AST (SGOT) U/L  --   --  17   GLUCOSE mg/dL 115* 96 110*             A/P: 53 y.o. male with acute appendicitis and history of cardiomyopathy with EF of 10%.  -Continue MiraLAX.  Patient can continue this upon discharge as needed for constipation.  -Switch patient to Augmentin twice daily.  He should complete a 10-day course of this for appendicitis.  -His wife asked about getting a refund for the Norco which I had prescribed in anticipation of discharge postoperatively before surgery was canceled.  She states that she never received the medicine from pharmacy, however on Friday I was told by preop nurses that they received the medicine.  I asked his nurse to call pharmacy in order to look into this.    Okay for discharge from my standpoint.  He should return to the ER with any " worsening abdominal pain, nausea, vomiting or diarrhea.      BILLY PIERRE MD  General, Robotic and Endoscopic Surgery  [facility]    [address], [address]  [place], [place], [address]  P: [phone]  F: [phone]

## 2024-08-25 NOTE — PROGRESS NOTES
[facility]  Cardiology Progress Note    Patient Identification:  Name: Colten Hager  Age: 53 y.o.  Sex: male  :  1970  MRN: [id number]                 Follow Up / Chief Complaint: Cardiomyopathy    Interval History:  Evidently patient is not going to need the surgery     Subjective:  No chest pains or tightness in the chest    Objective:    Past Medical History:  Past Medical History:   Diagnosis Date    Cellulitis     CHF (congestive heart failure)     GERD (gastroesophageal reflux disease)     Hyperlipidemia     Hypertension      Past Surgical History:  Past Surgical History:   Procedure Laterality Date    CARDIAC CATHETERIZATION N/A 2020    Procedure: Left Heart Cath;  Surgeon: Fletcher Mcmanus MD;  Location: [facility] CATH INVASIVE LOCATION;  Service: Cardiovascular;  Laterality: N/A;    CARDIAC CATHETERIZATION N/A 2020    Procedure: Coronary angiography;  Surgeon: Fletcher Mcmanus MD;  Location: [facility] CATH INVASIVE LOCATION;  Service: Cardiovascular;  Laterality: N/A;    CARDIAC CATHETERIZATION N/A 2020    Procedure: Left ventriculography;  Surgeon: Fletcher Mcmanus MD;  Location: [facility] CATH INVASIVE LOCATION;  Service: Cardiovascular;  Laterality: N/A;        Social History:   Social History     Tobacco Use    Smoking status: Every Day     Current packs/day: 1.00     Types: Cigarettes    Smokeless tobacco: Never   Substance Use Topics    Alcohol use: No      Family History:  History reviewed. No pertinent family history.       Allergies:  No Known Allergies  Scheduled Meds:  amoxicillin-clavulanate, 1 tablet, Q12H  bisacodyl, 10 mg, Daily  losartan, 25 mg, Daily  nicotine, 1 patch, Q24H  polyethylene glycol, 17 g, TID  senna-docusate sodium, 2 tablet, BID            INTAKE AND OUTPUT:    Intake/Output Summary (Last 24 hours) at 2024 0956  Last data filed at 2024 0532  Gross per 24 hour   Intake 480 ml   Output --   Net 480 ml       Review of  "Systems:   GI:  Cardiac: No chest pain  Pulmonary:    Constitutional:  Temp:  [97.3 °F (36.3 °C)-97.9 °F (36.6 °C)] 97.7 °F (36.5 °C)  Heart Rate:  [72-78] 72  Resp:  [16] 16  BP: (125-133)/(80-97) 128/80    Physical Exam:  General:  Appears in no acute distress  Eyes: PERTL,  HEENT:  No JVD. Thyroid not visibly enlarged. No mucosal pallor or cyanosis  Respiratory: Respirations regular and unlabored at rest. BBS with good air entry in all fields. No crackles, rubs or wheezes auscultated  Cardiovascular: S1S2 Regular rate and rhythm. No murmur, rub or gallop. No carotid bruits. DP/PT pulses     . No pretibial pitting edema  Gastrointestinal: Abdomen soft, flat, non tender. Bowel sounds present. No hepatosplenomegaly. No ascites  Musculoskeletal: ALCOCER x4. No abnormal movements  Extremities: No digital clubbing or cyanosis  Skin: Color pink. Skin warm and dry to touch. No rashes    Neuro: AAO x3 CN II-XII grossly intact  Psych: Mood and affect normal, pleasant and cooperative          Cardiographics  Telemetry:     ECG:     Echocardiogram:     Lab Review   Results from last 7 days   Lab Units 08/25/24  0339   HSTROP T ng/L 59*         Results from last 7 days   Lab Units 08/25/24  0339   SODIUM mmol/L 138   POTASSIUM mmol/L 3.7   BUN mg/dL 13   CREATININE mg/dL 1.13   CALCIUM mg/dL 8.8     @[id number]@  Results from last 7 days   Lab Units 08/25/24  0339 08/24/24  0328 08/23/24  1052   WBC 10*3/mm3 7.70 5.59 6.16   HEMOGLOBIN g/dL 13.2 12.9* 13.4   HEMATOCRIT % 40.5 38.9 39.9   PLATELETS 10*3/mm3 168 168 182             Assessment:  Cardiomyopathy  Ejection fraction 20 to 25%      Plan:  Add Coreg 3.125 twice a day  Okay to discharge and follow-up with primary cardiologist  Labs/tests ordered for am      )8/25/2024  MD MAIKOL Pierce Dragon/Transcription:   \"Dictated utilizing Dragon dictation\".     "

## 2024-08-25 NOTE — PLAN OF CARE
Goal Outcome Evaluation:  Plan of Care Reviewed With: patient        Progress: no change       A&O. VSS. Up ad oliva. IV abx for appendicitis. No c/o pain during this shift. Pt still concerned with having a bm.

## 2024-08-25 NOTE — DISCHARGE SUMMARY
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        Hospital Course     Mr. Hager is a 53 y.o. male with a history of nonischemic cardiomyopathy due to methamphetamine abuse who presented to [facility] initially complaining of abdominal pain.  Please see the admitting history and physical for further details.  He was found to have appendicitis and was admitted to the hospital for further evaluation and treatment.  General surgery evaluated patient and recommended nonoperative management and prescribed 10-day course of antibiotic.  He was seen by cardiology and echocardiogram was performed again demonstrating severe cardiomyopathy.  Patient has history of drug abuse.  He was not willing to talk to our access center.  I explained to him that continued drug use could lead to sudden cardiac death.  Discussed with cardiology who only recommendation for today is adding carvedilol to losartan he is already taking.  They recommend follow-up with cardiology.    Will give him information to follow-up with [facility].  Previously seen by Dr. Ward.       Day of Discharge     Subjective:  Feels better.  No new complaints.    Physical Exam:  Temp:  [97.3 °F (36.3 °C)-97.9 °F (36.6 °C)] 97.7 °F (36.5 °C)  Heart Rate:  [72-77] 72  Resp:  [16] 16  BP: (127-133)/(80-92) 128/80  Body mass index  is 27.22 kg/m².  Physical Exam  Vitals and nursing note reviewed.   Constitutional:       Appearance: Normal appearance.   Pulmonary:      Effort: Pulmonary effort is normal.   Neurological:      Mental Status: He is alert. Mental status is at baseline.   Psychiatric:         Mood and Affect: Mood normal.         Consultants     Consult Orders (all) (From admission, onward)       Start     Ordered    08/24/24 1421  Inpatient Access Center Consult  Once        Provider:  (Not yet assigned)    08/24/24 1421    08/23/24 2019  Inpatient Cardiology Consult  Once        Specialty:  Cardiology  Provider:  Michael Ward MD    08/23/24 2019 08/23/24 1754  Inpatient General Surgery Consult  Once        Specialty:  General Surgery  Provider:  Jaclyn Montalvo MD    08/23/24 1753            Procedures       Imaging Results (All)       Procedure Component Value Units Date/Time    CT Outside Films [[id number]] Resulted: 08/23/24 1153     Updated: 08/23/24 1153    Narrative:      This procedure was auto-finalized with no dictation required.     Results for orders placed during the hospital encounter of 08/23/24    Adult Transthoracic Echo Complete W/ Cont if Necessary Per Protocol    Interpretation Summary    The left ventricular cavity is severely dilated.    There is severe global hypokinesis. Best antwon segment appears to be the septum    Left ventricular systolic function is severely decreased. Left ventricular ejection fraction appears to be 21 - 25%.    Left ventricular diastolic function is consistent with (grade II w/high LAP) pseudonormalization.    Normal right ventricular cavity size and systolic function noted.    The left atrial cavity is moderately dilated.    Mild to moderate mitral valve regurgitation is present.    Mild tricuspid valve regurgitation is present    Calculated right ventricular systolic pressure from tricuspid regurgitation is 33 mmHg.    There is no evidence of pericardial  "effusion    Pertinent Labs     Results from last 7 days   Lab Units 08/25/24  0339 08/24/24  0328 08/23/24  1052 08/22/24  1721   WBC 10*3/mm3 7.70 5.59 6.16 7.59   HEMOGLOBIN g/dL 13.2 12.9* 13.4 15.2   PLATELETS 10*3/mm3 168 168 182 216     Results from last 7 days   Lab Units 08/25/24  0339 08/24/24  0328 08/23/24  1052   SODIUM mmol/L 138 140 142   POTASSIUM mmol/L 3.7 4.0 3.8   CHLORIDE mmol/L 103 105 104   CO2 mmol/L 25.5 27.0 26.5   BUN mg/dL 13 14 17   CREATININE mg/dL 1.13 1.12 1.31*   GLUCOSE mg/dL 115* 96 110*   EGFR mL/min/1.73 77.2 78.1 64.7     Results from last 7 days   Lab Units 08/23/24  1052   ALBUMIN g/dL 4.0   BILIRUBIN mg/dL 0.3   ALK PHOS U/L 92   AST (SGOT) U/L 17   ALT (SGPT) U/L 14     Results from last 7 days   Lab Units 08/25/24  0339 08/24/24  0328 08/23/24  1052   CALCIUM mg/dL 8.8 8.3* 9.2   ALBUMIN g/dL  --   --  4.0     Results from last 7 days   Lab Units 08/23/24  1052 08/22/24  1721   LIPASE U/L 21 17     Results from last 7 days   Lab Units 08/25/24  1053 08/25/24  0339   HSTROP T ng/L 51* 59*           Invalid input(s): \"LDLCALC\"          Test Results Pending at Discharge       Discharge Details        Discharge Medications        New Medications        Instructions Start Date   amoxicillin-clavulanate 875-125 MG per tablet  Commonly known as: AUGMENTIN   1 tablet, Oral, Every 12 Hours Scheduled      carvedilol 3.125 MG tablet  Commonly known as: COREG   3.125 mg, Oral, 2 Times Daily With Meals             Continue These Medications        Instructions Start Date   losartan 25 MG tablet  Commonly known as: COZAAR   25 mg, Oral, Daily, NEEDS AN APPOINTMENT FOR FUTURE REFILLS      sucralfate 1 g tablet  Commonly known as: CARAFATE   1 g, Oral, 4 Times Daily             Stop These Medications      albuterol sulfate  (90 Base) MCG/ACT inhaler  Commonly known as: PROVENTIL HFA;VENTOLIN HFA;PROAIR HFA     doxycycline 100 MG capsule  Commonly known as: MONODOX     furosemide 80 " MG tablet  Commonly known as: LASIX     metaxalone 800 MG tablet  Commonly known as: SKELAXIN     methylPREDNISolone 4 MG dose pack  Commonly known as: MEDROL     metoprolol succinate XL 25 MG 24 hr tablet  Commonly known as: TOPROL-XL     naproxen sodium 550 MG tablet  Commonly known as: ANAPROX     ondansetron ODT 4 MG disintegrating tablet  Commonly known as: ZOFRAN-ODT     polyethylene glycol 17 GM/SCOOP powder  Commonly known as: MIRALAX     promethazine-dextromethorphan 6.25-15 MG/5ML syrup  Commonly known as: PROMETHAZINE-DM     sertraline 50 MG tablet  Commonly known as: ZOLOFT     spironolactone 25 MG tablet  Commonly known as: ALDACTONE              No Known Allergies    Discharge Disposition:  Home or Self Care      Discharge Diet:  Diet Order   Procedures    Diet: Regular/House; Fluid Consistency: Thin (IDDSI 0)       Discharge Activity:   Activity Instructions       Activity as Tolerated              CODE STATUS:    Code Status and Medical Interventions: CPR (Attempt to Resuscitate); Full   Ordered at: 08/23/24 6034     Code Status (Patient has no pulse and is not breathing):    CPR (Attempt to Resuscitate)     Medical Interventions (Patient has pulse or is breathing):    Full       No future appointments.  Additional Instructions for the Follow-ups that You Need to Schedule       Discharge Follow-up with PCP   As directed       Currently Documented PCP:    Provider, No Known    PCP Phone Number:    [phone]     Follow Up Details: 1 to 2 weeks (or sooner if problems)               Follow-up Information       Provider, No Known .    Why: 1 to 2 weeks (or sooner if problems)  Contact information:  [facility] [id number] [phone]               Michael Ward MD. Schedule an appointment as soon as possible for a visit.    Specialty: Cardiology  Contact information:  [address]  [address]  [facility] [id number] [phone]               Jaclyn Montalvo MD. Schedule an  appointment as soon as possible for a visit.    Specialty: General Surgery  Contact information:  [address]  [place] [address]  [phone]                             Additional Instructions for the Follow-ups that You Need to Schedule       Discharge Follow-up with PCP   As directed       Currently Documented PCP:    Provider, No Known    PCP Phone Number:    [phone]     Follow Up Details: 1 to 2 weeks (or sooner if problems)            Time Spent on Discharge:  Greater than 30 minutes      Tanner Patel MD  [facility]  08/25/24  14:08 EDT

## 2024-08-25 NOTE — PLAN OF CARE
Goal Outcome Evaluation:   No c/o of abdominal and chest pain. Wife at bedside. Will continue to monitor.

## 2024-08-26 LAB
QT INTERVAL: 563 MS
QTC INTERVAL: 595 MS

## 2024-08-26 NOTE — CASE MANAGEMENT/SOCIAL WORK
Case Management Discharge Note      Final Note: dc home         Selected Continued Care - Discharged on 8/25/2024 Admission date: 8/23/2024 - Discharge disposition: Home or Self Care      Destination    No services have been selected for the patient.                Durable Medical Equipment    No services have been selected for the patient.                Dialysis/Infusion    No services have been selected for the patient.                Home Medical Care    No services have been selected for the patient.                Therapy    No services have been selected for the patient.                Community Resources    No services have been selected for the patient.                Community & DME    No services have been selected for the patient.                    Transportation Services  Private: Car    Final Discharge Disposition Code: 01 - home or self-care

## 2024-08-26 NOTE — NURSING NOTE
RN spoke with main pharmacy and asked to lock-up Norco and Zofran in their narcotic vault that was delivered for patient via meds-to-bed and had been locked up in main pre/post because patient had been admitted to [facility] due to his surgery being canceled. Since the retail pharmacy was closed, Main pharmacy said they would contact retail in the morning to help credit back the medications to the patient since they would no longer need them due to their surgery being canceled.

## 2024-08-26 NOTE — OUTREACH NOTE
Prep Survey      Flowsheet Row Responses   [facility] facility patient discharged from? [place]   Is LACE score < 7 ? No   Eligibility Readm Mgmt   Discharge diagnosis Acute appendicitis with localized peritonitis, without perforation or abscess   Does the patient have one of the following disease processes/diagnoses(primary or secondary)? Other   Does the patient have Home health ordered? No   Is there a DME ordered? No   Prep survey completed? Yes            Vanda RAMSEY - Registered Nurse

## 2024-08-27 ENCOUNTER — READMISSION MANAGEMENT (OUTPATIENT)
Dept: CALL CENTER | Facility: HOSPITAL | Age: 54
End: 2024-08-27
Payer: MEDICAID

## 2024-08-27 NOTE — OUTREACH NOTE
Medical Week 1 Survey      Flowsheet Row Responses   [facility] patient discharged from? [place]   Does the patient have one of the following disease processes/diagnoses(primary or secondary)? Other   Week 1 attempt successful? No   Unsuccessful attempts Attempt 1            Harriet Arndt Registered Nurse

## 2024-08-29 ENCOUNTER — READMISSION MANAGEMENT (OUTPATIENT)
Dept: CALL CENTER | Facility: HOSPITAL | Age: 54
End: 2024-08-29
Payer: MEDICAID

## 2024-08-29 NOTE — OUTREACH NOTE
Medical Week 1 Survey      Flowsheet Row Responses   [facility] patient discharged from? [place]   Does the patient have one of the following disease processes/diagnoses(primary or secondary)? Other   Week 1 attempt successful? No   Unsuccessful attempts Attempt 2  [All numbers listed were attempted-no answer]            Jannette H - Registered Nurse

## 2024-09-05 ENCOUNTER — READMISSION MANAGEMENT (OUTPATIENT)
Dept: CALL CENTER | Facility: HOSPITAL | Age: 54
End: 2024-09-05
Payer: MEDICAID

## 2024-09-05 NOTE — OUTREACH NOTE
Medical Week 2 Survey      Flowsheet Row Responses   [facility] patient discharged from? [place]   Does the patient have one of the following disease processes/diagnoses(primary or secondary)? Other   Week 2 attempt successful? No   Unsuccessful attempts Attempt 1   Revoke Decline to participate            Diamante HARRINGTON - Licensed Nurse

## 2024-09-29 ENCOUNTER — HOSPITAL ENCOUNTER (OUTPATIENT)
Facility: HOSPITAL | Age: 54
Setting detail: OBSERVATION
Discharge: HOME | End: 2024-09-30
Attending: EMERGENCY MEDICINE | Admitting: EMERGENCY MEDICINE
Payer: MEDICAID

## 2024-09-29 ENCOUNTER — APPOINTMENT (OUTPATIENT)
Dept: GENERAL RADIOLOGY | Facility: HOSPITAL | Age: 54
End: 2024-09-29
Payer: MEDICAID

## 2024-09-29 DIAGNOSIS — I42.0 NONISCHEMIC DILATED CARDIOMYOPATHY: ICD-10-CM

## 2024-09-29 DIAGNOSIS — R07.89 CHEST TIGHTNESS: ICD-10-CM

## 2024-09-29 DIAGNOSIS — I50.22 CHRONIC SYSTOLIC CONGESTIVE HEART FAILURE: Primary | ICD-10-CM

## 2024-09-29 DIAGNOSIS — I50.21 ACUTE SYSTOLIC CONGESTIVE HEART FAILURE: ICD-10-CM

## 2024-09-29 DIAGNOSIS — I10 POORLY-CONTROLLED HYPERTENSION: ICD-10-CM

## 2024-09-29 PROBLEM — I50.9 CHF (CONGESTIVE HEART FAILURE): Status: ACTIVE | Noted: 2024-09-29

## 2024-09-29 LAB
ALBUMIN SERPL-MCNC: 3.7 G/DL (ref 3.5–5.2)
ALBUMIN/GLOB SERPL: 1.4 G/DL
ALP SERPL-CCNC: 89 U/L (ref 39–117)
ALT SERPL W P-5'-P-CCNC: 11 U/L (ref 1–41)
ANION GAP SERPL CALCULATED.3IONS-SCNC: 9.2 MMOL/L (ref 5–15)
AST SERPL-CCNC: 13 U/L (ref 1–40)
BASOPHILS # BLD AUTO: 0.03 10*3/MM3 (ref 0–0.2)
BASOPHILS NFR BLD AUTO: 0.3 % (ref 0–1.5)
BILIRUB SERPL-MCNC: 0.5 MG/DL (ref 0–1.2)
BUN SERPL-MCNC: 16 MG/DL (ref 6–20)
BUN/CREAT SERPL: 15.7 (ref 7–25)
CALCIUM SPEC-SCNC: 8.5 MG/DL (ref 8.6–10.5)
CHLORIDE SERPL-SCNC: 105 MMOL/L (ref 98–107)
CO2 SERPL-SCNC: 25.8 MMOL/L (ref 22–29)
CREAT SERPL-MCNC: 1.02 MG/DL (ref 0.76–1.27)
DEPRECATED RDW RBC AUTO: 42.3 FL (ref 37–54)
EGFRCR SERPLBLD CKD-EPI 2021: 87.3 ML/MIN/1.73
EOSINOPHIL # BLD AUTO: 0.24 10*3/MM3 (ref 0–0.4)
EOSINOPHIL NFR BLD AUTO: 2.6 % (ref 0.3–6.2)
ERYTHROCYTE [DISTWIDTH] IN BLOOD BY AUTOMATED COUNT: 12.7 % (ref 12.3–15.4)
GEN 5 2HR TROPONIN T REFLEX: 27 NG/L
GLOBULIN UR ELPH-MCNC: 2.7 GM/DL
GLUCOSE SERPL-MCNC: 100 MG/DL (ref 65–99)
HCT VFR BLD AUTO: 37.7 % (ref 37.5–51)
HGB BLD-MCNC: 12.6 G/DL (ref 13–17.7)
HOLD SPECIMEN: NORMAL
HOLD SPECIMEN: NORMAL
IMM GRANULOCYTES # BLD AUTO: 0.02 10*3/MM3 (ref 0–0.05)
IMM GRANULOCYTES NFR BLD AUTO: 0.2 % (ref 0–0.5)
LYMPHOCYTES # BLD AUTO: 1.12 10*3/MM3 (ref 0.7–3.1)
LYMPHOCYTES NFR BLD AUTO: 12.4 % (ref 19.6–45.3)
MCH RBC QN AUTO: 30.7 PG (ref 26.6–33)
MCHC RBC AUTO-ENTMCNC: 33.4 G/DL (ref 31.5–35.7)
MCV RBC AUTO: 92 FL (ref 79–97)
MONOCYTES # BLD AUTO: 0.95 10*3/MM3 (ref 0.1–0.9)
MONOCYTES NFR BLD AUTO: 10.5 % (ref 5–12)
NEUTROPHILS NFR BLD AUTO: 6.7 10*3/MM3 (ref 1.7–7)
NEUTROPHILS NFR BLD AUTO: 74 % (ref 42.7–76)
NRBC BLD AUTO-RTO: 0 /100 WBC (ref 0–0.2)
NT-PROBNP SERPL-MCNC: 1357 PG/ML (ref 0–900)
PLATELET # BLD AUTO: 173 10*3/MM3 (ref 140–450)
PMV BLD AUTO: 9.5 FL (ref 6–12)
POTASSIUM SERPL-SCNC: 3.9 MMOL/L (ref 3.5–5.2)
PROT SERPL-MCNC: 6.4 G/DL (ref 6–8.5)
QT INTERVAL: 389 MS
QTC INTERVAL: 479 MS
RBC # BLD AUTO: 4.1 10*6/MM3 (ref 4.14–5.8)
SODIUM SERPL-SCNC: 140 MMOL/L (ref 136–145)
TROPONIN T DELTA: -3 NG/L
TROPONIN T SERPL HS-MCNC: 30 NG/L
WBC NRBC COR # BLD AUTO: 9.06 10*3/MM3 (ref 3.4–10.8)
WHOLE BLOOD HOLD COAG: NORMAL
WHOLE BLOOD HOLD SPECIMEN: NORMAL

## 2024-09-29 PROCEDURE — 99254 IP/OBS CNSLTJ NEW/EST MOD 60: CPT

## 2024-09-29 PROCEDURE — 85025 COMPLETE CBC W/AUTO DIFF WBC: CPT | Performed by: EMERGENCY MEDICINE

## 2024-09-29 PROCEDURE — G0378 HOSPITAL OBSERVATION PER HR: HCPCS

## 2024-09-29 PROCEDURE — 93010 ELECTROCARDIOGRAM REPORT: CPT | Performed by: INTERNAL MEDICINE

## 2024-09-29 PROCEDURE — 71045 X-RAY EXAM CHEST 1 VIEW: CPT

## 2024-09-29 PROCEDURE — 93005 ELECTROCARDIOGRAM TRACING: CPT | Performed by: EMERGENCY MEDICINE

## 2024-09-29 PROCEDURE — 83880 ASSAY OF NATRIURETIC PEPTIDE: CPT | Performed by: EMERGENCY MEDICINE

## 2024-09-29 PROCEDURE — 25010000002 FUROSEMIDE PER 20 MG: Performed by: EMERGENCY MEDICINE

## 2024-09-29 PROCEDURE — 96374 THER/PROPH/DIAG INJ IV PUSH: CPT

## 2024-09-29 PROCEDURE — 99285 EMERGENCY DEPT VISIT HI MDM: CPT

## 2024-09-29 PROCEDURE — 84484 ASSAY OF TROPONIN QUANT: CPT | Performed by: EMERGENCY MEDICINE

## 2024-09-29 PROCEDURE — 80053 COMPREHEN METABOLIC PANEL: CPT | Performed by: EMERGENCY MEDICINE

## 2024-09-29 PROCEDURE — 93005 ELECTROCARDIOGRAM TRACING: CPT

## 2024-09-29 RX ORDER — NICOTINE 21 MG/24HR
1 PATCH, TRANSDERMAL 24 HOURS TRANSDERMAL EVERY 24 HOURS
Status: DISCONTINUED | OUTPATIENT
Start: 2024-09-29 | End: 2024-09-30 | Stop reason: HOSPADM

## 2024-09-29 RX ORDER — FUROSEMIDE 10 MG/ML
40 INJECTION INTRAMUSCULAR; INTRAVENOUS EVERY 12 HOURS
Status: DISCONTINUED | OUTPATIENT
Start: 2024-09-30 | End: 2024-09-30 | Stop reason: HOSPADM

## 2024-09-29 RX ORDER — SUCRALFATE 1 G/1
1 TABLET ORAL 4 TIMES DAILY
Status: DISCONTINUED | OUTPATIENT
Start: 2024-09-29 | End: 2024-09-30 | Stop reason: HOSPADM

## 2024-09-29 RX ORDER — BISACODYL 5 MG/1
5 TABLET, DELAYED RELEASE ORAL DAILY PRN
Status: DISCONTINUED | OUTPATIENT
Start: 2024-09-29 | End: 2024-09-30 | Stop reason: HOSPADM

## 2024-09-29 RX ORDER — SODIUM CHLORIDE 0.9 % (FLUSH) 0.9 %
10 SYRINGE (ML) INJECTION EVERY 12 HOURS SCHEDULED
Status: DISCONTINUED | OUTPATIENT
Start: 2024-09-29 | End: 2024-09-30 | Stop reason: HOSPADM

## 2024-09-29 RX ORDER — SODIUM CHLORIDE 0.9 % (FLUSH) 0.9 %
10 SYRINGE (ML) INJECTION AS NEEDED
Status: DISCONTINUED | OUTPATIENT
Start: 2024-09-29 | End: 2024-09-30 | Stop reason: HOSPADM

## 2024-09-29 RX ORDER — SODIUM CHLORIDE 9 MG/ML
40 INJECTION, SOLUTION INTRAVENOUS AS NEEDED
Status: DISCONTINUED | OUTPATIENT
Start: 2024-09-29 | End: 2024-09-30 | Stop reason: HOSPADM

## 2024-09-29 RX ORDER — ONDANSETRON 2 MG/ML
4 INJECTION INTRAMUSCULAR; INTRAVENOUS EVERY 6 HOURS PRN
Status: DISCONTINUED | OUTPATIENT
Start: 2024-09-29 | End: 2024-09-30 | Stop reason: HOSPADM

## 2024-09-29 RX ORDER — AMOXICILLIN 250 MG
2 CAPSULE ORAL 2 TIMES DAILY PRN
Status: DISCONTINUED | OUTPATIENT
Start: 2024-09-29 | End: 2024-09-30 | Stop reason: HOSPADM

## 2024-09-29 RX ORDER — ASPIRIN 325 MG
325 TABLET ORAL ONCE
Status: DISCONTINUED | OUTPATIENT
Start: 2024-09-29 | End: 2024-09-30 | Stop reason: HOSPADM

## 2024-09-29 RX ORDER — CARVEDILOL 3.12 MG/1
3.12 TABLET ORAL 2 TIMES DAILY WITH MEALS
Status: DISCONTINUED | OUTPATIENT
Start: 2024-09-29 | End: 2024-09-30 | Stop reason: HOSPADM

## 2024-09-29 RX ORDER — POLYETHYLENE GLYCOL 3350 17 G/17G
17 POWDER, FOR SOLUTION ORAL DAILY PRN
Status: DISCONTINUED | OUTPATIENT
Start: 2024-09-29 | End: 2024-09-30 | Stop reason: HOSPADM

## 2024-09-29 RX ORDER — FUROSEMIDE 10 MG/ML
80 INJECTION INTRAMUSCULAR; INTRAVENOUS ONCE
Status: COMPLETED | OUTPATIENT
Start: 2024-09-29 | End: 2024-09-29

## 2024-09-29 RX ORDER — BISACODYL 10 MG
10 SUPPOSITORY, RECTAL RECTAL DAILY PRN
Status: DISCONTINUED | OUTPATIENT
Start: 2024-09-29 | End: 2024-09-30 | Stop reason: HOSPADM

## 2024-09-29 RX ORDER — ONDANSETRON 4 MG/1
4 TABLET, ORALLY DISINTEGRATING ORAL EVERY 6 HOURS PRN
Status: DISCONTINUED | OUTPATIENT
Start: 2024-09-29 | End: 2024-09-30 | Stop reason: HOSPADM

## 2024-09-29 RX ORDER — LOSARTAN POTASSIUM 25 MG/1
25 TABLET ORAL DAILY
Status: DISCONTINUED | OUTPATIENT
Start: 2024-09-29 | End: 2024-09-30 | Stop reason: HOSPADM

## 2024-09-29 RX ORDER — ACETAMINOPHEN 325 MG/1
650 TABLET ORAL EVERY 6 HOURS PRN
Status: DISCONTINUED | OUTPATIENT
Start: 2024-09-29 | End: 2024-09-30 | Stop reason: HOSPADM

## 2024-09-29 RX ORDER — ACETAMINOPHEN 325 MG/1
650 TABLET ORAL EVERY 6 HOURS PRN
COMMUNITY
End: 2024-09-30 | Stop reason: HOSPADM

## 2024-09-29 RX ADMIN — Medication 5 MG: at 21:07

## 2024-09-29 RX ADMIN — SUCRALFATE 1 G: 1 TABLET ORAL at 17:09

## 2024-09-29 RX ADMIN — NICOTINE 1 PATCH: 21 PATCH, EXTENDED RELEASE TRANSDERMAL at 14:30

## 2024-09-29 RX ADMIN — Medication 10 ML: at 21:07

## 2024-09-29 RX ADMIN — ACETAMINOPHEN 325MG 650 MG: 325 TABLET ORAL at 21:07

## 2024-09-29 RX ADMIN — CARVEDILOL 3.12 MG: 3.12 TABLET, FILM COATED ORAL at 17:33

## 2024-09-29 RX ADMIN — BENZOCAINE 1 APPLICATION: 0.1 GEL TOPICAL at 22:35

## 2024-09-29 RX ADMIN — SUCRALFATE 1 G: 1 TABLET ORAL at 21:07

## 2024-09-29 RX ADMIN — FUROSEMIDE 80 MG: 10 INJECTION, SOLUTION INTRAMUSCULAR; INTRAVENOUS at 11:25

## 2024-09-29 NOTE — ED PROVIDER NOTES
EMERGENCY DEPARTMENT ENCOUNTER    Room Number:  21/21  PCP: Provider, No Known  Historian: Patient, spouse    I initially evaluated the patient at 11:03 AM    HPI:  Chief Complaint: Shortness of breath, cough  A complete HPI/ROS/PMH/PSH/SH/FH are unobtainable due to: Nothing  Context: Colten Hager is a 53 y.o. male with a medical history of CHF, hypertension, hyperlipidemia who presents to the ED c/o acute shortness of breath and cough.  Symptoms began about 2 weeks ago.  Cough is productive of clear sputum.  Shortness of breath is intermittent and worse with exertion.  He also complains of intermittent chest tightness.  Chest pain does not radiate and is not related to exertion.  Denies nausea, vomiting, abdominal pain, recent weight gain, fever, leg pain, or leg swelling.  Patient reports running out of his Lasix several weeks ago.  He was seen at another ED yesterday and found to have CHF.  Admission was recommended but he left after having a disagreement with the physician.  He is not on home O2.            PAST MEDICAL HISTORY  Active Ambulatory Problems     Diagnosis Date Noted    Essential hypertension 11/15/2020    Substance abuse 11/15/2020    Dyslipidemia 11/15/2020    GERD without esophagitis 11/15/2020    Methamphetamine abuse 11/15/2020    IV drug abuse 11/15/2020    Tobacco abuse 11/15/2020    Chronic systolic congestive heart failure 11/15/2020    Acute deep vein thrombosis (DVT) of calf muscle vein of left lower extremity 11/17/2020    Nonischemic dilated cardiomyopathy 11/17/2020    History of DVT of lower extremity 08/30/2021    Acute appendicitis with localized peritonitis, without perforation or abscess 08/23/2024     Resolved Ambulatory Problems     Diagnosis Date Noted    Systolic CHF, acute 11/15/2020    Acute appendicitis 08/23/2024     Past Medical History:   Diagnosis Date    Cellulitis     CHF (congestive heart failure)     GERD (gastroesophageal reflux disease)     Hyperlipidemia      Hypertension          PAST SURGICAL HISTORY  Past Surgical History:   Procedure Laterality Date    CARDIAC CATHETERIZATION N/A 11/16/2020    Procedure: Left Heart Cath;  Surgeon: Fletcher Mcmanus MD;  Location: [facility] CATH INVASIVE LOCATION;  Service: Cardiovascular;  Laterality: N/A;    CARDIAC CATHETERIZATION N/A 11/16/2020    Procedure: Coronary angiography;  Surgeon: Fletcher Mcmanus MD;  Location: [facility] CATH INVASIVE LOCATION;  Service: Cardiovascular;  Laterality: N/A;    CARDIAC CATHETERIZATION N/A 11/16/2020    Procedure: Left ventriculography;  Surgeon: Fletcher Mcmanus MD;  Location: [facility] CATH INVASIVE LOCATION;  Service: Cardiovascular;  Laterality: N/A;         FAMILY HISTORY  History reviewed. No pertinent family history.      SOCIAL HISTORY  Social History     Socioeconomic History    Marital status:    Tobacco Use    Smoking status: Every Day     Current packs/day: 1.00     Types: Cigarettes    Smokeless tobacco: Never   Vaping Use    Vaping status: Never Used   Substance and Sexual Activity    Alcohol use: No    Drug use: Yes     Types: Methamphetamines     Comment: last iv meth use 8/23/2024    Sexual activity: Defer         ALLERGIES  Patient has no known allergies.    REVIEW OF SYSTEMS  Review of Systems  Included in HPI  All systems reviewed and negative except for those discussed in HPI.      PHYSICAL EXAM  ED Triage Vitals [09/29/24 1041]   Temp Heart Rate Resp BP SpO2   97 °F (36.1 °C) 93 16 -- 96 %      Temp src Heart Rate Source Patient Position BP Location FiO2 (%)   Tympanic Monitor -- -- --       Physical Exam      GENERAL: Awake, alert, oriented x 3.  Well-developed, well-nourished and nontoxic-appearing male.  Resting comfortably in no acute distress  HENT: NCAT, nares patent  EYES: no scleral icterus  CV: regular rhythm, normal rate  RESPIRATORY: normal effort, faint rales in both lung bases  ABDOMEN: soft, nondistended, nontender  MUSCULOSKELETAL: Extremities are  nontender with full range of motion.  No calf tenderness.  No pedal edema  NEURO: Speech is normal.  No facial droop.  Follows commands  PSYCH:  calm, cooperative  SKIN: warm, dry    Vital signs and nursing notes reviewed.          LAB RESULTS  Recent Results (from the past 24 hour(s))   ECG 12 Lead ED Triage Standing Order; Chest Pain    Collection Time: 09/29/24 10:45 AM   Result Value Ref Range    QT Interval 389 ms    QTC Interval 479 ms   Comprehensive Metabolic Panel    Collection Time: 09/29/24 11:25 AM    Specimen: Blood   Result Value Ref Range    Glucose 100 (H) 65 - 99 mg/dL    BUN 16 6 - 20 mg/dL    Creatinine 1.02 0.76 - 1.27 mg/dL    Sodium 140 136 - 145 mmol/L    Potassium 3.9 3.5 - 5.2 mmol/L    Chloride 105 98 - 107 mmol/L    CO2 25.8 22.0 - 29.0 mmol/L    Calcium 8.5 (L) 8.6 - 10.5 mg/dL    Total Protein 6.4 6.0 - 8.5 g/dL    Albumin 3.7 3.5 - 5.2 g/dL    ALT (SGPT) 11 1 - 41 U/L    AST (SGOT) 13 1 - 40 U/L    Alkaline Phosphatase 89 39 - 117 U/L    Total Bilirubin 0.5 0.0 - 1.2 mg/dL    Globulin 2.7 gm/dL    A/G Ratio 1.4 g/dL    BUN/Creatinine Ratio 15.7 7.0 - 25.0    Anion Gap 9.2 5.0 - 15.0 mmol/L    eGFR 87.3 >60.0 mL/min/1.73   High Sensitivity Troponin T    Collection Time: 09/29/24 11:25 AM    Specimen: Blood   Result Value Ref Range    HS Troponin T 30 (H) <22 ng/L   Green Top (Gel)    Collection Time: 09/29/24 11:25 AM   Result Value Ref Range    Extra Tube Hold for add-ons.    Lavender Top    Collection Time: 09/29/24 11:25 AM   Result Value Ref Range    Extra Tube hold for add-on    Gold Top - SST    Collection Time: 09/29/24 11:25 AM   Result Value Ref Range    Extra Tube Hold for add-ons.    Light Blue Top    Collection Time: 09/29/24 11:25 AM   Result Value Ref Range    Extra Tube Hold for add-ons.    CBC Auto Differential    Collection Time: 09/29/24 11:25 AM    Specimen: Blood   Result Value Ref Range    WBC 9.06 3.40 - 10.80 10*3/mm3    RBC 4.10 (L) 4.14 - 5.80 10*6/mm3     Hemoglobin 12.6 (L) 13.0 - 17.7 g/dL    Hematocrit 37.7 37.5 - 51.0 %    MCV 92.0 79.0 - 97.0 fL    MCH 30.7 26.6 - 33.0 pg    MCHC 33.4 31.5 - 35.7 g/dL    RDW 12.7 12.3 - 15.4 %    RDW-SD 42.3 37.0 - 54.0 fl    MPV 9.5 6.0 - 12.0 fL    Platelets 173 140 - 450 10*3/mm3    Neutrophil % 74.0 42.7 - 76.0 %    Lymphocyte % 12.4 (L) 19.6 - 45.3 %    Monocyte % 10.5 5.0 - 12.0 %    Eosinophil % 2.6 0.3 - 6.2 %    Basophil % 0.3 0.0 - 1.5 %    Immature Grans % 0.2 0.0 - 0.5 %    Neutrophils, Absolute 6.70 1.70 - 7.00 10*3/mm3    Lymphocytes, Absolute 1.12 0.70 - 3.10 10*3/mm3    Monocytes, Absolute 0.95 (H) 0.10 - 0.90 10*3/mm3    Eosinophils, Absolute 0.24 0.00 - 0.40 10*3/mm3    Basophils, Absolute 0.03 0.00 - 0.20 10*3/mm3    Immature Grans, Absolute 0.02 0.00 - 0.05 10*3/mm3    nRBC 0.0 0.0 - 0.2 /100 WBC       Ordered the above labs and reviewed the results.        RADIOLOGY  XR Chest 1 View    Result Date: 9/29/2024  XR CHEST 1 VW-  INDICATION: Chest pain  COMPARISON: CT chest 4/10/2024 and radiographs dating back to 11/15/2020      No focal consolidation. No pleural effusion or pneumothorax. Stable cardiac silhouette at the upper limits of normal.  No focal osseous abnormality.   This report was finalized on 9/29/2024 11:05 AM by Dr. Sean Gonzalez M.D on Workstation: [id number]       Ordered the above noted radiological studies. Reviewed by me in PACS.            PROCEDURES  Procedures        OUTPATIENT MEDICATION MANAGEMENT:  Current Facility-Administered Medications Ordered in Epic   Medication Dose Route Frequency Provider Last Rate Last Admin    aspirin tablet 325 mg  325 mg Oral Once Ranjeet Baxter MD        sodium chloride 0.9 % flush 10 mL  10 mL Intravenous PRN Ranjeet Baxter MD         Current Outpatient Medications Ordered in Epic   Medication Sig Dispense Refill    carvedilol (COREG) 3.125 MG tablet Take 1 tablet by mouth 2 (Two) Times a Day With Meals. 60 tablet 0    losartan (COZAAR) 25 MG  tablet Take 1 tablet by mouth Daily. NEEDS AN APPOINTMENT FOR FUTURE REFILLS 30 tablet 0    sucralfate (CARAFATE) 1 g tablet Take 1 tablet by mouth 4 (Four) Times a Day. 16 tablet 0           MEDICATIONS GIVEN IN ER  Medications   sodium chloride 0.9 % flush 10 mL (has no administration in time range)   aspirin tablet 325 mg ( Oral Canceled Entry 9/29/24 1102)   furosemide (LASIX) injection 80 mg (80 mg Intravenous Given 9/29/24 1125)                   MEDICAL DECISION MAKING, PROGRESS, and CONSULTS    All labs have been independently reviewed by me.  All radiology studies have been reviewed by me and I have also reviewed the radiology report.   EKG's independently viewed and interpreted by me.  Discussion below represents my analysis of pertinent findings related to patient's condition, differential diagnosis, treatment plan and final disposition.      Additional sources:    - Discussed/ obtained information from independent historians: Spouse at bedside    - External (non-ED) record review: Patient was seen at another ED yesterday.  CTA of the chest was performed and there was no evidence of PE, aortic dissection/aneurysm, or pericardial effusion.  There was mild pulmonary edema and trace right greater than left pleural effusions.  proBNP was 688.  Troponin 0.054. Patient was admitted here last month for acute appendicitis which was treated nonoperatively. Echocardiogram done at that time showed an EF of 21 to 25% with moderate mitral regurgitation.Cardiac cath done in November 2020 showed severe dilated cardiomyopathy with an EF of 10%.  Coronary arteries were angiographically normal.    -Prescription drug monitoring program review:     N/A    - Chronic or social conditions impacting patient care (Social Determinants of Health): Health Care System (Health Coverage, Provider Availability, Provider Linguistic and Cultural Competency, Quality of Care)  Patient does not currently have a primary care provider or  health insurance.        Orders placed during this visit:  Orders Placed This Encounter   Procedures    XR Chest 1 View    [place] Draw    Comprehensive Metabolic Panel    High Sensitivity Troponin T    CBC Auto Differential    High Sensitivity Troponin T 2Hr    BNP    NPO Diet NPO Type: Strict NPO    Undress & Gown    Continuous Pulse Oximetry    Oxygen Therapy- Nasal Cannula; Titrate 1-6 LPM Per SpO2; 90 - 95%    ECG 12 Lead ED Triage Standing Order; Chest Pain    ECG 12 Lead ED Triage Standing Order; Chest Pain    Insert Peripheral IV    CBC & Differential    Green Top (Gel)    Lavender Top    Gold Top - SST    Light Blue Top         Additional orders considered but not ordered:          Differential diagnosis includes, but is not limited to:    Differential diagnosis includes but is not limited to CHF, acute coronary syndrome, pulmonary embolism, pneumothorax, pneumonia, asthma/COPD, deconditioning, anemia, anxiety.         Independent interpretation of labs, radiology studies, and discussions with consultants:  ED Course as of 09/29/24 1243   Sun Sep 29, 2024   1100 Temp: 97 °F (36.1 °C) [WH]   1100 Heart Rate: 93 [WH]   1100 Resp: 16 [WH]   1100 SpO2: 96 % [WH]   1100 Device (Oxygen Therapy): room air [WH]   1106 Chest x-ray personally interpreted by me.  My personal interpretation is: Heart size is normal.  Lungs are clear.  No pleural effusion. [WH]   1110 Patient presents to the ED complaining of cough, shortness of breath, and chest tightness.  He was seen at another ED yesterday and found to have acute CHF.  Admission was recommended but he left after having a disagreement with the physician.  Here today, he is not hypoxic.  He has some faint bibasilar rales.  Chest x-ray is negative acute but CTA yesterday showed evidence of mild pulmonary edema and small bilateral pleural effusions.  Will obtain labs for further evaluation.  Patient will be given a dose of Lasix.  Suspect he will ultimately need to be  admitted. [WH]   1112 EKG personally interpreted by me at 10:55 AM.  My personal interpretation is:          EKG time: 10:45 AM  Rhythm/Rate: Sinus rhythm, rate 91  P waves and RI: LAE  QRS, axis: LAD, anterior Q waves  ST and T waves: Nonspecific ST/T wave changes in the lateral leads    Interpreted Contemporaneously by me, independently viewed  EKG is not significantly changed compared to prior EKG done on 8/25/2024   [WH]   1204 HS Troponin T(!): 30 [WH]   1204 Glucose(!): 100 [WH]   1204 Creatinine: 1.02 [WH]   1204 WBC: 9.06 [WH]   1204 Hemoglobin(!): 12.6 [WH]   1205 BP(!): 168/108  Patient says he took his blood pressure medications this morning.  He has been given Lasix.  Will continue to monitor blood pressure and hold off on additional medications at this time. [WH]   1237 Test results and diagnosis were discussed with the patient and his wife.  Shared decision making was discussed and admission was recommended.  He is agreeable with this. [WH]   1239 Case discussed with VALENTINO Vega, and he agrees to admit the patient to Dr. Henderson.  Pertinent history, exam findings, test results, ED course, and diagnoses were discussed with him. [WH]   1242 MDM: Patient presented to the ED complaining of shortness of breath, cough, and intermittent chest discomfort.  He has a history of CHF.  He was seen at another ED yesterday and workup showed evidence of acute congestive heart failure.  Here today, EKG does not have any acute ischemic changes.  Troponin is 30.  His chest pain is atypical.  Cardiac cath done several years ago showed normal coronaries but severe LV dysfunction.  Patient was given IV Lasix.  He will be admitted for further evaluation/treatment. [WH]      ED Course User Index  [WH] Ranjeet Baxter MD         COMPLEXITY OF CARE  The patient requires admission.      DIAGNOSIS  Final diagnoses:   Acute systolic congestive heart failure   Chest tightness   Poorly-controlled hypertension          DISPOSITION  ADMISSION    Discussed treatment plan and reason for admission with pt/family and admitting physician.  Pt/family voiced understanding of the plan for admission for further testing/treatment as needed.               Latest Documented Vital Signs:  AS OF 12:43 EDT VITALS:    BP - (!) 168/108  HR - 98  TEMP - 97 °F (36.1 °C) (Tympanic)  O2 SATS - 95%            --    Please note that portions of this were completed with a voice recognition program.       Note Disclaimer: At [facility], we believe that sharing information builds trust and better relationships. You are receiving this note because you are receiving care at [facility] or recently visited. It is possible you will see health information before a provider has talked with you about it. This kind of information can be easy to misunderstand. To help you fully understand what it means for your health, we urge you to discuss this note with your provider.             Ranjeet Baxter MD  09/29/24 7994

## 2024-09-29 NOTE — CASE MANAGEMENT/SOCIAL WORK
Discharge Planning Assessment  [facility]     Patient Name: Colten Hager  MRN: [id number]  Today's Date: 9/29/2024    Admit Date: 9/29/2024        Discharge Needs Assessment    No documentation.                  Discharge Plan       Row Name 09/29/24 9194       Plan    Plan Comments Spoke with patient and spouse at bedside, per consult request, regarding insurance concerns and lack of primary care provider. I provided patient with a list of [facility] and explained that their services are income based, and that they also accept Medicaid. Patient states that he has no insurance, though on our records he has Medicaid pending. Patient and wife give permission to reach out to [employer] [employer] to have patient screened for Medicaid eligibility and/or see if patient is currently pending for Medicaid. Explained to them that [employer] will reach out to them and they vocalize understanding. EDDY GeN RN                  Continued Care and Services - Admitted Since 9/29/2024    No active coordination exists for this encounter.          Demographic Summary    No documentation.                  Functional Status    No documentation.                  Psychosocial    No documentation.                  Abuse/Neglect    No documentation.                  Legal    No documentation.                  Substance Abuse    No documentation.                  Patient Forms    No documentation.                     Camilo Sevilla, RN

## 2024-09-29 NOTE — ED NOTES
Pt is soa x 2 weeks.  He has had a cough.  He was seen at [place] 2 days ago and had cxr and lab work.  He left there as he had a disagreement with the dr.  His troponin and bnp levels were high.  He continues to feel like he can't breathe well.  He has cp

## 2024-09-29 NOTE — PLAN OF CARE
Goal Outcome Evaluation:  Plan of Care Reviewed With: patient        Progress: no change  Outcome Evaluation: Patient is alert and oriented times 4. On room air. Bp slightly elevated. On room air. Patient admitted for shortness of air and congestive heart failure. Cardiology following Up ad oliva with activity. Patient received IV lasix in the ER.Patient educated on smoking cessation. Questions and concerns addressed. Plan of care ongoing. Patient is agreeable with plan of care.

## 2024-09-29 NOTE — H&P
[facility]   HISTORY AND PHYSICAL    Patient Name: Colten Hager  : 1970  MRN: [id number]  Primary Care Physician:  Provider, No Known  Date of admission: 2024    Subjective   Subjective     Chief Complaint:   Chief Complaint   Patient presents with    Shortness of Breath         HPI:    Colten Hager is a 53 y.o. male with significant past medical history of CHF, hypertension, hyperlipidemia who presented to the ED complaining of acute shortness of breath and cough.  Symptoms began roughly 2 weeks ago.  Cough said to be productive with clear sputum.  Shortness of breath is worse with exertion.  He also reports random nonexertional intermittent chest tightness.  Chest pain does not radiate.  No nausea vomiting abdominal pain.  Patient denies significant recent weight gain, leg swelling or pedal edema.  He states he ran out of his Lasix several weeks ago and has been unable to get refilled.  He was seen in another emergency department yesterday and found to have CHF.  Admission was recommended but he left on account of disagreement with medical staff.  Patient is here for further evaluation.    Review of the patient's chart he had a TTE echo on 2024 that showed a severely dilated left ventricular cavity, severe global hypokinesis, LVEF estimated between 21 and 25%.  Grade 2 left ventricular diastolic dysfunction.  Normal right ventricular cavity and systolic function.  Left atrial cavity moderately dilated.  Mild to moderate mitral valve recharge.  Mild tricuspid valve regurg.  No pericardial effusion.    In the ED today's plan chest radiograph showed no focal consolidation or pleural effusion.  On the CTA scan done at [facility] yesterday there was no PE but there was mild pulmonary edema including septal line thickening and groundglass opacities as well as trace pleural effusions.  Cardiomegaly and coronary artery atherosclerosis was noted.  In the ED today the patient's BNP was  1357.  Patient's high-sensitivity troponin was 30 which was 51 and 59 on 8/25/2024.  Sodium 140, potassium 3.9, creatinine 1.02, GFR 87.3.  Hemoglobin was 12.6 which appears to be close to baseline, WBC 9.06, platelets 173    Review of Systems   All systems were reviewed and negative except for: That listed above    Personal History     Past Medical History:   Diagnosis Date    Cellulitis     CHF (congestive heart failure)     GERD (gastroesophageal reflux disease)     Hyperlipidemia     Hypertension        Past Surgical History:   Procedure Laterality Date    CARDIAC CATHETERIZATION N/A 11/16/2020    Procedure: Left Heart Cath;  Surgeon: Fletcher Mcmanus MD;  Location: [facility] CATH INVASIVE LOCATION;  Service: Cardiovascular;  Laterality: N/A;    CARDIAC CATHETERIZATION N/A 11/16/2020    Procedure: Coronary angiography;  Surgeon: Fletcher Mcmanus MD;  Location: [facility] CATH INVASIVE LOCATION;  Service: Cardiovascular;  Laterality: N/A;    CARDIAC CATHETERIZATION N/A 11/16/2020    Procedure: Left ventriculography;  Surgeon: Fletcher Mcmanus MD;  Location: [facility] CATH INVASIVE LOCATION;  Service: Cardiovascular;  Laterality: N/A;       Family History: family history is not on file. Otherwise pertinent FHx was reviewed and not pertinent to current issue.    Social History:  reports that he has been smoking cigarettes. He has never used smokeless tobacco. He reports current drug use. Drug: Methamphetamines. He reports that he does not drink alcohol.    Home Medications:  acetaminophen, carvedilol, losartan, and sucralfate    Allergies:  No Known Allergies    Objective   Objective     Vitals:   Temp:  [97 °F (36.1 °C)-97.6 °F (36.4 °C)] 97.5 °F (36.4 °C)  Heart Rate:  [84-98] 84  Resp:  [16-18] 18  BP: (103-168)/() 146/97  Physical Exam    Constitutional: Awake, alert   Eyes: PERRLA, sclerae anicteric, no conjunctival injection   HENT: NCAT, mucous membranes moist   Neck: Supple, no thyromegaly, no  lymphadenopathy, trachea midline   Respiratory: Clear to auscultation bilaterally, nonlabored respirations    Cardiovascular: RRR, no murmurs, rubs, or gallops, palpable pedal pulses bilaterally   Gastrointestinal: Positive bowel sounds, soft, nontender, nondistended   Musculoskeletal: No bilateral ankle edema, no clubbing or cyanosis to extremities   Psychiatric: Appropriate affect, cooperative   Neurologic: Oriented x 3, strength symmetric in all extremities, Cranial Nerves grossly intact to confrontation, speech clear   Skin: No rashes     Result Review    Result Review:  I have personally reviewed the results from the time of this admission to 9/29/2024 17:19 EDT and agree with these findings:  [x]  Laboratory list / accordion  []  Microbiology  [x]  Radiology  [x]  EKG/Telemetry   []  Cardiology/Vascular   []  Pathology  []  Old records  []  Other:        Assessment & Plan   Assessment / Plan     Brief Patient Summary:  Colten Hager is a 53 y.o. male who was admitted to the ED observation unit for an acute CHF exacerbation.  BNP was elevated in the ED today.  Patient reporting exertional dyspnea.  CTA of the chest performed at [facility] yesterday evening shows small pleural effusions and signs of CHF.  Patient given 80 of Lasix in the ED and admitted to the ED observation unit for cardiology consultation.  Patient has been out of his Lasix for a few weeks leading up to this exacerbation.    Active Hospital Problems:  Active Hospital Problems    Diagnosis     **CHF (congestive heart failure)      Plan:     CHF/noncompliance with medication  -Hold on echo as patient had an echo 1 month ago.  -Continue IV Lasix.  40 mg twice daily starting tomorrow  -Morning CBC, BMP, BNP  -Continuous cardiac monitoring and pulse ox  -Strict I&O's  -Cardiac low-salt diet  -Cardiology following with plan for reevaluation tomorrow and probable continued diuresis with Lasix    Hypertension  -Continue with home carvedilol and  losartan        VTE Prophylaxis:  Mechanical VTE prophylaxis orders are present.        CODE STATUS:    Level Of Support Discussed With: Patient  Code Status (Patient has no pulse and is not breathing): CPR (Attempt to Resuscitate)  Medical Interventions (Patient has pulse or is breathing): Full Support    Admission Status:  I believe this patient meets observation status.    Electronically signed by Madhu Chi III, PA, 09/29/24, 1:39 PM EDT.        75 minutes has been spent by [facility] providers in the care of this patient while under observation status      I have worn appropriate PPE during this patient encounter, sanitized my hands both with entering and exiting patient's room.    I have discussed plan of care with patient including advance care plan and/or surrogate decision maker.  Patient advises that their spouse/Lizeth Wise will be their primary surrogate decision maker

## 2024-09-29 NOTE — ED NOTES
.Nursing report ED to floor  Colten Hager  53 y.o.  male    HPI :  HPI (Adult)  Stated Reason for Visit: cp, soa  History Obtained From: patient    Chief Complaint  Chief Complaint   Patient presents with    Shortness of Breath       Admitting doctor:   Sean Henderson MD    Admitting diagnosis:   The primary encounter diagnosis was Acute systolic congestive heart failure. Diagnoses of Chest tightness and Poorly-controlled hypertension were also pertinent to this visit.    Code status:   Current Code Status       Date Active Code Status Order ID Comments User Context       Prior            Allergies:   Patient has no known allergies.    Isolation:   No active isolations    Intake and Output  No intake or output data in the 24 hours ending 09/29/24 1255    Weight:   There were no vitals filed for this visit.    Most recent vitals:   Vitals:    09/29/24 1113 09/29/24 1133 09/29/24 1148 09/29/24 1201   BP:  (!) 156/109  (!) 168/108   Pulse: 89 92 96 98   Resp:       Temp:       TempSrc:       SpO2: 97% 92% 94% 95%       Active LDAs/IV Access:   Lines, Drains & Airways       Active LDAs       Name Placement date Placement time Site Days    Peripheral IV 09/29/24 1125 Anterior;Distal;Right;Upper Arm 09/29/24  1125  Arm  less than 1                    Labs (abnormal labs have a star):   Labs Reviewed   COMPREHENSIVE METABOLIC PANEL - Abnormal; Notable for the following components:       Result Value    Glucose 100 (*)     Calcium 8.5 (*)     All other components within normal limits    Narrative:     GFR Normal >60  Chronic Kidney Disease <60  Kidney Failure <15     TROPONIN - Abnormal; Notable for the following components:    HS Troponin T 30 (*)     All other components within normal limits    Narrative:     High Sensitive Troponin T Reference Range:  <14.0 ng/L- Negative Female for AMI  <22.0 ng/L- Negative Male for AMI  >=14 - Abnormal Female indicating possible myocardial injury.  >=22 - Abnormal Male indicating  possible myocardial injury.   Clinicians would have to utilize clinical acumen, EKG, Troponin, and serial changes to determine if it is an Acute Myocardial Infarction or myocardial injury due to an underlying chronic condition.        CBC WITH AUTO DIFFERENTIAL - Abnormal; Notable for the following components:    RBC 4.10 (*)     Hemoglobin 12.6 (*)     Lymphocyte % 12.4 (*)     Monocytes, Absolute 0.95 (*)     All other components within normal limits   RAINBOW DRAW    Narrative:     The following orders were created for panel order [place] Draw.  Procedure                               Abnormality         Status                     ---------                               -----------         ------                     Green Top (Gel)[[id number]]                                  Final result               Lavender Top[[id number]]                                     Final result               Gold Top - SST[[id number]]                                   Final result               Light Blue Top[[id number]]                                   Final result                 Please view results for these tests on the individual orders.   HIGH SENSITIVITIY TROPONIN T 2HR   BNP (IN-HOUSE)   CBC AND DIFFERENTIAL    Narrative:     The following orders were created for panel order CBC & Differential.  Procedure                               Abnormality         Status                     ---------                               -----------         ------                     CBC Auto Differential[[id number]]        Abnormal            Final result                 Please view results for these tests on the individual orders.   GREEN TOP   LAVENDER TOP   GOLD TOP - SST   LIGHT BLUE TOP       EKG:   ECG 12 Lead ED Triage Standing Order; Chest Pain   Preliminary Result   HEART RATE=91  bpm   RR Ywsbtqwt=873  ms   SD Hsosxrxp=544  ms   P Horizontal Axis=-12  deg   P Front Axis=53  deg   QRSD Interval=98  ms   QT Fnjjluqv=265  ms   ZSwC=960   ms   QRS Axis=-19  deg   T Wave Axis=87  deg   - ABNORMAL ECG -   Sinus rhythm   Left atrial enlargement   Borderline left axis deviation   Anteroseptal infarct, old   Baseline wander in lead(s) V3   Date and Time of Study:2024-09-29 10:45:09          Meds given in ED:   Medications   sodium chloride 0.9 % flush 10 mL (has no administration in time range)   aspirin tablet 325 mg ( Oral Canceled Entry 9/29/24 1102)   furosemide (LASIX) injection 80 mg (80 mg Intravenous Given 9/29/24 1125)       Imaging results:  XR Chest 1 View    Result Date: 9/29/2024  No focal consolidation. No pleural effusion or pneumothorax. Stable cardiac silhouette at the upper limits of normal.  No focal osseous abnormality.   This report was finalized on 9/29/2024 11:05 AM by Dr. Sean Gonzalez M.D on Workstation: [email or web address]       Ambulatory status:   - Standby    Social issues:   Social History     Socioeconomic History    Marital status:    Tobacco Use    Smoking status: Every Day     Current packs/day: 1.00     Types: Cigarettes    Smokeless tobacco: Never   Vaping Use    Vaping status: Never Used   Substance and Sexual Activity    Alcohol use: No    Drug use: Yes     Types: Methamphetamines     Comment: last iv meth use 8/23/2024    Sexual activity: Defer       Peripheral Neurovascular  Peripheral Neurovascular (Adult)  Peripheral Neurovascular WDL: WDL    Neuro Cognitive  Neuro Cognitive (Adult)  Cognitive/Neuro/Behavioral WDL: WDL    Learning  Learning Assessment (Adult)  Learning Readiness and Ability: no barriers identified  Education Provided  Person Taught: patient    Respiratory  Respiratory WDL  Respiratory WDL: .WDL except, rhythm/pattern  Rhythm/Pattern, Respiratory: shortness of breath    Abdominal Pain       Pain Assessments  Pain (Adult)  (0-10) Pain Rating: Rest: 8    Bharat Perry RN  09/29/24 12:55 EDT

## 2024-09-29 NOTE — CONSULTS
Date of Consultation: 24    Referral Provider: Ranjeet Baxter MD     Reason for Consultation: CHF    Encounter Provider: DIANA Blackwell    Group of Service: [facility]     Patient Name: Colten Hager    :1970    Chief complaint:  Shortness of breath    History of Present Illness:  Colten Hager is a 53 year old who has a past medical history that is significant for HFrEF, GERD, hyperlipidemia, hypertension, current IV drug use, current tobacco use, and DVT.     He was recently admitted in 2024 with abdominal pain and there was concern he had appendicitis. We saw him for preoperative clearance and obtained an echocardiogram. This showed severely decreased left ventricular systolic function with an EF of 21 to 25%, severe global hypokinesis, grade 2 diastolic dysfunction, severely dilated left ventricle, mild to moderate mitral valve regurgitation, and mild tricuspid valve regurgitation.  He did not end up requiring surgery.  He was discharged on carvedilol, Lasix, and losartan.     After his hospitalization he ran out of furosemide and has not taken this in several weeks.  He reports compliance with his carvedilol and losartan.  He reports that about 2 weeks ago he developed shortness of breath and a cough.  The shortness of breath is progressively worsened over the last 2 weeks.  It is particularly worse with exertion or when he lays flat.  He does have associated chest tightness with the shortness of breath.  He denies palpitations.  His cough is persistent, and productive with clear sputum.  He denies significant changes in his weight or the development of swelling.  He denies any recent illness, sick contacts, fever, chills, nausea, vomiting, diarrhea.    He is a current smoker, reports 1 pack/day.  He is also a current IV drug user, he reports to me that he is trying to cut down on his use.  He reports that he uses methamphetamines every 3 to 4 days, with his last  use being yesterday.    Workup has included: HS troponin 30 then 27.  proBNP 1357.  Creatinine 1.02.  White blood cell count 9.06.  Hemoglobin 12.6.  Chest x-ray with no acute findings.  EKG showed sinus rhythm with left atrial enlargement, rate 91 bpm.  He received 80 mg of IV furosemide in the ED with good response.    On exam he is resting in bed with his significant other at bedside.  He reports that his breathing has improved after IV diuretics.  He currently feels his cough is his most bothersome symptom.  He denies chest pain or discomfort.    Echocardiogram 8/25/24    The left ventricular cavity is severely dilated.    There is severe global hypokinesis. Best antwon segment appears to be the septum    Left ventricular systolic function is severely decreased. Left ventricular ejection fraction appears to be 21 - 25%.    Left ventricular diastolic function is consistent with (grade II w/high LAP) pseudonormalization.    Normal right ventricular cavity size and systolic function noted.    The left atrial cavity is moderately dilated.    Mild to moderate mitral valve regurgitation is present.    Mild tricuspid valve regurgitation is present    Calculated right ventricular systolic pressure from tricuspid regurgitation is 33 mmHg.    There is no evidence of pericardial effusion       Past Medical History:   Diagnosis Date    Cellulitis     CHF (congestive heart failure)     GERD (gastroesophageal reflux disease)     Hyperlipidemia     Hypertension          Past Surgical History:   Procedure Laterality Date    CARDIAC CATHETERIZATION N/A 11/16/2020    Procedure: Left Heart Cath;  Surgeon: Fletcher Mcmanus MD;  Location: [facility] INVASIVE LOCATION;  Service: Cardiovascular;  Laterality: N/A;    CARDIAC CATHETERIZATION N/A 11/16/2020    Procedure: Coronary angiography;  Surgeon: Fletcher Mcmanus MD;  Location: [facility] INVASIVE LOCATION;  Service: Cardiovascular;  Laterality: N/A;    CARDIAC  "CATHETERIZATION N/A 11/16/2020    Procedure: Left ventriculography;  Surgeon: Fletcher Mcmanus MD;  Location: [facility] CATH INVASIVE LOCATION;  Service: Cardiovascular;  Laterality: N/A;         No Known Allergies      No current facility-administered medications on file prior to encounter.     Current Outpatient Medications on File Prior to Encounter   Medication Sig Dispense Refill    acetaminophen (TYLENOL) 325 MG tablet Take 2 tablets by mouth Every 6 (Six) Hours As Needed for Mild Pain.      carvedilol (COREG) 3.125 MG tablet Take 1 tablet by mouth 2 (Two) Times a Day With Meals. 60 tablet 0    losartan (COZAAR) 25 MG tablet Take 1 tablet by mouth Daily. NEEDS AN APPOINTMENT FOR FUTURE REFILLS 30 tablet 0    sucralfate (CARAFATE) 1 g tablet Take 1 tablet by mouth 4 (Four) Times a Day. 16 tablet 0         Social History     Socioeconomic History    Marital status:    Tobacco Use    Smoking status: Every Day     Current packs/day: 1.00     Types: Cigarettes    Smokeless tobacco: Never   Vaping Use    Vaping status: Never Used   Substance and Sexual Activity    Alcohol use: No    Drug use: Yes     Types: Methamphetamines     Comment: last iv meth use 8/23/2024    Sexual activity: Defer         History reviewed. No pertinent family history.    REVIEW OF SYSTEMS:   12 point ROS was performed and is negative except as outlined in HPI       Objective:     Vitals:    09/29/24 1201 09/29/24 1300 09/29/24 1328 09/29/24 1513   BP: (!) 168/108 142/92 103/66 146/97   BP Location:   Left arm Right arm   Patient Position:   Lying Lying   Pulse: 98 87 85 84   Resp:   18 18   Temp:   97.6 °F (36.4 °C) 97.5 °F (36.4 °C)   TempSrc:   Tympanic Tympanic   SpO2: 95% 95% 96% 96%   Weight:   80.8 kg (178 lb 3.2 oz)    Height:   175.3 cm (69\")      Body mass index is 26.32 kg/m².  Flowsheet Rows      Flowsheet Row First Filed Value   Admission Height 175.3 cm (69\") Documented at 09/29/2024 1328   Admission Weight 80.8 kg (178 " lb 3.2 oz) Documented at 09/29/2024 1328              Physical Exam  Vitals reviewed.   Constitutional:       General: He is not in acute distress.  HENT:      Head: Normocephalic.      Nose: Nose normal.   Eyes:      Extraocular Movements: Extraocular movements intact.      Pupils: Pupils are equal, round, and reactive to light.   Cardiovascular:      Rate and Rhythm: Normal rate and regular rhythm.      Pulses: Normal pulses.      Heart sounds: Normal heart sounds. Heart sounds not distant. No murmur heard.     No friction rub. No gallop. No S3 or S4 sounds.   Pulmonary:      Effort: Pulmonary effort is normal.      Breath sounds: Normal breath sounds.   Abdominal:      General: Abdomen is flat. Bowel sounds are normal.      Palpations: Abdomen is soft.      Tenderness: There is no abdominal tenderness.   Skin:     General: Skin is warm and dry.   Neurological:      General: No focal deficit present.      Mental Status: He is alert and oriented to person, place, and time. Mental status is at baseline.   Psychiatric:         Mood and Affect: Mood normal.         Behavior: Behavior normal.         Lab Review:                Results from last 7 days   Lab Units 09/29/24  1125   SODIUM mmol/L 140   POTASSIUM mmol/L 3.9   CHLORIDE mmol/L 105   CO2 mmol/L 25.8   BUN mg/dL 16   CREATININE mg/dL 1.02   GLUCOSE mg/dL 100*   CALCIUM mg/dL 8.5*     Results from last 7 days   Lab Units 09/29/24  1349 09/29/24  1125 09/28/24  0925 09/28/24  0725   TROPONIN I ng/mL  --   --  0.054* 0.058*   HSTROP T ng/L 27* 30*  --   --      Results from last 7 days   Lab Units 09/29/24  1125   WBC 10*3/mm3 9.06   HEMOGLOBIN g/dL 12.6*   HEMATOCRIT % 37.7   PLATELETS 10*3/mm3 173     Results from last 7 days   Lab Units 09/28/24  0803   INR INR 1.1   APTT s 31.9                   EKG (reviewed by me personally):            Assessment/Plan:   Acute on chronic HFrEF  Exacerbation secondary to medication noncompliance.  He reports that he ran out  of his Lasix several weeks ago and has not been able to have this refilled.  He has had progressively worsening shortness of breath over the last 2 weeks.  proBNP elevated.  He had a [facility] at [facility] 2 days ago that showed mild pulmonary edema, chest x-ray does not show focal consolidation or pleural effusion here.   Agree with IV diuresis, he received 80 mg of IV furosemide in the ED today with good response.  Will likely require further IV diuretics tomorrow but will reassess volume status in the morning.  Echocardiogram on 8/25/2024 showed LVEF 21 to 25%, grade 2 left ventricular diastolic function, severe global hypokinesis.  Continue carvedilol and losartan  Hypertension  Continue carvedilol and losartan  Hyperlipidemia  Current IV drug use   We had a ivory discussion about his use.  He is trying to cut down and has been able to go from using daily to using every 3 to 4 days.  I encouraged him to stop use completely. His last use of methamphetamines was yesterday per his report.   Current tobacco use  He smokes 1 ppd. Encouraged cessation.     Cardiology will follow, thank you for this consult.    Erika Barrera, APRN   09/29/24

## 2024-09-30 ENCOUNTER — READMISSION MANAGEMENT (OUTPATIENT)
Dept: CALL CENTER | Facility: HOSPITAL | Age: 54
End: 2024-09-30
Payer: MEDICAID

## 2024-09-30 VITALS
HEIGHT: 69 IN | SYSTOLIC BLOOD PRESSURE: 154 MMHG | WEIGHT: 178.2 LBS | TEMPERATURE: 98.3 F | DIASTOLIC BLOOD PRESSURE: 77 MMHG | OXYGEN SATURATION: 96 % | HEART RATE: 78 BPM | BODY MASS INDEX: 26.39 KG/M2 | RESPIRATION RATE: 18 BRPM

## 2024-09-30 LAB
ANION GAP SERPL CALCULATED.3IONS-SCNC: 8.3 MMOL/L (ref 5–15)
BUN SERPL-MCNC: 18 MG/DL (ref 6–20)
BUN/CREAT SERPL: 18.8 (ref 7–25)
CALCIUM SPEC-SCNC: 9.1 MG/DL (ref 8.6–10.5)
CHLORIDE SERPL-SCNC: 104 MMOL/L (ref 98–107)
CO2 SERPL-SCNC: 26.7 MMOL/L (ref 22–29)
CREAT SERPL-MCNC: 0.96 MG/DL (ref 0.76–1.27)
DEPRECATED RDW RBC AUTO: 43.3 FL (ref 37–54)
EGFRCR SERPLBLD CKD-EPI 2021: 93.9 ML/MIN/1.73
ERYTHROCYTE [DISTWIDTH] IN BLOOD BY AUTOMATED COUNT: 12.8 % (ref 12.3–15.4)
GLUCOSE SERPL-MCNC: 103 MG/DL (ref 65–99)
HCT VFR BLD AUTO: 40.5 % (ref 37.5–51)
HGB BLD-MCNC: 13.5 G/DL (ref 13–17.7)
MCH RBC QN AUTO: 30.8 PG (ref 26.6–33)
MCHC RBC AUTO-ENTMCNC: 33.3 G/DL (ref 31.5–35.7)
MCV RBC AUTO: 92.3 FL (ref 79–97)
NT-PROBNP SERPL-MCNC: 1083 PG/ML (ref 0–900)
PLATELET # BLD AUTO: 174 10*3/MM3 (ref 140–450)
PMV BLD AUTO: 9.5 FL (ref 6–12)
POTASSIUM SERPL-SCNC: 3.7 MMOL/L (ref 3.5–5.2)
RBC # BLD AUTO: 4.39 10*6/MM3 (ref 4.14–5.8)
SODIUM SERPL-SCNC: 139 MMOL/L (ref 136–145)
WBC NRBC COR # BLD AUTO: 7.07 10*3/MM3 (ref 3.4–10.8)

## 2024-09-30 PROCEDURE — 85027 COMPLETE CBC AUTOMATED: CPT | Performed by: PHYSICIAN ASSISTANT

## 2024-09-30 PROCEDURE — 99232 SBSQ HOSP IP/OBS MODERATE 35: CPT | Performed by: INTERNAL MEDICINE

## 2024-09-30 PROCEDURE — 83880 ASSAY OF NATRIURETIC PEPTIDE: CPT | Performed by: PHYSICIAN ASSISTANT

## 2024-09-30 PROCEDURE — 96376 TX/PRO/DX INJ SAME DRUG ADON: CPT

## 2024-09-30 PROCEDURE — G0378 HOSPITAL OBSERVATION PER HR: HCPCS

## 2024-09-30 PROCEDURE — 80048 BASIC METABOLIC PNL TOTAL CA: CPT | Performed by: PHYSICIAN ASSISTANT

## 2024-09-30 PROCEDURE — 25010000002 FUROSEMIDE PER 20 MG: Performed by: PHYSICIAN ASSISTANT

## 2024-09-30 RX ORDER — LOSARTAN POTASSIUM 25 MG/1
25 TABLET ORAL DAILY
Qty: 30 TABLET | Refills: 0 | Status: SHIPPED | OUTPATIENT
Start: 2024-09-30

## 2024-09-30 RX ORDER — CARVEDILOL 3.12 MG/1
3.12 TABLET ORAL EVERY 12 HOURS SCHEDULED
Qty: 60 TABLET | Refills: 0 | Status: SHIPPED | OUTPATIENT
Start: 2024-09-30

## 2024-09-30 RX ORDER — FUROSEMIDE 40 MG
40 TABLET ORAL 2 TIMES DAILY
Qty: 60 TABLET | Refills: 1 | Status: SHIPPED | OUTPATIENT
Start: 2024-09-30 | End: 2024-10-04

## 2024-09-30 RX ADMIN — CARVEDILOL 3.12 MG: 3.12 TABLET, FILM COATED ORAL at 08:43

## 2024-09-30 RX ADMIN — Medication 10 ML: at 08:48

## 2024-09-30 RX ADMIN — SUCRALFATE 1 G: 1 TABLET ORAL at 08:43

## 2024-09-30 RX ADMIN — FUROSEMIDE 40 MG: 10 INJECTION, SOLUTION INTRAMUSCULAR; INTRAVENOUS at 05:17

## 2024-09-30 RX ADMIN — LOSARTAN POTASSIUM 25 MG: 25 TABLET ORAL at 08:43

## 2024-09-30 NOTE — PROGRESS NOTES
[facility] Progress Note       Encounter Date:24  Patient:Colten Hager  :1970  MRN:[id number]      Chief Complaint: Follow-up congestive heart failure      Subjective:        Feeling a little bit better still short of breath.  Diuresing well    Review of Systems:  ROS    Medications:  Scheduled Meds:  aspirin, 325 mg, Oral, Once  carvedilol, 3.125 mg, Oral, BID With Meals  furosemide, 40 mg, Intravenous, Q12H  losartan, 25 mg, Oral, Daily  nicotine, 1 patch, Transdermal, Q24H  sodium chloride, 10 mL, Intravenous, Q12H  sucralfate, 1 g, Oral, 4x Daily    Continuous Infusions:   PRN Meds:    acetaminophen    benzocaine    senna-docusate sodium **AND** polyethylene glycol **AND** bisacodyl **AND** bisacodyl    melatonin    nicotine polacrilex    ondansetron ODT **OR** ondansetron    sodium chloride    sodium chloride    sodium chloride         Objective:       Vitals:    24 1900 24 2300 24 0407 24 0708   BP: 132/90 131/74 130/95 (!) 138/101   BP Location: Right arm Left arm Left arm Right arm   Patient Position: Lying Lying Lying Lying   Pulse: 78 84 83 81   Resp: 20 19 19 18   Temp: 98.8 °F (37.1 °C) 97.5 °F (36.4 °C) 98.4 °F (36.9 °C) 97.5 °F (36.4 °C)   TempSrc: Oral Oral Oral Tympanic   SpO2: 92% 98% 96% 97%   Weight:       Height:               Physical Exam:  Constitutional: Well appearing, well developed, no acute distress   HENT: Oropharynx clear and membrane moist  Eyes: Normal conjunctiva, no sclera icterus.  Neck: Supple, no carotid bruit bilaterally.  Cardiovascular: Regular rate and rhythm, No Murmur, No bilateral lower extremity edema.  Pulmonary: Normal respiratory effort, normal lung sounds, no wheezing.  Abdominal: Soft, nontender, no hepatosplenomegaly, liver is non-pulsatile.  Neurological: Alert and orient x 3.   Skin: Warm, dry, no ecchymosis, no rash.  Psych: Appropriate mood and affect. Normal judgment and insight.           Lab Review:  "  Results from last 7 days   Lab Units 09/30/24  0527 09/29/24  1125   SODIUM mmol/L 139 140   POTASSIUM mmol/L 3.7 3.9   CHLORIDE mmol/L 104 105   CO2 mmol/L 26.7 25.8   BUN mg/dL 18 16   CREATININE mg/dL 0.96 1.02   GLUCOSE mg/dL 103* 100*   CALCIUM mg/dL 9.1 8.5*   AST (SGOT) U/L  --  13   ALT (SGPT) U/L  --  11     Results from last 7 days   Lab Units 09/29/24  1349 09/29/24  1125 09/28/24  0925 09/28/24  0725   TROPONIN I ng/mL  --   --  0.054* 0.058*   HSTROP T ng/L 27* 30*  --   --      Results from last 7 days   Lab Units 09/30/24  0527 09/29/24  1125 09/28/24  0725   WBC 10*3/mm3 7.07 9.06 9.66   HEMOGLOBIN g/dL 13.5 12.6* 13.7   HEMATOCRIT % 40.5 37.7 41.6   PLATELETS 10*3/mm3 174 173 197     Results from last 7 days   Lab Units 09/28/24  0803   INR INR 1.1   APTT s 31.9               Invalid input(s): \"LDLCALC\"  Results from last 7 days   Lab Units 09/30/24  0527 09/29/24  1125   PROBNP pg/mL 1,083.0* 1,357.0*               Echocardiogram 8/25/24 with images reviewed by myself:  The left ventricular cavity is severely dilated.  There is severe global hypokinesis. Best antwon segment appears to be the septum  Left ventricular systolic function is severely decreased. Left ventricular ejection fraction appears to be 21 - 25%.  Left ventricular diastolic function is consistent with (grade II w/high LAP) pseudonormalization.  Normal right ventricular cavity size and systolic function noted.  The left atrial cavity is moderately dilated.  Mild to moderate mitral valve regurgitation is present.Mild tricuspid valve regurgitation is present  Calculated right ventricular systolic pressure from tricuspid regurgitation is 33 mmHg.  There is no evidence of pericardial effusion     Cardiac Catheterization 11/16/2020 with images reviewed by myself:  Normal coronary arteries  LVEDP 24 mmHg  LVEF 20%    Echocardiogram 11/15/2020:  Calculated left ventricular EF = 25% Estimated left ventricular EF was in agreement " with the calculated left ventricular EF. Left ventricular systolic function is severely decreased.  The left ventricular cavity is moderate to severely dilated.  The following left ventricular wall segments are hypokinetic: mid anterior, apical anterior, basal anterolateral, mid anterolateral, apical lateral, basal inferolateral, mid inferolateral, apical inferior, mid inferior, apical septal, basal inferoseptal, mid inferoseptal, apex hypokinetic, mid anteroseptal, basal anterior, basal inferior and basal inferoseptal.  Left ventricular diastolic function is consistent with (grade III w/high LAP) reversible restrictive pattern.  The right atrial cavity is moderately dilated.  Left atrial volume is moderately increased.  Mild aortic valve regurgitation is present.  Mild aortic valve regurgitation is present.  Mild to moderate tricuspid valve regurgitation is present  Estimated right ventricular systolic pressure from tricuspid regurgitation is mildly elevated (35-45 mmHg).       Assessment:          Diagnosis Plan   1. Acute systolic congestive heart failure        2. Chest tightness        3. Poorly-controlled hypertension               Plan:       Mr. Hager is a 53 y.o. gentleman with past medical history notable for nonischemic cardiomyopathy, hypertension, history of DVT, tobacco abuse, and ongoing methamphetamine use who presents to the emergency room with worsening shortness of breath and dyspnea on exertion.  He has been without Lasix for a number of weeks.  He reports that he did have access to his other medications possibly through our office may be through another nurse practitioner he had seen although somewhat questionable.  Nonetheless he has been restarted on his prior regimen.  Previously this is done a reasonable job with controlling his symptoms.  The main issue though is his ongoing methamphetamine use.  He admitted to using methamphetamine on 9/28.  Our emergency room did not get a drug screen not  sure why but nonetheless he does admit to using methamphetamines likely the reason for his ongoing and persistent cardiomyopathy and does limit options to better treat him most notably further consideration for primary prevention ICD which would not be safe to implant in somebody with ongoing methamphetamine use.  I think if he were to get off of drugs his heart would likely recover this is usually the case not impossible that he has not another reason for his cardiomyopathy or an intrinsic nonischemic cardiomyopathy but again optimization of his medical regimen has been limited due to patient compliance follow-up and again ongoing drug use.  Hopefully we can get him off of drugs but he is really been hesitant to do so we have talked to him for the last 4 years about getting off drugs he is started to experience the complications of this with loss of dentition he appears frail and weak and now has no insurance due to not being able to keep a job all of which is related to his ongoing drug use unfortunately.  From our standpoint he is doing better would restart his prior regimen of 80 mg oral Lasix daily along with his prior ARB and carvedilol.  Previously on Aldactone would probably hold this until he is seen in follow-up.  Probably would not be unreasonable to have him referred to our heart failure clinic which does have some good resources as well anyway to get him appropriate follow-up would be great.  He might benefit from further IV diuresis today he was hoping to get out I would say if he walks around and does not have any oxygen needs he will do well with continued diuresis transition to oral diuretics and can follow-up with us as an outpatient.        Chronic systolic heart failure:  Likely related to drug use   Continue Coreg  Continue losartan   Continue lasix     Hypertension:  Continue with current therapy     History of DVT:  Completed 6 months of therapy     Tobacco Abuse:  Not interested in quitting      Methamphetamine use:  Advised to quit, stating he is going to do it this time which is encouraging he is at least interested in quitting this time my past conversations he really had no interest in quitting but I think he is starting to realize the ill effects of drug use obviously very challenging to quit hopefully he will seek out resources perhaps ER can provide some for him.             Michael Ward MD  [facility]  09/30/24  09:19 EDT

## 2024-09-30 NOTE — PLAN OF CARE
Goal Outcome Evaluation:      Patient states he is feeling better this AM, feels less short of breath. Arranged for all medications to be paid for and given to patient at discharge, Instructed on needed follow ups as he only has 30 days worth of medication with no refills. Patient and wife able to teach back. Ambulated patient in etienne on RA before discharge, saturations stayed at 95%. IV removed with tip intact.

## 2024-09-30 NOTE — PLAN OF CARE
Goal Outcome Evaluation:            Pt admitted for CHF. Pt noted to be A&Ox4, able to make needs known, no new complaints overnight, and vitals within defined parameters. Wife at bedside. Cardiology following, IV lasix ordered. Pt aware of and agreeable to ongoing treatment plan.

## 2024-09-30 NOTE — PROGRESS NOTES
Case Management Discharge Note      Final Note: DC home no needs         Selected Continued Care - Discharged on 9/30/2024 Admission date: 9/29/2024 - Discharge disposition: Home or Self Care      Destination    No services have been selected for the patient.                Durable Medical Equipment    No services have been selected for the patient.                Dialysis/Infusion    No services have been selected for the patient.                Home Medical Care    No services have been selected for the patient.                Therapy    No services have been selected for the patient.                Community Resources    No services have been selected for the patient.                Community & DME    No services have been selected for the patient.                         Final Discharge Disposition Code: 01 - home or self-care

## 2024-09-30 NOTE — PROGRESS NOTES
Continued Stay Note  [facility]     Patient Name: Colten Hager  MRN: [id number]  Today's Date: 9/30/2024    Admit Date: 9/29/2024    Plan: Home with wife   Discharge Plan       Row Name 09/30/24 1127       Plan    Plan Home with wife    Plan Comments Followed up with pt and his wife concerning DC needs.  Pt does have list of [facility]HC provided by [facility] over the weekend.  [facility] has left 2 VM for [employer] to contact pt reguarding eligiblity for [place] medicaid.  Also provide [employer] tahmina mathews pt and his wife to contact if [employer] unable to meet with pt prior to DC.  [facility] did provide a Ztrip voucher for pt at [place].   Pt also provded with meds at [place] from  Pharmacy.                   Discharge Codes    No documentation.                 Expected Discharge Date and Time       Expected Discharge Date Expected Discharge Time    Sep 30, 2024               KIANA Laura

## 2024-09-30 NOTE — DISCHARGE INSTRUCTIONS
Restart your medications Lasix 40 mg twice a day, carvedilol 3.125 mg twice a day.  Cozaar 25 mg daily.  You have an appointment at the heart failure clinic on Wednesday at 2 PM.  Call cardiology to confirm if the time is okay or should this need to be rearranged.  Follow-up closely with your primary care physician and cardiology for continued care.  Return to the ER with worsening symptoms or should you have any further concerns.    Make sure you get a months worth of your carvedilol, Lasix, losartan prior to discharge

## 2024-09-30 NOTE — PROGRESS NOTES
"SONIYA ESCALERA Attestation Note    I supervised care provided by the midlevel provider.    The VALENTINO and I have discussed this patient's history, physical exam, and treatment plan. I have reviewed the documentation and personally had a face to face interaction with the patient  I affirm the documentation and agree with the treatment and plan. I provided a substantive portion of the care of this patient.  I personally performed the physical exam, in its entirety.  My personal findings are documented in below:    History:  Patient with history of CHF and hypertension hyperlipidemia is admitted to observation unit for further management of dyspnea and cough.  This morning he says he is feeling better.  He says the \"oxygen helps a lot.\"  Denies chest pain during my evaluation.  Denies any nausea.    Physical Exam:  General: No acute distress.  Sitting up and eating breakfast.  HENT: NCAT, PERRL, Nares patent.  Eyes: no scleral icterus.  Neck: trachea midline, no ROM limitations.  CV: Pink warm and well-perfused throughout  Respiratory: No distress or increased work of breathing.  Slightly diminished breath sounds at the bilateral bases but otherwise clear.  Abdomen: soft, no focal tenderness or rigidity  Musculoskeletal: no deformity.  Neuro: alert, moves all extremities, follows commands.  Skin: warm, dry.    Assessment and Plan:  CHF exacerbation: Cardiology consulted.  Troponins stable.  Continue diuresis with Lasix.    Hypertension: Continue home medications, carvedilol and losartan    "

## 2024-09-30 NOTE — DISCHARGE SUMMARY
ED OBSERVATION PROGRESS/DISCHARGE SUMMARY    Date of Admission: 9/29/2024   LOS: 0 days   PCP: Provider, No Known    Final Diagnosis acute CHF      Subjective     Hospital Outcome:     Colten Hager is a 53 y.o. male with history of HFrEF, hypertension, hyperlipidemia presenting with fatigue, SOA and productive cough.  In the ED his chest x-ray was negative for acute findings, however, his proBNP was 1357.  He admits that he ran a Lasix several weeks ago and has been unable to get it refilled since he does not have a PCP.  He states he has not had reliable medical care for several years due to his disability claim being rejected and he states he cannot work.     Chart review shows he had a CT angiogram on 9/20/2024 at [facility] with mild pulmonary edema.  TTE on 8/25/2024 reveal severely dilated LV with severe global hypokinesia with estimated LVEF between 21 to 25%.       Review of Systems:   Constitutional:  + Extremely fatigued, sleepy   Cardiovascular:  No chest pain, no dyspnea on exertion, no orthopnea, no palpitations, no edema. + Dyspnea   Respiratory: + Productive cough   Neuro:  No weakness, no numbness, no paresthesias, no loss of consciousness, no syncope, no dizziness, no headache.   Psych:  No anxiety/panic, no depression, no insomnia, no personality changes, no delusions.       9/30/2024    10:36 AM.  Patient has been cleared by cardiology.  He is to follow-up the heart failure clinic on Wednesday at 2 PM.  Cardiology to call and confirm appointment with patient.  I am going to give him a 30-day fill of his meds prior to leaving which consist of Lasix 40 mg twice daily, carvedilol 3.125 mg twice daily, Cozaar 25 mg daily.  Patient sats are 97% and he ambulates without any hypoxia.  He states he is ready to go home.  Case management is working on establishing health insurance for the patient given his CHF/comorbidities and need for medical care.  Once this is established and the loose ends are  tied up, patient will be discharged.    Objective   Physical Exam:   Constitutional: Awake, alert. Well developed for age. Nontoxic appearing.   Eyes: PERRL x2, sclerae anicteric, no conjunctival injection. No EOM abnormlaities noted.   HENT: NCAT, mucous membranes moist,   Neck: Supple, no thyromegaly, no lymphadenopathy, trachea midline  Respiratory: Diminished to auscultation bilaterally, nonlabored respirations   Cardiovascular: RRR, no murmurs, rubs, or gallops, palpable pedal pulses bilaterally. No appreciable edema.   Gastrointestinal: Positive bowel sounds, soft, nontender, not distended.   Musculoskeletal: No bilateral ankle edema, no clubbing or cyanosis to extremities. No obvious deformities.   Psychiatric: Appropriate affect, cooperative. Converses appropriately for age.   Neurologic: Oriented x 3, strength symmetric in all extremities. Cranial nerves grossly intact to confrontation, speech clear  Skin: No rashes, skin intact.     Results Review:    I have reviewed the labs, radiology results and diagnostic studies.    Results from last 7 days   Lab Units 09/30/24  0527   WBC 10*3/mm3 7.07   HEMOGLOBIN g/dL 13.5   HEMATOCRIT % 40.5   PLATELETS 10*3/mm3 174     Results from last 7 days   Lab Units 09/30/24  0527 09/29/24  1125   SODIUM mmol/L 139 140   POTASSIUM mmol/L 3.7 3.9   CHLORIDE mmol/L 104 105   CO2 mmol/L 26.7 25.8   BUN mg/dL 18 16   CREATININE mg/dL 0.96 1.02   CALCIUM mg/dL 9.1 8.5*   BILIRUBIN mg/dL  --  0.5   ALK PHOS U/L  --  89   ALT (SGPT) U/L  --  11   AST (SGOT) U/L  --  13   GLUCOSE mg/dL 103* 100*     Imaging Results (Last 24 Hours)       Procedure Component Value Units Date/Time    XR Chest 1 View [[id number]] Collected: 09/29/24 1102     Updated: 09/29/24 1108    Narrative:      XR CHEST 1 VW-     INDICATION: Chest pain     COMPARISON: CT chest 4/10/2024 and radiographs dating back to 11/15/2020       Impression:      No focal consolidation. No pleural effusion or  pneumothorax.  Stable cardiac silhouette at the upper limits of normal.  No focal  osseous abnormality.       This report was finalized on 9/29/2024 11:05 AM by Dr. Sean Gonzalez M.D on Workstation: [id number]               I have reviewed the medications.  ---------------------------------------------------------------------------------------------  Assessment & Plan   Assessment/Problem List    CHF (congestive heart failure)      Plan:    CHF/noncompliance with medication  -Hold on echo as patient had an echo 1 month ago.  -Continue IV Lasix.  40 mg twice daily starting tomorrow  -Morning CBC, BMP, BNP  -Continuous cardiac monitoring and pulse ox  -Strict I&O's  -Cardiac low-salt diet  -Case management consultation  -Cardiology has evaluated and cleared for discharge with the plan above     Hypertension  -Continue with home carvedilol and losartan    Disposition: Discharge    Follow-up after Discharge: CHF clinic Wednesday 2 PM, cardiology    This note will serve as a discharge summary and progress note    Madhu Chi III, PA 09/30/24 10:40 EDT    I have worn appropriate PPE during this patient encounter, sanitized my hands both with entering and exiting patient's room.      51 minutes has been spent by [facility] providers in the care of this patient while under observation status

## 2024-10-01 ENCOUNTER — READMISSION MANAGEMENT (OUTPATIENT)
Dept: CALL CENTER | Facility: HOSPITAL | Age: 54
End: 2024-10-01
Payer: MEDICAID

## 2024-10-01 NOTE — OUTREACH NOTE
CHF Week 1 Survey      Flowsheet Row Responses   [facility] patient discharged from? [place]   Does the patient have one of the following disease processes/diagnoses(primary or secondary)? CHF   CHF Week 1 attempt successful? No   Unsuccessful attempts Attempt 1  [left msgs on all numbers]            LAW GREEN - Registered Nurse

## 2024-10-01 NOTE — OUTREACH NOTE
Prep Survey      Flowsheet Row Responses   [facility] facility patient discharged from? [place]   Is LACE score < 7 ? Yes   Eligibility Readm Mgmt   Discharge diagnosis CHF (congestive heart failure)   Does the patient have one of the following disease processes/diagnoses(primary or secondary)? CHF   Does the patient have Home health ordered? No   Is there a DME ordered? No   Prep survey completed? Yes            Vanda RAMSEY - Registered Nurse

## 2024-10-02 ENCOUNTER — READMISSION MANAGEMENT (OUTPATIENT)
Dept: CALL CENTER | Facility: HOSPITAL | Age: 54
End: 2024-10-02
Payer: MEDICAID

## 2024-10-02 ENCOUNTER — HOSPITAL ENCOUNTER (OUTPATIENT)
Dept: CARDIOLOGY | Facility: HOSPITAL | Age: 54
Discharge: HOME | End: 2024-10-02
Admitting: NURSE PRACTITIONER

## 2024-10-02 VITALS
HEIGHT: 69 IN | HEART RATE: 79 BPM | WEIGHT: 181.4 LBS | SYSTOLIC BLOOD PRESSURE: 119 MMHG | DIASTOLIC BLOOD PRESSURE: 84 MMHG | BODY MASS INDEX: 26.87 KG/M2 | OXYGEN SATURATION: 96 %

## 2024-10-02 DIAGNOSIS — I42.0 NONISCHEMIC DILATED CARDIOMYOPATHY: ICD-10-CM

## 2024-10-02 DIAGNOSIS — I50.22 CHRONIC HFREF (HEART FAILURE WITH REDUCED EJECTION FRACTION): Primary | ICD-10-CM

## 2024-10-02 PROCEDURE — G0463 HOSPITAL OUTPT CLINIC VISIT: HCPCS

## 2024-10-02 PROCEDURE — 94726 PLETHYSMOGRAPHY LUNG VOLUMES: CPT | Performed by: NURSE PRACTITIONER

## 2024-10-02 RX ORDER — SPIRONOLACTONE 25 MG/1
25 TABLET ORAL DAILY
Qty: 30 TABLET | Refills: 0 | Status: SHIPPED | OUTPATIENT
Start: 2024-10-02 | End: 2024-10-04

## 2024-10-02 RX ORDER — DAPAGLIFLOZIN 10 MG/1
10 TABLET, FILM COATED ORAL DAILY
Start: 2024-10-02

## 2024-10-02 NOTE — PROGRESS NOTES
[facility]    Michael Ward MD  [address]  [address]  [place] [address]    Thank you for asking me to see Colten Hager.     Congestive Heart Failure  Associated symptoms include shortness of breath.       1. NICM/HFrEF    Subjective   Mr. Colten Hager is a 53 year old male that presents to the Heart Failure clinic for initial evaluation of chronic NICM/HFrEF.   He has a history of nonischemic cardiomyopathy, hypertension, DVT and methamphetamine use.   He is followed by G Dr Ward.   His cardiomyopathy has been felt to be due to chornic/contiued methamphetamine use. He has previously been admives to stop drug use and start on medical therapy.   Unfortunately,  he continued meth use.  Hospitalization 8/23/2024-8/25/2024 for acute appendicitis, he had 2D TTE during this hospitalization which showed-There is severe global hypokinesis. LVEF 21 - 25%.  Left ventricular diastolic function is consistent with (grade II w/high LAP) pseudonormalization.  Normal right ventricular cavity size and systolic function noted. The left atrial cavity is moderately dilated.  Mild to moderate mitral valve regurgitation is present. Mild tricuspid valve regurgitation is present. RVSP 33 mmHg.    He presents to clinic today after ADHF hospitalization 9/29/2024. He received IV diuretics, and his prior regimen of 80 mg oral Lasix daily along with his prior ARB and carvedilol were continued at discharge. His aldactone was held at discharge until follow-up  He states he does want to stop meth use and has not used since most recent hospitalization 9/29. He presents to clinic with his wife who states they are currently under a great deal of financial strain as she is the only income for them and they are at risk of getting evicted from their current home.   Additionally he is very concerned because he does not have any insurance. They are working to get medicaid    Review of Systems -  Review of Systems   Constitutional: Negative for weight gain and weight loss.   Cardiovascular:  Positive for dyspnea on exertion. Negative for leg swelling.   Respiratory:  Positive for shortness of breath.           Patient Active Problem List   Diagnosis    Essential hypertension    Substance abuse    Dyslipidemia    GERD without esophagitis    Methamphetamine abuse    IV drug abuse    Tobacco abuse    Chronic systolic congestive heart failure    Acute deep vein thrombosis (DVT) of calf muscle vein of left lower extremity    Nonischemic dilated cardiomyopathy    History of DVT of lower extremity    Acute appendicitis with localized peritonitis, without perforation or abscess    CHF (congestive heart failure)     family history is not on file.   reports that he has been smoking cigarettes. He has never used smokeless tobacco. He reports current drug use. Drug: Methamphetamines. He reports that he does not drink alcohol.  No Known Allergies  Physical Activity: Not on file          Current Outpatient Medications:     carvedilol (COREG) 3.125 MG tablet, Take 1 tablet by mouth Every 12 (Twelve) Hours., Disp: 60 tablet, Rfl: 0    losartan (COZAAR) 25 MG tablet, Take 1 tablet by mouth Daily. NEEDS AN APPOINTMENT FOR FUTURE REFILLS, Disp: 30 tablet, Rfl: 0    dapagliflozin Propanediol (Farxiga) 10 MG tablet, Take 10 mg by mouth Daily., Disp: , Rfl:     furosemide (Lasix) 40 MG tablet, Take 1 tablet by mouth Daily for 60 days., Disp: , Rfl:     spironolactone (ALDACTONE) 25 MG tablet, Take 0.5 tablets by mouth Daily., Disp: , Rfl:     Objective   Vital Sign Review:   Vitals:    10/02/24 1429   BP: 119/84   Pulse: 79   SpO2: 96%     Body mass index is 26.78 kg/m².      10/02/24  1429   Weight: 82.3 kg (181 lb 6.4 oz)     Physical Exam:  Constitutional:       Appearance: Normal and healthy appearance.   Neck:      Vascular: No carotid bruit or JVD. JVD normal.   Pulmonary:      Effort: Pulmonary effort is normal.       Breath sounds: Normal breath sounds.   Cardiovascular:      PMI at left midclavicular line. Normal rate. Regular rhythm.      Murmurs: There is a systolic murmur.   Pulses:     Radial: 2+ bilaterally.  Edema:     Peripheral edema absent.   Abdominal:      Palpations: Abdomen is soft.      Tenderness: There is no abdominal tenderness.   Skin:     General: Skin is warm and dry.   Neurological:      General: No focal deficit present.      Mental Status: Alert and oriented to person, place and time.   Psychiatric:         Behavior: Behavior is cooperative.        DATA REVIEWED:   EKG:      ---------------------------------------------------  ECHO:    Results for orders placed during the hospital encounter of 08/23/24    Adult Transthoracic Echo Complete W/ Cont if Necessary Per Protocol    Interpretation Summary    The left ventricular cavity is severely dilated.    There is severe global hypokinesis. Best antwon segment appears to be the septum    Left ventricular systolic function is severely decreased. Left ventricular ejection fraction appears to be 21 - 25%.    Left ventricular diastolic function is consistent with (grade II w/high LAP) pseudonormalization.    Normal right ventricular cavity size and systolic function noted.    The left atrial cavity is moderately dilated.    Mild to moderate mitral valve regurgitation is present.    Mild tricuspid valve regurgitation is present    Calculated right ventricular systolic pressure from tricuspid regurgitation is 33 mmHg.    There is no evidence of pericardial effusion          -----------------------------------------------------  RHC/LHC    Results for orders placed during the hospital encounter of 11/15/20    Cardiac Catheterization/Vascular Study    Conclusion  · Severe systolic dysfunction.  · 24/ 1.  Dilated cardiomyopathy:  Etiology: Uncertain  Anatomy: Normal coronary arteries, severely dilated left ventricle  Physiology: Poor global left ventricular  "function, mitral regurgitation, congestive heart failure  Functional status: Severely compromised  Prognosis: Guarded      ---------------------------------------------------------------------------    CT:   XR Chest 1 View    Result Date: 9/29/2024  No focal consolidation. No pleural effusion or pneumothorax. Stable cardiac silhouette at the upper limits of normal.  No focal osseous abnormality.   This report was finalized on 9/29/2024 11:05 AM by Dr. Sean Gonzalez M.D on Workstation: [id number]           --------------------------------------------------------------------------------------------------------------    Laboratory evaluations:  Renal Function: Estimated Creatinine Clearance: 102.4 mL/min (by C-G formula based on SCr of 0.96 mg/dL).    Lab Results   Component Value Date    GLUCOSE 103 (H) 09/30/2024    BUN 18 09/30/2024    CREATININE 0.96 09/30/2024    EGFRIFNONA 74 09/01/2021    EGFRIFAFRI >60 11/14/2020    BCR 18.8 09/30/2024    K 3.7 09/30/2024    CO2 26.7 09/30/2024    CALCIUM 9.1 09/30/2024    ALBUMIN 3.7 09/29/2024    LABIL2 1.1 11/14/2020    AST 13 09/29/2024    ALT 11 09/29/2024     Lab Results   Component Value Date    WBC 7.07 09/30/2024    HGB 13.5 09/30/2024    HCT 40.5 09/30/2024    MCV 92.3 09/30/2024     09/30/2024     No results found for: \"HGBA1C\"  No results found for: \"HGBA1C\"  Lab Results   Component Value Date    CREATININE 0.96 09/30/2024     No results found for: \"IRON\", \"TIBC\", \"FERRITIN\"    Result Review:  I have personally reviewed the results from the time of this admission to 10/5/2024 11:13 EDT and agree with these findings:  [x]  Laboratory list / accordion  []  Microbiology  [x]  Radiology  [x]  EKG/Telemetry   [x]  Cardiology/Vascular   []  Pathology  [x]  Old records  []  Other:  Most notable findings include:   Note last admission  \"Mr. Hager is a 53 y.o. gentleman with past medical history notable for nonischemic cardiomyopathy, hypertension, history of DVT, " tobacco abuse, and ongoing methamphetamine use who presents to the emergency room with worsening shortness of breath and dyspnea on exertion.  He has been without Lasix for a number of weeks.  He reports that he did have access to his other medications possibly through our office may be through another nurse practitioner he had seen although somewhat questionable.  Nonetheless he has been restarted on his prior regimen.  Previously this is done a reasonable job with controlling his symptoms.  The main issue though is his ongoing methamphetamine use.  He admitted to using methamphetamine on 9/28.  Our emergency room did not get a drug screen not sure why but nonetheless he does admit to using methamphetamines likely the reason for his ongoing and persistent cardiomyopathy and does limit options to better treat him most notably further consideration for primary prevention ICD which would not be safe to implant in somebody with ongoing methamphetamine use.  I think if he were to get off of drugs his heart would likely recover this is usually the case not impossible that he has not another reason for his cardiomyopathy or an intrinsic nonischemic cardiomyopathy but again optimization of his medical regimen has been limited due to patient compliance follow-up and again ongoing drug use.  Hopefully we can get him off of drugs but he is really been hesitant to do so we have talked to him for the last 4 years about getting off drugs he is started to experience the complications of this with loss of dentition he appears frail and weak and now has no insurance due to not being able to keep a job all of which is related to his ongoing drug use unfortunately.  From our standpoint he is doing better would restart his prior regimen of 80 mg oral Lasix daily along with his prior ARB and carvedilol.  Previously on Aldactone would probably hold this until he is seen in follow-up.  Probably would not be unreasonable to have him  "referred to our heart failure clinic which does have some good resources as well anyway to get him appropriate follow-up would be great.  He might benefit from further IV diuresis today he was hoping to get out I would say if he walks around and does not have any oxygen needs he will do well with continued diuresis transition to oral diuretics and can follow-up with us as an outpatient. \"       PH Screening:  The patientwas screened today for PH.  The patient's last 2D TTE showed the patient does not currently have PH.         Sleep Evaluation:  We will need to discuss sleep evaluation at future visit.         6 MINUTE WALK   He refused 6min walk                    Cardiac Amyloid Screening Questions     We will need to review amyloid screening questions at future visit      -ICD/CRT-D:   Current ICD/CRT-D indications:   *ICM with LVEF less than or equal to 35% due to prior myocardial infarction who are at least 40 days post-myocardial infarction and are in NYHA functional Class II or III.   *ICM due to prior myocardial infarction who are at least 40 days post-myocardial infarction, have an LVEF less than or equal to 30%, and are in NYHA functional Class I.   *NICM with LVEF less than or equal to 35% and who are in NYHA functional Class II or III. These patients should be on GDMT for at least 90 days with another LV EF assessment showing LV EF<35%.      [email or web address]    ------------------------------------------------------------------  Today, the patient was assessed for ICD/CRT-D therapy. ICD would be indicated as he has had continued depressed EF, however the patient does have continued methamphetamine use and there is concern about safety of ICD implantation in current/active drug user        AHF:   Around 10% of HF patients will need AHF. ALL patients should be screened at EACH VISIT for this indication. The INEEDHELP mnemonic is commonly utilized to " "determine if the patient needs an assessment for AHF therapies including inotropes at home, consideration for OHT/VAD.    Referral to HF Specialist: Uses the acronym I-NEED-HELP.  I: Intravenous inotropes  N: New York Heart Association (NYHA) class IIIB/IV or persistently elevated natriuretic peptides  E: End-organ dysfunction  E: EF ?35%  D: Defibrillator shocks  H: Hospitalizations >1  E: Edema despite escalating diuretics  L: Low systolic BP ?90, high heart rate  P: Prognostic medication; progressive intolerance or down-titration of guideline-directed medical therapy [GDMT])    -The patient was screened today for AHF and does meet indications as he has had continued reduced EF, discussed with the patient that he must stop using drugs. We will discuss at next visit          ReDS VOLUMETRIC ASSESSMENT:  ReDs Vest    Performed by: Lizeth Mazariegos APRN  Authorized by: Lizeth Mazariegos APRN    ReDS value:  35  ReDS Value Description:  25-35 (green) = Optimal Volume Status        Assessment & Plan      1. Chronic systolic congestive heart failure    2. Nonischemic dilated cardiomyopathy      1-2 NICM/HFrEF--EF 21-25%. Pt appears euvolemic.   He states he stopped using meth---he has not used meth since he was discharged from the hospital. (A couple days ago)  He dneies any other drug or alcohol use.   Still smokes cigarettes-encouarged cessation--he is not interested in anything to help stopping smoking at this time.   Has had reported \"reaction to metoprolol\" reports some questionable flushing like symptoms/numbness/electrical shock from head to toes. Does not appear to be a true reaction, we may need to consider trial again in order to allow more Blood pressure to start entresto  We will start farxiga--patient will need PAP as he currently does not have any insurance coverage.   He has previously been on spironolactone, we will start spironolactone today. Repeat BMP in 5 days.   Referral placed for social " services.   We will provide IMAN rides to and from hf clinic appointments and required lab draws. He verbalized understanding.   Directions for when to call the clinic reviewed with the patient to include weight gain of 2 to 3 pounds in 24 hours, weight gain of 5 to 10 pounds within 7 days; worsening shortness of breath; worsening lower extremity edema or abdominal distention.        The KCCQ-12 was updated at the visit today.  score:  16 .            NYHA stage C FC-III-IV     Clinical status was assessed and has worsened.  Treatment intent: curative   ReDS reading was obtained today.  ReDS result: 35       Today, Patient appears euvolemic. and with perfusion. The patient's hemodynamics are currently acceptable. HR is: normal and is at goal. BP/MAP was reviewed and there is room for medication up-titration.  The patient's clinical course has been impacted by drug use. LVEF: 21-25%.     GDMT Assessment: The patient is currently on quadruple therapy (with addition of medications today).      GDMT changes recommended today:  restart MRA, start SGLT2      BB:   continue Carvedilol  3.125mg bid  Monitor heart rate.  Please call the [facility] for HR<50, dizziness, lightheadedness, syncope    A/A/A:     continue  Losartan  25mg daily  Occasional monitoring of Chem-7 is recommended; call for the development of a new cough or S/Sx angioedema    SGLT2-I:  start Dapagliflozin (Farxiga) 10mg daily  Call for S/Sx genital mycotic infections  Do not take when inadequate oral intake, NPO, GI illness    MRA:  The patient is FC-NYHA Class III and MRA is indicated.   start Spironolactone  25mg daily  Will check BMP in 5 days.       -DIURETIC:   furosemide (LASIX) 40 mg two times daily  -Fluid restriction:   -requested  -2000 ml  -Patient has been asked to weigh daily and was provided with a printed diuretic strategy.  -Sodium restriction:   -1,500 mg Na restriction was discussed.      Labs/Diagnostics/Referrals:    Labs -Chem-7-in 5 days        Lifestyle Advice:   - call office if I gain more than 2 pounds in one day or 5 pounds in one week   - keep legs up while sitting   - use salt in moderation   - watch for swelling in feet, ankles and legs every day   - weigh myself daily   -call for concerning s/sx   -- activity or exercise based on tolerance encouraged     CODE STATUS: FULL              Return in about 2 weeks (around 10/16/2024).                Thank you for allowing me to participate in the care of your patient,         Lizeth Mazariegos, APRN    10/05/24  14:42 EDT      **Felipe Disclaimer:**  Much of this encounter note is an electronic transcription/translation of spoken language to printed text. The electronic translation of spoken language may permit erroneous, or at times, nonsensical words or phrases to be inadvertently transcribed. Although I have reviewed the note for such errors, some may still exist.    The information in this note was reviewed and updated during the visit to be as accurate as possible. As many patients have chronic medical problems, many of their individual problems and ongoing plans do not change significantly from visit to visit.  This information is correct and is consistent with my treatment plan as of today's visit.

## 2024-10-02 NOTE — OUTREACH NOTE
CHF Week 1 Survey      Flowsheet Row Responses   [facility] patient discharged from? [place]   Does the patient have one of the following disease processes/diagnoses(primary or secondary)? CHF   CHF Week 1 attempt successful? No   Unsuccessful attempts Attempt 2  [all numbers attempted, one # not ringing/working]            Jovana ROJO - Registered Nurse

## 2024-10-02 NOTE — LETTER
October 5, 2024     Michael Ward MD  [address] [facility]  [address] [place] [id number]    Patient: Colten Hager   YOB: 1970   Date of Visit: 10/2/2024     Dear Michael Ward MD:       Thank you for referring Colten Hager to me for evaluation. Below are the relevant portions of my assessment and plan of care.    If you have questions, please do not hesitate to call me. I look forward to following Colten along with you.         Sincerely,        DIANA Waldrop        CC: No Recipients    Lizeth Mazariegos APRN  10/05/24 1134  Signed    [facility]    Michael Ward MD  [address] [id number]    Thank you for asking me to see Colten Hager.     Congestive Heart Failure  Associated symptoms include shortness of breath.       1. NICM/HFrEF    Subjective   Mr. Colten Hager is a 53 year old male that presents to the Heart Failure clinic for initial evaluation of chronic NICM/HFrEF.   He has a history of nonischemic cardiomyopathy, hypertension, DVT and methamphetamine use.   He is followed by G Dr Ward.   His cardiomyopathy has been felt to be due to chornic/contiued methamphetamine use. He has previously been admives to stop drug use and start on medical therapy.   Unfortunately,  he continued meth use.  Hospitalization 8/23/2024-8/25/2024 for acute appendicitis, he had 2D TTE during this hospitalization which showed-There is severe global hypokinesis. LVEF 21 - 25%.  Left ventricular diastolic function is consistent with (grade II w/high LAP) pseudonormalization.  Normal right ventricular cavity size and systolic function noted. The left atrial cavity is moderately dilated.  Mild to moderate mitral valve regurgitation is present. Mild tricuspid valve regurgitation is present. RVSP 33 mmHg.    He presents to clinic today after ADHF hospitalization 9/29/2024. He received IV diuretics, and his prior regimen of 80  mg oral Lasix daily along with his prior ARB and carvedilol were continued at discharge. His aldactone was held at discharge until follow-up  He states he does want to stop meth use and has not used since most recent hospitalization 9/29. He presents to clinic with his wife who states they are currently under a great deal of financial strain as she is the only income for them and they are at risk of getting evicted from their current home.   Additionally he is very concerned because he does not have any insurance. They are working to get medicaid    Review of Systems - Review of Systems   Constitutional: Negative for weight gain and weight loss.   Cardiovascular:  Positive for dyspnea on exertion. Negative for leg swelling.   Respiratory:  Positive for shortness of breath.           Patient Active Problem List   Diagnosis   • Essential hypertension   • Substance abuse   • Dyslipidemia   • GERD without esophagitis   • Methamphetamine abuse   • IV drug abuse   • Tobacco abuse   • Chronic systolic congestive heart failure   • Acute deep vein thrombosis (DVT) of calf muscle vein of left lower extremity   • Nonischemic dilated cardiomyopathy   • History of DVT of lower extremity   • Acute appendicitis with localized peritonitis, without perforation or abscess   • CHF (congestive heart failure)     family history is not on file.   reports that he has been smoking cigarettes. He has never used smokeless tobacco. He reports current drug use. Drug: Methamphetamines. He reports that he does not drink alcohol.  No Known Allergies  Physical Activity: Not on file          Current Outpatient Medications:   •  carvedilol (COREG) 3.125 MG tablet, Take 1 tablet by mouth Every 12 (Twelve) Hours., Disp: 60 tablet, Rfl: 0  •  losartan (COZAAR) 25 MG tablet, Take 1 tablet by mouth Daily. NEEDS AN APPOINTMENT FOR FUTURE REFILLS, Disp: 30 tablet, Rfl: 0  •  dapagliflozin Propanediol (Farxiga) 10 MG tablet, Take 10 mg by mouth Daily.,  Disp: , Rfl:   •  furosemide (Lasix) 40 MG tablet, Take 1 tablet by mouth Daily for 60 days., Disp: , Rfl:   •  spironolactone (ALDACTONE) 25 MG tablet, Take 0.5 tablets by mouth Daily., Disp: , Rfl:     Objective   Vital Sign Review:   Vitals:    10/02/24 1429   BP: 119/84   Pulse: 79   SpO2: 96%     Body mass index is 26.78 kg/m².      10/02/24  1429   Weight: 82.3 kg (181 lb 6.4 oz)     Physical Exam:  Constitutional:       Appearance: Normal and healthy appearance.   Neck:      Vascular: No carotid bruit or JVD. JVD normal.   Pulmonary:      Effort: Pulmonary effort is normal.      Breath sounds: Normal breath sounds.   Cardiovascular:      PMI at left midclavicular line. Normal rate. Regular rhythm.      Murmurs: There is a systolic murmur.   Pulses:     Radial: 2+ bilaterally.  Edema:     Peripheral edema absent.   Abdominal:      Palpations: Abdomen is soft.      Tenderness: There is no abdominal tenderness.   Skin:     General: Skin is warm and dry.   Neurological:      General: No focal deficit present.      Mental Status: Alert and oriented to person, place and time.   Psychiatric:         Behavior: Behavior is cooperative.        DATA REVIEWED:   EKG:      ---------------------------------------------------  ECHO:    Results for orders placed during the hospital encounter of 08/23/24    Adult Transthoracic Echo Complete W/ Cont if Necessary Per Protocol    Interpretation Summary  •  The left ventricular cavity is severely dilated.  •  There is severe global hypokinesis. Best antwon segment appears to be the septum  •  Left ventricular systolic function is severely decreased. Left ventricular ejection fraction appears to be 21 - 25%.  •  Left ventricular diastolic function is consistent with (grade II w/high LAP) pseudonormalization.  •  Normal right ventricular cavity size and systolic function noted.  •  The left atrial cavity is moderately dilated.  •  Mild to moderate mitral valve regurgitation is  "present.  •  Mild tricuspid valve regurgitation is present  •  Calculated right ventricular systolic pressure from tricuspid regurgitation is 33 mmHg.  •  There is no evidence of pericardial effusion          -----------------------------------------------------  RHC/LHC    Results for orders placed during the hospital encounter of 11/15/20    Cardiac Catheterization/Vascular Study    Conclusion  · Severe systolic dysfunction.  · 24/ 1.  Dilated cardiomyopathy:  Etiology: Uncertain  Anatomy: Normal coronary arteries, severely dilated left ventricle  Physiology: Poor global left ventricular function, mitral regurgitation, congestive heart failure  Functional status: Severely compromised  Prognosis: Guarded      ---------------------------------------------------------------------------    CT:   XR Chest 1 View    Result Date: 9/29/2024  No focal consolidation. No pleural effusion or pneumothorax. Stable cardiac silhouette at the upper limits of normal.  No focal osseous abnormality.   This report was finalized on 9/29/2024 11:05 AM by Dr. Sean Gonzalez M.D on Workstation: [id number]           --------------------------------------------------------------------------------------------------------------    Laboratory evaluations:  Renal Function: Estimated Creatinine Clearance: 102.4 mL/min (by C-G formula based on SCr of 0.96 mg/dL).    Lab Results   Component Value Date    GLUCOSE 103 (H) 09/30/2024    BUN 18 09/30/2024    CREATININE 0.96 09/30/2024    EGFRIFNONA 74 09/01/2021    EGFRIFAFRI >60 11/14/2020    BCR 18.8 09/30/2024    K 3.7 09/30/2024    CO2 26.7 09/30/2024    CALCIUM 9.1 09/30/2024    ALBUMIN 3.7 09/29/2024    LABIL2 1.1 11/14/2020    AST 13 09/29/2024    ALT 11 09/29/2024     Lab Results   Component Value Date    WBC 7.07 09/30/2024    HGB 13.5 09/30/2024    HCT 40.5 09/30/2024    MCV 92.3 09/30/2024     09/30/2024     No results found for: \"HGBA1C\"  No results found for: \"HGBA1C\"  Lab " "Results   Component Value Date    CREATININE 0.96 09/30/2024     No results found for: \"IRON\", \"TIBC\", \"FERRITIN\"    Result Review:  I have personally reviewed the results from the time of this admission to 10/5/2024 11:13 EDT and agree with these findings:  [x]  Laboratory list / accordion  []  Microbiology  [x]  Radiology  [x]  EKG/Telemetry   [x]  Cardiology/Vascular   []  Pathology  [x]  Old records  []  Other:  Most notable findings include:   Note last admission  \"Mr. Hager is a 53 y.o. gentleman with past medical history notable for nonischemic cardiomyopathy, hypertension, history of DVT, tobacco abuse, and ongoing methamphetamine use who presents to the emergency room with worsening shortness of breath and dyspnea on exertion.  He has been without Lasix for a number of weeks.  He reports that he did have access to his other medications possibly through our office may be through another nurse practitioner he had seen although somewhat questionable.  Nonetheless he has been restarted on his prior regimen.  Previously this is done a reasonable job with controlling his symptoms.  The main issue though is his ongoing methamphetamine use.  He admitted to using methamphetamine on 9/28.  Our emergency room did not get a drug screen not sure why but nonetheless he does admit to using methamphetamines likely the reason for his ongoing and persistent cardiomyopathy and does limit options to better treat him most notably further consideration for primary prevention ICD which would not be safe to implant in somebody with ongoing methamphetamine use.  I think if he were to get off of drugs his heart would likely recover this is usually the case not impossible that he has not another reason for his cardiomyopathy or an intrinsic nonischemic cardiomyopathy but again optimization of his medical regimen has been limited due to patient compliance follow-up and again ongoing drug use.  Hopefully we can get him off of drugs but " "he is really been hesitant to do so we have talked to him for the last 4 years about getting off drugs he is started to experience the complications of this with loss of dentition he appears frail and weak and now has no insurance due to not being able to keep a job all of which is related to his ongoing drug use unfortunately.  From our standpoint he is doing better would restart his prior regimen of 80 mg oral Lasix daily along with his prior ARB and carvedilol.  Previously on Aldactone would probably hold this until he is seen in follow-up.  Probably would not be unreasonable to have him referred to our heart failure clinic which does have some good resources as well anyway to get him appropriate follow-up would be great.  He might benefit from further IV diuresis today he was hoping to get out I would say if he walks around and does not have any oxygen needs he will do well with continued diuresis transition to oral diuretics and can follow-up with us as an outpatient. \"       PH Screening:  The patientwas screened today for PH.  The patient's last 2D TTE showed the patient does not currently have PH.         Sleep Evaluation:  We will need to discuss sleep evaluation at future visit.         6 MINUTE WALK   He refused 6min walk                    Cardiac Amyloid Screening Questions     We will need to review amyloid screening questions at future visit      -ICD/CRT-D:   Current ICD/CRT-D indications:   *ICM with LVEF less than or equal to 35% due to prior myocardial infarction who are at least 40 days post-myocardial infarction and are in NYHA functional Class II or III.   *ICM due to prior myocardial infarction who are at least 40 days post-myocardial infarction, have an LVEF less than or equal to 30%, and are in NYHA functional Class I.   *NICM with LVEF less than or equal to 35% and who are in NYHA functional Class II or III. These patients should be on GDMT for at least 90 days with another LV EF assessment " showing LV EF<35%.      [email or web address]    ------------------------------------------------------------------  Today, the patient was assessed for ICD/CRT-D therapy. ICD would be indicated as he has had continued depressed EF, however the patient does have continued methamphetamine use and there is concern about safety of ICD implantation in current/active drug user        AHF:   Around 10% of HF patients will need AHF. ALL patients should be screened at EACH VISIT for this indication. The INEEDHELP mnemonic is commonly utilized to determine if the patient needs an assessment for AHF therapies including inotropes at home, consideration for OHT/VAD.    Referral to HF Specialist: Uses the acronym I-NEED-HELP.  I: Intravenous inotropes  N: New York Heart Association (NYHA) class IIIB/IV or persistently elevated natriuretic peptides  E: End-organ dysfunction  E: EF =35%  D: Defibrillator shocks  H: Hospitalizations >1  E: Edema despite escalating diuretics  L: Low systolic BP =90, high heart rate  P: Prognostic medication; progressive intolerance or down-titration of guideline-directed medical therapy [GDMT])    -The patient was screened today for AHF and does meet indications as he has had continued reduced EF, discussed with the patient that he must stop using drugs. We will discuss at next visit          ReDS VOLUMETRIC ASSESSMENT:  ReDs Vest    Performed by: Lizeth Mazariegos APRN  Authorized by: Lizeth Mazariegos APRN    ReDS value:  35  ReDS Value Description:  25-35 (green) = Optimal Volume Status        Assessment & Plan      1. Chronic systolic congestive heart failure    2. Nonischemic dilated cardiomyopathy      1-2 NICM/HFrEF--EF 21-25%. Pt appears euvolemic.   He states he stopped using meth---he has not used meth since he was discharged from the hospital. (A couple days ago)  He dneies any other drug or alcohol use.   Still smokes  "cigarettes-encouarged cessation--he is not interested in anything to help stopping smoking at this time.   Has had reported \"reaction to metoprolol\" reports some questionable flushing like symptoms/numbness/electrical shock from head to toes. Does not appear to be a true reaction, we may need to consider trial again in order to allow more Blood pressure to start entresto  We will start farxiga--patient will need PAP as he currently does not have any insurance coverage.   He has previously been on spironolactone, we will start spironolactone today. Repeat BMP in 5 days.   Referral placed for .   We will provide LYFT rides to and from hf clinic appointments and required lab draws. He verbalized understanding.   Directions for when to call the clinic reviewed with the patient to include weight gain of 2 to 3 pounds in 24 hours, weight gain of 5 to 10 pounds within 7 days; worsening shortness of breath; worsening lower extremity edema or abdominal distention.        The KCCQ-12 was updated at the visit today.  score:  16 .            NYHA stage C FC-III-IV     Clinical status was assessed and has worsened.  Treatment intent: curative   ReDS reading was obtained today.  ReDS result: 35       Today, Patient appears euvolemic. and with perfusion. The patient's hemodynamics are currently acceptable. HR is: normal and is at goal. BP/MAP was reviewed and there is room for medication up-titration.  The patient's clinical course has been impacted by drug use. LVEF: 21-25%.     GDMT Assessment: The patient is currently on quadruple therapy (with addition of medications today).      GDMT changes recommended today:  restart MRA, start SGLT2      BB:   continue Carvedilol  3.125mg bid  Monitor heart rate.  Please call the [facility] for HR<50, dizziness, lightheadedness, syncope    A/A/A:     continue  Losartan  25mg daily  Occasional monitoring of Chem-7 is recommended; call for the development of a new cough or S/Sx " angioedema    SGLT2-I:  start Dapagliflozin (Farxiga) 10mg daily  Call for S/Sx genital mycotic infections  Do not take when inadequate oral intake, NPO, GI illness    MRA:  The patient is FC-NYHA Class III and MRA is indicated.   start Spironolactone  25mg daily  Will check BMP in 5 days.       -DIURETIC:   furosemide (LASIX) 40 mg two times daily  -Fluid restriction:   -requested  -2000 ml  -Patient has been asked to weigh daily and was provided with a printed diuretic strategy.  -Sodium restriction:   -1,500 mg Na restriction was discussed.      Labs/Diagnostics/Referrals:    Labs -Chem-7-in 5 days       Lifestyle Advice:   - call office if I gain more than 2 pounds in one day or 5 pounds in one week   - keep legs up while sitting   - use salt in moderation   - watch for swelling in feet, ankles and legs every day   - weigh myself daily   -call for concerning s/sx   -- activity or exercise based on tolerance encouraged     CODE STATUS: FULL              Return in about 2 weeks (around 10/16/2024).                Thank you for allowing me to participate in the care of your patient,         Lizeth Mazariegos, APRN    10/05/24  14:42 EDT      **Felipe Disclaimer:**  Much of this encounter note is an electronic transcription/translation of spoken language to printed text. The electronic translation of spoken language may permit erroneous, or at times, nonsensical words or phrases to be inadvertently transcribed. Although I have reviewed the note for such errors, some may still exist.    The information in this note was reviewed and updated during the visit to be as accurate as possible. As many patients have chronic medical problems, many of their individual problems and ongoing plans do not change significantly from visit to visit.  This information is correct and is consistent with my treatment plan as of today's visit.

## 2024-10-02 NOTE — PATIENT INSTRUCTIONS
Start farxiga 10mg daily    Start spironolactone 25mg daily, you will need to get BMP checked in 5 days.     Directions for when to call the clinic reviewed with the patient to include weight gain of 2 to 3 pounds in 24 hours, weight gain of 5 to 10 pounds within 7 days; worsening shortness of breath; worsening lower extremity edema or abdominal distention.

## 2024-10-03 ENCOUNTER — READMISSION MANAGEMENT (OUTPATIENT)
Dept: CALL CENTER | Facility: HOSPITAL | Age: 54
End: 2024-10-03
Payer: MEDICAID

## 2024-10-03 ENCOUNTER — REFERRAL TRIAGE (OUTPATIENT)
Age: 54
End: 2024-10-03
Payer: MEDICAID

## 2024-10-03 NOTE — OUTREACH NOTE
CHF Week 1 Survey      Flowsheet Row Responses   [facility] patient discharged from? [place]   Does the patient have one of the following disease processes/diagnoses(primary or secondary)? CHF   CHF Week 1 attempt successful? No   Unsuccessful attempts Attempt 3            DARIUS HERNANDEZ - Registered Nurse

## 2024-10-04 ENCOUNTER — TELEPHONE (OUTPATIENT)
Dept: CARDIOLOGY | Facility: CLINIC | Age: 54
End: 2024-10-04
Payer: MEDICAID

## 2024-10-04 ENCOUNTER — TELEPHONE (OUTPATIENT)
Dept: CARDIOLOGY | Facility: HOSPITAL | Age: 54
End: 2024-10-04
Payer: MEDICAID

## 2024-10-04 RX ORDER — FUROSEMIDE 40 MG
40 TABLET ORAL DAILY
Start: 2024-10-04 | End: 2024-12-03

## 2024-10-04 RX ORDER — SPIRONOLACTONE 25 MG/1
12.5 TABLET ORAL DAILY
Start: 2024-10-04

## 2024-10-04 NOTE — TELEPHONE ENCOUNTER
Patient's wife called concerned patient was on too much diuretic. She states that he is sleeping all day, not drinking a lot of fluid, and although initially urinating frequently, reports urination has become less frequent.  He does not have a scale and is not weighing himself daily.  No reported lightheadedness or dizziness.  No new or worsening shortness of breath.  She states he was reporting some back and lower abdominal pain.discussed fluid restriction, but that he should still be drinking fluids through the day--2000ml of fluid allowed per day. Because we started the farxiga and spironolactone--still on lasix and not drinking much, I would be worried about dehydration/APPLE. I recommend decreasing Lasix to 40 mg daily and taking half tablet of the spironolactone (12.5 mg). Continue with plan for blood work on Monday as scheduled. He is getting a LYFT to and from the hospital to get this lab work done.    Discussed with them that patient would be a good candidate for remote patient monitoring, and they are agreeable with this.  Since he has to come on Monday for blood work, he will  the remote patient monitoring at that time.

## 2024-10-04 NOTE — TELEPHONE ENCOUNTER
Patient called after clinic closed yesterday & left a voicemail. He reports that he thinks he is on too much diuretic, he states his side is hurting (like it did before).     He would like a call back at [phone] - if he does not answer, he states we can call his wife Lizeth at [phone].    Thank you,  Gracie JEFFERY

## 2024-10-07 ENCOUNTER — PATIENT OUTREACH (OUTPATIENT)
Age: 54
End: 2024-10-07
Payer: MEDICAID

## 2024-10-07 ENCOUNTER — LAB (OUTPATIENT)
Dept: LAB | Facility: HOSPITAL | Age: 54
End: 2024-10-07
Payer: MEDICAID

## 2024-10-07 DIAGNOSIS — I42.0 NONISCHEMIC DILATED CARDIOMYOPATHY: ICD-10-CM

## 2024-10-07 LAB
ANION GAP SERPL CALCULATED.3IONS-SCNC: 9.7 MMOL/L (ref 5–15)
BUN SERPL-MCNC: 16 MG/DL (ref 6–20)
BUN/CREAT SERPL: 12.8 (ref 7–25)
CALCIUM SPEC-SCNC: 9.1 MG/DL (ref 8.6–10.5)
CHLORIDE SERPL-SCNC: 102 MMOL/L (ref 98–107)
CO2 SERPL-SCNC: 28.3 MMOL/L (ref 22–29)
CREAT SERPL-MCNC: 1.25 MG/DL (ref 0.76–1.27)
EGFRCR SERPLBLD CKD-EPI 2021: 68.4 ML/MIN/1.73
GLUCOSE SERPL-MCNC: 81 MG/DL (ref 65–99)
POTASSIUM SERPL-SCNC: 4.1 MMOL/L (ref 3.5–5.2)
SODIUM SERPL-SCNC: 140 MMOL/L (ref 136–145)

## 2024-10-07 PROCEDURE — 36415 COLL VENOUS BLD VENIPUNCTURE: CPT

## 2024-10-07 PROCEDURE — 80048 BASIC METABOLIC PNL TOTAL CA: CPT

## 2024-10-07 NOTE — OUTREACH NOTE
MSW received referral from  providers office for assistance with community resources. MSW outreach to patient by phone and unable to leave voicemail and call back number due to mailbox full. MSW will continue to attempt outreach to patient.    Kori JAMES -   Ambulatory Case Management    10/7/2024, 10:12 EDT

## 2024-10-07 NOTE — TELEPHONE ENCOUNTER
Spoke with patient today & setup a LYFT so he could come to the hospital to get lab work done for DIANA Nagy.   While on the phone, patient reported that he is having a constant aching feeling on his left side (ribcage area). He is coughing up some phelm that has a yellowish color, he didn't know if this was a good thing or bad. He said he feels like whatever is in his chest/lungs it is breaking up & coming out with the coughing.    Patient got his labs done, but forgot about coming to get the RPM Kit from the clinic.    Call back [phone]    Thanks,  Gracie SÁNCHEZ [facility]

## 2024-10-09 ENCOUNTER — TELEPHONE (OUTPATIENT)
Dept: CARDIOLOGY | Facility: HOSPITAL | Age: 54
End: 2024-10-09
Payer: MEDICAID

## 2024-10-09 NOTE — TELEPHONE ENCOUNTER
----- Message from Joan Ruiz sent at 10/8/2024  9:16 AM EDT -----  Please let patient know his labs are stable. Please check on his symptoms. He can continue on the lower dose of Lasix (40 mg daily) and 12.5 mg of spironolactone.

## 2024-10-10 NOTE — TELEPHONE ENCOUNTER
Called and spoke with patient and informed of instruction per Joan.    Patient verbalized understanding

## 2024-10-15 ENCOUNTER — PATIENT OUTREACH (OUTPATIENT)
Age: 54
End: 2024-10-15
Payer: MEDICAID

## 2024-10-15 NOTE — OUTREACH NOTE
MSW outreach to patient by phone on 3 attempts with no ability to leave voicemail due to patient's voicemail box full. MSW unable to reach by phone and patient does not have MOOIhart to send message with MSW contact information. MSW to sign of due to inability to reach patient.     Kori JAMES -   Ambulatory Case Management    10/15/2024, 13:34 EDT

## 2024-10-16 ENCOUNTER — HOSPITAL ENCOUNTER (OUTPATIENT)
Dept: CARDIOLOGY | Facility: HOSPITAL | Age: 54
Discharge: HOME | End: 2024-10-16
Admitting: NURSE PRACTITIONER
Payer: MEDICAID

## 2024-10-16 VITALS
WEIGHT: 181.6 LBS | HEART RATE: 75 BPM | BODY MASS INDEX: 26.9 KG/M2 | OXYGEN SATURATION: 99 % | DIASTOLIC BLOOD PRESSURE: 74 MMHG | SYSTOLIC BLOOD PRESSURE: 105 MMHG | HEIGHT: 69 IN

## 2024-10-16 DIAGNOSIS — I50.22 CHRONIC HFREF (HEART FAILURE WITH REDUCED EJECTION FRACTION): Primary | ICD-10-CM

## 2024-10-16 PROCEDURE — G0463 HOSPITAL OUTPT CLINIC VISIT: HCPCS

## 2024-10-16 RX ORDER — DAPAGLIFLOZIN 10 MG/1
10 TABLET, FILM COATED ORAL DAILY
Qty: 30 TABLET | Refills: 0 | Status: SHIPPED | OUTPATIENT
Start: 2024-10-16

## 2024-10-16 RX ORDER — SPIRONOLACTONE 25 MG/1
25 TABLET ORAL DAILY
Qty: 30 TABLET | Refills: 0 | Status: SHIPPED | OUTPATIENT
Start: 2024-10-16

## 2024-10-16 NOTE — LETTER
2024     DIANA Waldrop  [address]  [address]  [place] [address]    Patient: Colten Hager   YOB: 1970   Date of Visit: 10/16/2024     Dear DIANA Gil:    Thank you for referring Colten Hager to me for evaluation. Below are the relevant portions of my assessment and plan of care.    If you have questions, please do not hesitate to call me. I look forward to following Colten along with you.         Sincerely,        PHARMACIST [facility] HEART FAIL        CC: Michael Ward MD      Progress Notes:  [facility]      Patient Name: Colten Hager  :1970  Age: 53 y.o.  Sex: male  Referring Provider: Lizeth Mazariegos A*   Primary Cardiologist: Dr. Ward  Encounter Provider: Joan Ruiz, JasonD      Chief Complaint:   Chief Complaint   Patient presents with   • Congestive Heart Failure       HPI:  Patient presents for their initial pharmacy visit. PMH is significant for HFrEF (EF 21-25%), HTN, HLD, NICM, history of DVT, tobacco use, and sustance abuse. His cardiomyopathy is thought to be due to methamphetamine use. [facility] on 10/[facility]/24. He was started on Farxiga and restarted low-dose spironolactone at this visit. His Lasix was also reduced to 40 mg daily from BID dosing. He had follow-up labs on 10/7/24 which were stable. He reports he is tolerating both medications well. No side effects reported. His adherence is pretty good overall, but he misses his evening dose of carvedilol 5x/week due to forgetfulness. He was previously on metoprolol but states he felt like he had an electrical shock type sensation in his head when he was on it.    In relation to his drug use, he states his last use of methamphetamine was yesterday. He quit for 11 days post-hospitalization then relapsed. He is currently using once every 3-4 days which he states is improved from prior when he was using multiple times/day.  "He does have naloxone at home. He states he restarted using due to stress and boredom. He originally quit because of concern over his health. He is not interested in a rehab program at this time and states he would quit cold turkey.       Current Regimen:  Heart Failure  Carvedilol 3.125 mg BID   Farxiga 10 mg daily  Furosemide 40 mg daily  Losartan 25 mg daily  Spironolactone 25 mg (0.5 tablet) daily      Other CV Meds  None      Medication Use:  Adherence: Variable. Missed 5 or more evening doses in the last week. He does great at taking AM meds. Adherence tools used include: pillbox and family member. Barriers for adherence include: patient forgets and psychosocial  Past hx of medication use/intolerance: metoprolol (?flushing)  Affordability: Uninsured  Medicaid pending      Social History:  Tobacco use: current smoker (1 PPD)  has stated he is not interested in using anything to help him quit     EtOH use: none  Illicit drug use:  current methamphetamine use - once every 3-4 days      Immunization Status:  Pneumococcal: PPSV23 (11/2020, 7/2021)  Influenza: due for 2024      OBJECTIVE:    /74 (BP Location: Left arm, Patient Position: Sitting)   Pulse 75   Ht 175.3 cm (69.02\")   Wt 82.4 kg (181 lb 9.6 oz)   SpO2 99%   BMI 26.81 kg/m²     Body mass index is 26.81 kg/m².  Wt Readings from Last 1 Encounters:   10/16/24 82.4 kg (181 lb 9.6 oz)       Lab Review:  Renal Function: Estimated Creatinine Clearance: 78.7 mL/min (by C-G formula based on SCr of 1.25 mg/dL).    Lab Results   Component Value Date    PROBNP 1,083.0 (H) 09/30/2024       Results for orders placed during the hospital encounter of 08/23/24    Adult Transthoracic Echo Complete W/ Cont if Necessary Per Protocol    Interpretation Summary  •  The left ventricular cavity is severely dilated.  •  There is severe global hypokinesis. Best antwon segment appears to be the septum  •  Left ventricular systolic function is severely decreased. " "Left ventricular ejection fraction appears to be 21 - 25%.  •  Left ventricular diastolic function is consistent with (grade II w/high LAP) pseudonormalization.  •  Normal right ventricular cavity size and systolic function noted.  •  The left atrial cavity is moderately dilated.  •  Mild to moderate mitral valve regurgitation is present.  •  Mild tricuspid valve regurgitation is present  •  Calculated right ventricular systolic pressure from tricuspid regurgitation is 33 mmHg.  •  There is no evidence of pericardial effusion      ASSESSMENT/PLAN OF CARE:    HFrEF (EF 21-25%)  Patient's HF symptoms and weights are stable.  He tolerated Farxiga initiation and spironolactone re-initiation well. His labs were stable on 10/7.  He unfortunately has relapsed and is using methamphetamine currently which is the cause of his HFrEF  MRA: Increase spironolactone to 25 mg daily.  F/u appt and repeat BMP in 1 week  I would like to get his spironolactone to 50 mg daily especially given his LVIDd of 7.2 cm  ACEi/ARB/ARNI: Continue losartan 25 mg daily.  BP is on the softer side today in clinic. He has had no dizziness/lightheadedness  Ideally, I would like to switch this to Entresto but BP may be a limiting factor  BP cuff ordered through the HF fund with the [facility] - will provide to patient at his appt next week and ask him to begin checking BP readings at home  HF Beta-blocker: Continue carvedilol 3.125 mg twice daily.  Patient mainly takes this once daily due to forgetfulness. Previous \"side effect\" to metoprolol - electrical type sensation in his head per patient report  I would like to switch this to something once daily for adherence + a beta-1 selective beta-blocker would be ideal given softer BP  [employer] does list a 30-day supply of bisoprolol at [facility] for $6.95. Look to switch at next visit  SGLT2i: Continue Farxiga 10 mg daily.  Samples were provided at last visit  Provided patient with free 30-day " "trial card (information listed below)  Obtained signature for PAP program given he is uninsured. Will fax completed application  Diuretics: Continue furosemide 40 mg daily.  Ivabradine: Not indicated at this time. Optimize beta-blocker dosing first. If HR is still above goal, it will be difficult to initiate ivabradine until patient has insurance due to cost  Vericiguat:  Optimize core 4 first    Hydralazine/ISDN: Not indicated   Advised patient to call clinic with 2-3 lb weight gain in 24 hrs or >5 lb weight gain in 1 week  Patient was provided with a scale to take home    Farxiga Free 30-Day Trial Offer:  BIN: [id number]  PCN: [id number]  Grp: [id number]  ID: [id number]      2. Methamphetamine Use  Patient stated he quit x 11 days post-hospitalization but he has relapsed  Motivating factors to quit: concern about his health  Reason for relapse: stress and boredom  He states he is using once every 3-4 days. Last use was yesterday  He is not interested at this time in a rehab program  He states he does have naloxone at home      3. Stress/Anxiety  Patient mentioned how stress is a contributing factor to his drug use  Stated he previously used benzos but these were stopped after he failed a drug screen  I discussed trialing something else like sertraline or buspirone. Originally he seemed interested, but when I confirmed with NP she was okay with sending something in and we would send in sertraline, he stated he was not interested in \"any kind of those drugs\"  I discussed with him we would not send in any script for benzos      4. Social Concerns  GERRI referral placed at initial visit. GERRI has been trying to contact patient without luck  Provided patient with phone # for SW and asked he call her  He states his Medicaid is pending but he does not know an update on this  It sounded like he had emergency Medicaid x 30 days. I am not sure that he did any kind of follow-up for this  Him and his wife are in a financial strain as his " wife is the only income  He states they will have housing until Dec. 14  Of note, he did mention he has a gambling addiction in an attempt to get more money      Thank you for allowing me to participate in the care of your patient,         Joan Ruiz, [place]  [facility]  10/16/24  14:24 EDT

## 2024-10-16 NOTE — PROGRESS NOTES
[facility]      Patient Name: Colten Hager  :1970  Age: 53 y.o.  Sex: male  Referring Provider: Lizeth Mazariegos A*   Primary Cardiologist: Dr. Ward  Encounter Provider: Joan Ruiz, PharmD      Chief Complaint:   Chief Complaint   Patient presents with    Congestive Heart Failure       HPI:  Patient presents for their initial pharmacy visit. PMH is significant for HFrEF (EF 21-25%), HTN, HLD, NICM, history of DVT, tobacco use, and sustance abuse. His cardiomyopathy is thought to be due to methamphetamine use. He was initially seen in the [facility] on 10/2/24. He was started on Farxiga and restarted low-dose spironolactone at this visit. His Lasix was also reduced to 40 mg daily from BID dosing. He had follow-up labs on 10/7/24 which were stable. He reports he is tolerating both medications well. No side effects reported. His adherence is pretty good overall, but he misses his evening dose of carvedilol 5x/week due to forgetfulness. He was previously on metoprolol but states he felt like he had an electrical shock type sensation in his head when he was on it.    In relation to his drug use, he states his last use of methamphetamine was yesterday. He quit for 11 days post-hospitalization then relapsed. He is currently using once every 3-4 days which he states is improved from prior when he was using multiple times/day. He does have naloxone at home. He states he restarted using due to stress and boredom. He originally quit because of concern over his health. He is not interested in a rehab program at this time and states he would quit cold turkey.       Current Regimen:  Heart Failure  Carvedilol 3.125 mg BID   Farxiga 10 mg daily  Furosemide 40 mg daily  Losartan 25 mg daily  Spironolactone 25 mg (0.5 tablet) daily      Other CV Meds  None      Medication Use:  Adherence: Variable. Missed 5 or more evening doses in the last week. He does great at taking AM meds.  "Adherence tools used include: pillbox and family member. Barriers for adherence include: patient forgets and psychosocial  Past hx of medication use/intolerance: metoprolol (?flushing)  Affordability: Uninsured  Medicaid pending      Social History:  Tobacco use: current smoker (1 PPD)  has stated he is not interested in using anything to help him quit     EtOH use: none  Illicit drug use:  current methamphetamine use - once every 3-4 days      Immunization Status:  Pneumococcal: PPSV23 (11/2020, 7/2021)  Influenza: due for 2024      OBJECTIVE:    /74 (BP Location: Left arm, Patient Position: Sitting)   Pulse 75   Ht 175.3 cm (69.02\")   Wt 82.4 kg (181 lb 9.6 oz)   SpO2 99%   BMI 26.81 kg/m²     Body mass index is 26.81 kg/m².  Wt Readings from Last 1 Encounters:   10/16/24 82.4 kg (181 lb 9.6 oz)       Lab Review:  Renal Function: Estimated Creatinine Clearance: 78.7 mL/min (by C-G formula based on SCr of 1.25 mg/dL).    Lab Results   Component Value Date    PROBNP 1,083.0 (H) 09/30/2024       Results for orders placed during the hospital encounter of 08/23/24    Adult Transthoracic Echo Complete W/ Cont if Necessary Per Protocol    Interpretation Summary    The left ventricular cavity is severely dilated.    There is severe global hypokinesis. Best antwon segment appears to be the septum    Left ventricular systolic function is severely decreased. Left ventricular ejection fraction appears to be 21 - 25%.    Left ventricular diastolic function is consistent with (grade II w/high LAP) pseudonormalization.    Normal right ventricular cavity size and systolic function noted.    The left atrial cavity is moderately dilated.    Mild to moderate mitral valve regurgitation is present.    Mild tricuspid valve regurgitation is present    Calculated right ventricular systolic pressure from tricuspid regurgitation is 33 mmHg.    There is no evidence of pericardial effusion      ASSESSMENT/PLAN OF " "CARE:    HFrEF (EF 21-25%)  Patient's HF symptoms and weights are stable.  He tolerated Farxiga initiation and spironolactone re-initiation well. His labs were stable on 10/7.  He unfortunately has relapsed and is using methamphetamine currently which is the cause of his HFrEF  MRA: Increase spironolactone to 25 mg daily.  F/u appt and repeat BMP in 1 week  I would like to get his spironolactone to 50 mg daily especially given his LVIDd of 7.2 cm  ACEi/ARB/ARNI: Continue losartan 25 mg daily.  BP is on the softer side today in clinic. He has had no dizziness/lightheadedness  Ideally, I would like to switch this to Entresto but BP may be a limiting factor  BP cuff ordered through the HF fund with the [facility] - will provide to patient at his appt next week and ask him to begin checking BP readings at home  HF Beta-blocker: Continue carvedilol 3.125 mg twice daily.  Patient mainly takes this once daily due to forgetfulness. Previous \"side effect\" to metoprolol - electrical type sensation in his head per patient report  I would like to switch this to something once daily for adherence + a beta-1 selective beta-blocker would be ideal given softer BP  [employer] does list a 30-day supply of bisoprolol at [facility] for $6.95. Look to switch at next visit  SGLT2i: Continue Farxiga 10 mg daily.  Samples were provided at last visit  Provided patient with free 30-day trial card (information listed below)  Obtained signature for PAP program given he is uninsured. Will fax completed application  Diuretics: Continue furosemide 40 mg daily.  Ivabradine: Not indicated at this time. Optimize beta-blocker dosing first. If HR is still above goal, it will be difficult to initiate ivabradine until patient has insurance due to cost  Vericiguat:  Optimize core 4 first    Hydralazine/ISDN: Not indicated   Advised patient to call clinic with 2-3 lb weight gain in 24 hrs or >5 lb weight gain in 1 week  Patient was provided with " "a scale to take home    Farxiga Free 30-Day Trial Offer:  BIN: [id number]  PCN: [id number]  Grp: [id number]  ID: [id number]      2. Methamphetamine Use  Patient stated he quit x 11 days post-hospitalization but he has relapsed  Motivating factors to quit: concern about his health  Reason for relapse: stress and boredom  He states he is using once every 3-4 days. Last use was yesterday  He is not interested at this time in a rehab program  He states he does have naloxone at home      3. Stress/Anxiety  Patient mentioned how stress is a contributing factor to his drug use  Stated he previously used benzos but these were stopped after he failed a drug screen  I discussed trialing something else like sertraline or buspirone. Originally he seemed interested, but when I confirmed with NP she was okay with sending something in and we would send in sertraline, he stated he was not interested in \"any kind of those drugs\"  I discussed with him we would not send in any script for benzos      4. Social Concerns  SW referral placed at initial visit. SW has been trying to contact patient without luck  Provided patient with phone # for SW and asked he call her  He states his Medicaid is pending but he does not know an update on this  It sounded like he had emergency Medicaid x 30 days. I am not sure that he did any kind of follow-up for this  Him and his wife are in a financial strain as his wife is the only income  He states they will have housing until Dec. 14  Of note, he did mention he has a gambling addiction in an attempt to get more money      Thank you for allowing me to participate in the care of your patient,         Joan Ruiz, PharmD  [facility]  10/16/24  14:24 EDT    "

## 2024-12-04 ENCOUNTER — HOSPITAL ENCOUNTER (OUTPATIENT)
Dept: CARDIOLOGY | Facility: HOSPITAL | Age: 54
Discharge: HOME | End: 2024-12-04
Admitting: NURSE PRACTITIONER

## 2024-12-04 VITALS
OXYGEN SATURATION: 99 % | HEIGHT: 69 IN | DIASTOLIC BLOOD PRESSURE: 91 MMHG | WEIGHT: 181.6 LBS | BODY MASS INDEX: 26.9 KG/M2 | SYSTOLIC BLOOD PRESSURE: 128 MMHG | HEART RATE: 87 BPM

## 2024-12-04 DIAGNOSIS — I42.0 NONISCHEMIC DILATED CARDIOMYOPATHY: ICD-10-CM

## 2024-12-04 DIAGNOSIS — I50.22 CHRONIC HFREF (HEART FAILURE WITH REDUCED EJECTION FRACTION): Primary | ICD-10-CM

## 2024-12-04 PROCEDURE — G0463 HOSPITAL OUTPT CLINIC VISIT: HCPCS

## 2024-12-04 PROCEDURE — 94726 PLETHYSMOGRAPHY LUNG VOLUMES: CPT | Performed by: NURSE PRACTITIONER

## 2024-12-04 RX ORDER — LOSARTAN POTASSIUM 25 MG/1
25 TABLET ORAL DAILY
Qty: 30 TABLET | Refills: 0 | Status: SHIPPED | OUTPATIENT
Start: 2024-12-04

## 2024-12-04 RX ORDER — CARVEDILOL 3.12 MG/1
3.12 TABLET ORAL EVERY 12 HOURS SCHEDULED
Qty: 60 TABLET | Refills: 0 | Status: SHIPPED | OUTPATIENT
Start: 2024-12-04

## 2024-12-04 RX ORDER — FUROSEMIDE 40 MG/1
40 TABLET ORAL DAILY
Start: 2024-12-04 | End: 2024-12-10 | Stop reason: SDUPTHER

## 2024-12-04 RX ORDER — SPIRONOLACTONE 25 MG/1
25 TABLET ORAL DAILY
Qty: 30 TABLET | Refills: 0 | Status: SHIPPED | OUTPATIENT
Start: 2024-12-04

## 2024-12-04 NOTE — LETTER
December 10, 2024     No Recipients    Patient: Colten Hager   YOB: 1970   Date of Visit: 12/4/2024     Dear Michael Ward MD:       Thank you for referring Colten Hager to me for evaluation. Below are the relevant portions of my assessment and plan of care.    If you have questions, please do not hesitate to call me. I look forward to following Colten along with you.         Sincerely,        DIANA Waldrop        CC: No Recipients    Delilah DIANA Mcclure  12/10/24 9211  Signed    [facility]    Michael Ward MD  [address]  [address]  [place] [address]    Thank you for asking me to see Colten Hager.     Congestive Heart Failure  Associated symptoms include shortness of breath.       1. NICM/HFrEF    Subjective  Mr. Colten Hager is a 53 year old male that presents to the Heart Failure clinic for initial evaluation of chronic NICM/HFrEF.   He has a history of nonischemic cardiomyopathy, hypertension, DVT and methamphetamine use.   He is followed by [facility] Dr Ward.   His cardiomyopathy has been felt to be due to chornic/contiued methamphetamine use. He has previously been admives to stop drug use and start on medical therapy.   Unfortunately,  he continued meth use.  Hospitalization 8/23/2024-8/25/2024 for acute appendicitis, he had 2D TTE during this hospitalization which showed-There is severe global hypokinesis. LVEF 21 - 25%.  Left ventricular diastolic function is consistent with (grade II w/high LAP) pseudonormalization.  Normal right ventricular cavity size and systolic function noted. The left atrial cavity is moderately dilated.  Mild to moderate mitral valve regurgitation is present. Mild tricuspid valve regurgitation is present. RVSP 33 mmHg.    ADHF hospitalization 9/29/2024. He received IV diuretics, and his prior regimen of 80 mg oral Lasix daily along with his prior ARB and carvedilol were continued at discharge.  His aldactone was held at discharge until follow-up  He states he does want to stop meth use and has not used since most recent hospitalization 9/29. He presents to clinic with his wife who states they are currently under a great deal of financial strain as she is the only income for them and they are at risk of getting evicted from their current home.   Additionally he is very concerned because he does not have any insurance. They are working to get medicaid    Today Mr Hager presents to the clinic with concerns of needing refills. He had missed appointments recently and is also asking for help with housing as he is being evicted. He admits to going back to using methamphetamines.   He denies any new or worsening shortness of breath. Reports he does have chronic unchanges exertional dyspnea and exercise intolerance        Review of Systems - Review of Systems   Constitutional: Negative for weight gain and weight loss.   Cardiovascular:  Positive for dyspnea on exertion. Negative for leg swelling.   Respiratory:  Positive for shortness of breath.           Patient Active Problem List   Diagnosis   • Essential hypertension   • Substance abuse   • Dyslipidemia   • GERD without esophagitis   • Methamphetamine abuse   • IV drug abuse   • Tobacco abuse   • Chronic HFrEF (heart failure with reduced ejection fraction)   • Acute deep vein thrombosis (DVT) of calf muscle vein of left lower extremity   • Nonischemic dilated cardiomyopathy   • History of DVT of lower extremity   • Acute appendicitis with localized peritonitis, without perforation or abscess   • CHF (congestive heart failure)     family history is not on file.   reports that he has been smoking cigarettes. He has never used smokeless tobacco. He reports current drug use. Drug: Methamphetamines. He reports that he does not drink alcohol.  No Known Allergies  Physical Activity: Not on file          Current Outpatient Medications:   •  carvedilol (COREG) 3.125 MG  tablet, Take 1 tablet by mouth Every 12 (Twelve) Hours., Disp: 60 tablet, Rfl: 0  •  dapagliflozin Propanediol (Farxiga) 10 MG tablet, Take 10 mg by mouth Daily., Disp: 30 tablet, Rfl: 0  •  losartan (COZAAR) 25 MG tablet, Take 1 tablet by mouth Daily. NEEDS AN APPOINTMENT FOR FUTURE REFILLS, Disp: 30 tablet, Rfl: 0  •  spironolactone (ALDACTONE) 25 MG tablet, Take 1 tablet by mouth Daily., Disp: 30 tablet, Rfl: 0  •  furosemide (Lasix) 40 MG tablet, Take 1 tablet by mouth Daily for 60 days., Disp: 30 tablet, Rfl: 1    Objective  Vital Sign Review:   Vitals:    12/04/24 1430   BP: 128/91   Pulse: 87   SpO2: 99%       Body mass index is 26.81 kg/m².      12/04/24  1430   Weight: 82.4 kg (181 lb 9.6 oz)     Physical Exam:  Constitutional:       Appearance: Normal and healthy appearance.   Neck:      Vascular: No carotid bruit or JVD. JVD normal.   Pulmonary:      Effort: Pulmonary effort is normal.      Breath sounds: Normal breath sounds.   Cardiovascular:      PMI at left midclavicular line. Normal rate. Regular rhythm.      Murmurs: There is a systolic murmur.   Pulses:     Radial: 2+ bilaterally.  Edema:     Peripheral edema absent.   Abdominal:      Palpations: Abdomen is soft.      Tenderness: There is no abdominal tenderness.   Skin:     General: Skin is warm and dry.   Neurological:      General: No focal deficit present.      Mental Status: Alert and oriented to person, place and time.   Psychiatric:         Behavior: Behavior is cooperative.          DATA REVIEWED:   EKG:      ---------------------------------------------------  ECHO:    Results for orders placed during the hospital encounter of 08/23/24    Adult Transthoracic Echo Complete W/ Cont if Necessary Per Protocol    Interpretation Summary  •  The left ventricular cavity is severely dilated.  •  There is severe global hypokinesis. Best antwon segment appears to be the septum  •  Left ventricular systolic function is severely decreased. Left  ventricular ejection fraction appears to be 21 - 25%.  •  Left ventricular diastolic function is consistent with (grade II w/high LAP) pseudonormalization.  •  Normal right ventricular cavity size and systolic function noted.  •  The left atrial cavity is moderately dilated.  •  Mild to moderate mitral valve regurgitation is present.  •  Mild tricuspid valve regurgitation is present  •  Calculated right ventricular systolic pressure from tricuspid regurgitation is 33 mmHg.  •  There is no evidence of pericardial effusion          -----------------------------------------------------  RHC/LHC    Results for orders placed during the hospital encounter of 11/15/20    Cardiac Catheterization/Vascular Study    Conclusion  · Severe systolic dysfunction.  · 24/ 1.  Dilated cardiomyopathy:  Etiology: Uncertain  Anatomy: Normal coronary arteries, severely dilated left ventricle  Physiology: Poor global left ventricular function, mitral regurgitation, congestive heart failure  Functional status: Severely compromised  Prognosis: Guarded      ---------------------------------------------------------------------------    CT:   No radiology results for the last 30 days.        --------------------------------------------------------------------------------------------------------------    Laboratory evaluations:  Renal Function: CrCl cannot be calculated (Patient's most recent lab result is older than the maximum 30 days allowed.).    Lab Results   Component Value Date    GLUCOSE 81 10/07/2024    BUN 16 10/07/2024    CREATININE 1.25 10/07/2024    EGFRIFNONA 74 09/01/2021    EGFRIFAFRI >60 11/14/2020    BCR 12.8 10/07/2024    K 4.1 10/07/2024    CO2 28.3 10/07/2024    CALCIUM 9.1 10/07/2024    ALBUMIN 3.7 09/29/2024    LABIL2 1.1 11/14/2020    AST 13 09/29/2024    ALT 11 09/29/2024     Lab Results   Component Value Date    WBC 7.07 09/30/2024    HGB 13.5 09/30/2024    HCT 40.5 09/30/2024    MCV 92.3 09/30/2024      "09/30/2024     No results found for: \"HGBA1C\"  No results found for: \"HGBA1C\"  Lab Results   Component Value Date    CREATININE 1.25 10/07/2024     No results found for: \"IRON\", \"TIBC\", \"FERRITIN\"    Result Review:  I have personally reviewed the results from the time of this admission to 12/10/2024 23:25 EST and agree with these findings:  [x]  Laboratory list / accordion  []  Microbiology  [x]  Radiology  [x]  EKG/Telemetry   [x]  Cardiology/Vascular   []  Pathology  [x]  Old records  []  Other:  Most notable findings include:   Note last admission  \"Mr. Hager is a 53 y.o. gentleman with past medical history notable for nonischemic cardiomyopathy, hypertension, history of DVT, tobacco abuse, and ongoing methamphetamine use who presents to the emergency room with worsening shortness of breath and dyspnea on exertion.  He has been without Lasix for a number of weeks.  He reports that he did have access to his other medications possibly through our office may be through another nurse practitioner he had seen although somewhat questionable.  Nonetheless he has been restarted on his prior regimen.  Previously this is done a reasonable job with controlling his symptoms.  The main issue though is his ongoing methamphetamine use.  He admitted to using methamphetamine on 9/28.  Our emergency room did not get a drug screen not sure why but nonetheless he does admit to using methamphetamines likely the reason for his ongoing and persistent cardiomyopathy and does limit options to better treat him most notably further consideration for primary prevention ICD which would not be safe to implant in somebody with ongoing methamphetamine use.  I think if he were to get off of drugs his heart would likely recover this is usually the case not impossible that he has not another reason for his cardiomyopathy or an intrinsic nonischemic cardiomyopathy but again optimization of his medical regimen has been limited due to patient " "compliance follow-up and again ongoing drug use.  Hopefully we can get him off of drugs but he is really been hesitant to do so we have talked to him for the last 4 years about getting off drugs he is started to experience the complications of this with loss of dentition he appears frail and weak and now has no insurance due to not being able to keep a job all of which is related to his ongoing drug use unfortunately.  From our standpoint he is doing better would restart his prior regimen of 80 mg oral Lasix daily along with his prior ARB and carvedilol.  Previously on Aldactone would probably hold this until he is seen in follow-up.  Probably would not be unreasonable to have him referred to our heart failure clinic which does have some good resources as well anyway to get him appropriate follow-up would be great.  He might benefit from further IV diuresis today he was hoping to get out I would say if he walks around and does not have any oxygen needs he will do well with continued diuresis transition to oral diuretics and can follow-up with us as an outpatient. \"       PH Screening:  The patientwas screened today for PH.  The patient's last 2D TTE showed the patient does not currently have PH.         Sleep Evaluation:  We will need to discuss sleep evaluation at future visit.         6 MINUTE WALK   He refused 6min walk                    Cardiac Amyloid Screening Questions   Cardiac Amyloid Screening Questions     Does the patient experience or have a diagnosis of any of the following:    HF: yes  Aortic stenosis: no  Afib: no  Heart block: no  Peripheral neuropathy: no  Orthostatic hypotension: no  Renal dysfunction: no  Carpal tunnel syndrome: no  Lumbar spinal stenosis: yes  Proteinuria: yes  Unexpected/unintentional weight loss: no  Frequent nausea/early satiety: no  Significant episodes of diarrhea/constipation: no        -ICD/CRT-D:   Current ICD/CRT-D indications:   *ICM with LVEF less than or equal to " 35% due to prior myocardial infarction who are at least 40 days post-myocardial infarction and are in NYHA functional Class II or III.   *ICM due to prior myocardial infarction who are at least 40 days post-myocardial infarction, have an LVEF less than or equal to 30%, and are in NYHA functional Class I.   *NICM with LVEF less than or equal to 35% and who are in NYHA functional Class II or III. These patients should be on GDMT for at least 90 days with another LV EF assessment showing LV EF<35%.      [email or web address]    ------------------------------------------------------------------  Today, the patient was assessed for ICD/CRT-D therapy. ICD would be indicated as he has had continued depressed EF, however the patient does have continued methamphetamine use and there is concern about safety of ICD implantation in current/active drug user        AHF:   Around 10% of HF patients will need AHF. ALL patients should be screened at EACH VISIT for this indication. The INEEDHELP mnemonic is commonly utilized to determine if the patient needs an assessment for AHF therapies including inotropes at home, consideration for OHT/VAD.    Referral to HF Specialist: Uses the acronym I-NEED-HELP.  I: Intravenous inotropes  N: New York Heart Association (NYHA) class IIIB/IV or persistently elevated natriuretic peptides  E: End-organ dysfunction  E: EF <=35%  D: Defibrillator shocks  H: Hospitalizations >1  E: Edema despite escalating diuretics  L: Low systolic BP <=90, high heart rate  P: Prognostic medication; progressive intolerance or down-titration of guideline-directed medical therapy [GDMT])    -The patient was screened today for AHF and does meet indications as he has had continued reduced EF, discussed with the patient that he must stop using drugs. We will discuss at next visit          ReDS VOLUMETRIC ASSESSMENT:  ReDs Vest    Performed by: Lizeth Mazariegos,  "APRN  Authorized by: Lizeth Mazariegos APRN    ReDS value:  38  ReDS Value Description:  36-41 (orange) = Possible Hypervolemic Status        Assessment & Plan     1. Chronic HFrEF (heart failure with reduced ejection fraction)    2. Nonischemic dilated cardiomyopathy        1-2 NICM/HFrEF--EF 21-25%. Pt appears euvolemic.   Increase spironolactone today to 25mg daily. Repeat BMP in 5 days. We will work to get spironolactone to 50 mg daily especially given his LVIDd of 7.2 cm  Referral placed for .   Continue losartan 25 mg daily.  Continue carvedilol 3.125 mg twice daily. Previous \"side effect\" to metoprolol - electrical type sensation in his head per patient report  Continue furosemide 40 mg daily.     NYHA stage C FC-III-IV     Clinical status was assessed and has worsened.  Treatment intent: curative   ReDS reading was obtained today.  ReDS result: 38       Today, Patient appears euvolemic. and with perfusion. The patient's hemodynamics are currently acceptable. HR is: normal and is at goal. BP/MAP was reviewed and there is room for medication up-titration.  The patient's clinical course has been impacted by drug use. LVEF: 21-25%.     GDMT Assessment: The patient is currently on quadruple therapy (with addition of medications today).      GDMT changes recommended today: increase spironolactone    BB:   continue Carvedilol  3.125mg bid  Monitor heart rate.  Please call the [facility] for HR<50, dizziness, lightheadedness, syncope    A/A/A:     continue  Losartan  25mg daily  Occasional monitoring of Chem-7 is recommended; call for the development of a new cough or S/Sx angioedema    SGLT2-I:  continue Dapagliflozin (Farxiga) 10mg daily  Call for S/Sx genital mycotic infections  Do not take when inadequate oral intake, NPO, GI illness    MRA:  The patient is FC-NYHA Class III and MRA is indicated.   increase Spironolactone to  25mg daily  Will check BMP in 5 days.       -DIURETIC:   furosemide (LASIX) " 40 mg two times daily  -Fluid restriction:   -requested  -2000 ml  -Patient has been asked to weigh daily and was provided with a printed diuretic strategy.  -Sodium restriction:   -1,500 mg Na restriction was discussed.      Labs/Diagnostics/Referrals:    Labs -Chem-7-in 5 days       Lifestyle Advice:   - call office if I gain more than 2 pounds in one day or 5 pounds in one week   - keep legs up while sitting   - use salt in moderation   - watch for swelling in feet, ankles and legs every day   - weigh myself daily   -call for concerning s/sx   -- activity or exercise based on tolerance encouraged     CODE STATUS: FULL              Return in about 1 week (around 12/11/2024).                Thank you for allowing me to participate in the care of your patient,         Lizeth Mazariegos, APRN    12/10/24  14:42 EDT      **Felipe Disclaimer:**  Much of this encounter note is an electronic transcription/translation of spoken language to printed text. The electronic translation of spoken language may permit erroneous, or at times, nonsensical words or phrases to be inadvertently transcribed. Although I have reviewed the note for such errors, some may still exist.    The information in this note was reviewed and updated during the visit to be as accurate as possible. As many patients have chronic medical problems, many of their individual problems and ongoing plans do not change significantly from visit to visit.  This information is correct and is consistent with my treatment plan as of today's visit.

## 2024-12-04 NOTE — PROGRESS NOTES
[facility]    Michael Ward MD  [address]  [address]  [place] [address]    Thank you for asking me to see Colten Hager.     Congestive Heart Failure  Associated symptoms include shortness of breath.       1. NICM/HFrEF    Subjective   Mr. Colten Hager is a 53 year old male that presents to the Heart Failure clinic for initial evaluation of chronic NICM/HFrEF.   He has a history of nonischemic cardiomyopathy, hypertension, DVT and methamphetamine use.   He is followed by G Dr Ward.   His cardiomyopathy has been felt to be due to chornic/contiued methamphetamine use. He has previously been admives to stop drug use and start on medical therapy.   Unfortunately,  he continued meth use.  Hospitalization 8/23/2024-8/25/2024 for acute appendicitis, he had 2D TTE during this hospitalization which showed-There is severe global hypokinesis. LVEF 21 - 25%.  Left ventricular diastolic function is consistent with (grade II w/high LAP) pseudonormalization.  Normal right ventricular cavity size and systolic function noted. The left atrial cavity is moderately dilated.  Mild to moderate mitral valve regurgitation is present. Mild tricuspid valve regurgitation is present. RVSP 33 mmHg.    He presents to clinic today after ADHF hospitalization 9/29/2024. He received IV diuretics, and his prior regimen of 80 mg oral Lasix daily along with his prior ARB and carvedilol were continued at discharge. His aldactone was held at discharge until follow-up  He states he does want to stop meth use and has not used since most recent hospitalization 9/29. He presents to clinic with his wife who states they are currently under a great deal of financial strain as she is the only income for them and they are at risk of getting evicted from their current home.   Additionally he is very concerned because he does not have any insurance. They are working to get medicaid    Review of Systems -  Review of Systems   Constitutional: Negative for weight gain and weight loss.   Cardiovascular:  Positive for dyspnea on exertion. Negative for leg swelling.   Respiratory:  Positive for shortness of breath.           Patient Active Problem List   Diagnosis    Essential hypertension    Substance abuse    Dyslipidemia    GERD without esophagitis    Methamphetamine abuse    IV drug abuse    Tobacco abuse    Chronic HFrEF (heart failure with reduced ejection fraction)    Acute deep vein thrombosis (DVT) of calf muscle vein of left lower extremity    Nonischemic dilated cardiomyopathy    History of DVT of lower extremity    Acute appendicitis with localized peritonitis, without perforation or abscess    CHF (congestive heart failure)     family history is not on file.   reports that he has been smoking cigarettes. He has never used smokeless tobacco. He reports current drug use. Drug: Methamphetamines. He reports that he does not drink alcohol.  No Known Allergies  Physical Activity: Not on file          Current Outpatient Medications:     carvedilol (COREG) 3.125 MG tablet, Take 1 tablet by mouth Every 12 (Twelve) Hours. (Patient taking differently: Take 1 tablet by mouth Daily.), Disp: 60 tablet, Rfl: 0    dapagliflozin Propanediol (Farxiga) 10 MG tablet, Take 10 mg by mouth Daily., Disp: 30 tablet, Rfl: 0    furosemide (Lasix) 40 MG tablet, Take 1 tablet by mouth Daily for 60 days., Disp: , Rfl:     losartan (COZAAR) 25 MG tablet, Take 1 tablet by mouth Daily. NEEDS AN APPOINTMENT FOR FUTURE REFILLS, Disp: 30 tablet, Rfl: 0    spironolactone (ALDACTONE) 25 MG tablet, Take 1 tablet by mouth Daily. (Patient taking differently: Take 0.5 tablets by mouth Daily.), Disp: 30 tablet, Rfl: 0    Objective   Vital Sign Review:   There were no vitals filed for this visit.    Body mass index is 26.81 kg/m².      12/04/24  1430   Weight: 82.4 kg (181 lb 9.6 oz)     Physical Exam:  Constitutional:       Appearance: Normal and  healthy appearance.   Neck:      Vascular: No carotid bruit or JVD. JVD normal.   Pulmonary:      Effort: Pulmonary effort is normal.      Breath sounds: Normal breath sounds.   Cardiovascular:      PMI at left midclavicular line. Normal rate. Regular rhythm.      Murmurs: There is a systolic murmur.   Pulses:     Radial: 2+ bilaterally.  Edema:     Peripheral edema absent.   Abdominal:      Palpations: Abdomen is soft.      Tenderness: There is no abdominal tenderness.   Skin:     General: Skin is warm and dry.   Neurological:      General: No focal deficit present.      Mental Status: Alert and oriented to person, place and time.   Psychiatric:         Behavior: Behavior is cooperative.          DATA REVIEWED:   EKG:      ---------------------------------------------------  ECHO:    Results for orders placed during the hospital encounter of 08/23/24    Adult Transthoracic Echo Complete W/ Cont if Necessary Per Protocol    Interpretation Summary    The left ventricular cavity is severely dilated.    There is severe global hypokinesis. Best antwon segment appears to be the septum    Left ventricular systolic function is severely decreased. Left ventricular ejection fraction appears to be 21 - 25%.    Left ventricular diastolic function is consistent with (grade II w/high LAP) pseudonormalization.    Normal right ventricular cavity size and systolic function noted.    The left atrial cavity is moderately dilated.    Mild to moderate mitral valve regurgitation is present.    Mild tricuspid valve regurgitation is present    Calculated right ventricular systolic pressure from tricuspid regurgitation is 33 mmHg.    There is no evidence of pericardial effusion          -----------------------------------------------------  RHC/LHC    Results for orders placed during the hospital encounter of 11/15/20    Cardiac Catheterization/Vascular Study    Conclusion  · Severe systolic dysfunction.  · 24/ 1.  Dilated  "cardiomyopathy:  Etiology: Uncertain  Anatomy: Normal coronary arteries, severely dilated left ventricle  Physiology: Poor global left ventricular function, mitral regurgitation, congestive heart failure  Functional status: Severely compromised  Prognosis: Guarded      ---------------------------------------------------------------------------    CT:   No radiology results for the last 30 days.        --------------------------------------------------------------------------------------------------------------    Laboratory evaluations:  Renal Function: CrCl cannot be calculated (Patient's most recent lab result is older than the maximum 30 days allowed.).    Lab Results   Component Value Date    GLUCOSE 81 10/07/2024    BUN 16 10/07/2024    CREATININE 1.25 10/07/2024    EGFRIFNONA 74 09/01/2021    EGFRIFAFRI >60 11/14/2020    BCR 12.8 10/07/2024    K 4.1 10/07/2024    CO2 28.3 10/07/2024    CALCIUM 9.1 10/07/2024    ALBUMIN 3.7 09/29/2024    LABIL2 1.1 11/14/2020    AST 13 09/29/2024    ALT 11 09/29/2024     Lab Results   Component Value Date    WBC 7.07 09/30/2024    HGB 13.5 09/30/2024    HCT 40.5 09/30/2024    MCV 92.3 09/30/2024     09/30/2024     No results found for: \"HGBA1C\"  No results found for: \"HGBA1C\"  Lab Results   Component Value Date    CREATININE 1.25 10/07/2024     No results found for: \"IRON\", \"TIBC\", \"FERRITIN\"    Result Review:  I have personally reviewed the results from the time of this admission to 12/4/2024 14:50 EST and agree with these findings:  [x]  Laboratory list / accordion  []  Microbiology  [x]  Radiology  [x]  EKG/Telemetry   [x]  Cardiology/Vascular   []  Pathology  [x]  Old records  []  Other:  Most notable findings include:   Note last admission  \"Mr. Hager is a 53 y.o. gentleman with past medical history notable for nonischemic cardiomyopathy, hypertension, history of DVT, tobacco abuse, and ongoing methamphetamine use who presents to the emergency room with worsening " shortness of breath and dyspnea on exertion.  He has been without Lasix for a number of weeks.  He reports that he did have access to his other medications possibly through our office may be through another nurse practitioner he had seen although somewhat questionable.  Nonetheless he has been restarted on his prior regimen.  Previously this is done a reasonable job with controlling his symptoms.  The main issue though is his ongoing methamphetamine use.  He admitted to using methamphetamine on 9/28.  Our emergency room did not get a drug screen not sure why but nonetheless he does admit to using methamphetamines likely the reason for his ongoing and persistent cardiomyopathy and does limit options to better treat him most notably further consideration for primary prevention ICD which would not be safe to implant in somebody with ongoing methamphetamine use.  I think if he were to get off of drugs his heart would likely recover this is usually the case not impossible that he has not another reason for his cardiomyopathy or an intrinsic nonischemic cardiomyopathy but again optimization of his medical regimen has been limited due to patient compliance follow-up and again ongoing drug use.  Hopefully we can get him off of drugs but he is really been hesitant to do so we have talked to him for the last 4 years about getting off drugs he is started to experience the complications of this with loss of dentition he appears frail and weak and now has no insurance due to not being able to keep a job all of which is related to his ongoing drug use unfortunately.  From our standpoint he is doing better would restart his prior regimen of 80 mg oral Lasix daily along with his prior ARB and carvedilol.  Previously on Aldactone would probably hold this until he is seen in follow-up.  Probably would not be unreasonable to have him referred to our heart failure clinic which does have some good resources as well anyway to get him  "appropriate follow-up would be great.  He might benefit from further IV diuresis today he was hoping to get out I would say if he walks around and does not have any oxygen needs he will do well with continued diuresis transition to oral diuretics and can follow-up with us as an outpatient. \"       PH Screening:  The patientwas screened today for PH.  The patient's last 2D TTE showed the patient does not currently have PH.         Sleep Evaluation:  We will need to discuss sleep evaluation at future visit.         6 MINUTE WALK   He refused 6min walk                    Cardiac Amyloid Screening Questions   Cardiac Amyloid Screening Questions     Does the patient experience or have a diagnosis of any of the following:    HF: yes  Aortic stenosis: no  Afib: no  Heart block: no  Peripheral neuropathy: no  Orthostatic hypotension: no  Renal dysfunction: no  Carpal tunnel syndrome: no  Lumbar spinal stenosis: yes  Proteinuria: yes  Unexpected/unintentional weight loss: no  Frequent nausea/early satiety: no  Significant episodes of diarrhea/constipation: no        -ICD/CRT-D:   Current ICD/CRT-D indications:   *ICM with LVEF less than or equal to 35% due to prior myocardial infarction who are at least 40 days post-myocardial infarction and are in NYHA functional Class II or III.   *ICM due to prior myocardial infarction who are at least 40 days post-myocardial infarction, have an LVEF less than or equal to 30%, and are in NYHA functional Class I.   *NICM with LVEF less than or equal to 35% and who are in NYHA functional Class II or III. These patients should be on GDMT for at least 90 days with another LV EF assessment showing LV EF<35%.      [email or web address]    ------------------------------------------------------------------  Today, the patient was assessed for ICD/CRT-D therapy. ICD would be indicated as he has had continued depressed EF, however the patient does have " "continued methamphetamine use and there is concern about safety of ICD implantation in current/active drug user        AHF:   Around 10% of HF patients will need AHF. ALL patients should be screened at EACH VISIT for this indication. The INEEDHELP mnemonic is commonly utilized to determine if the patient needs an assessment for AHF therapies including inotropes at home, consideration for OHT/VAD.    Referral to HF Specialist: Uses the acronym I-NEED-HELP.  I: Intravenous inotropes  N: New York Heart Association (NYHA) class IIIB/IV or persistently elevated natriuretic peptides  E: End-organ dysfunction  E: EF <=35%  D: Defibrillator shocks  H: Hospitalizations >1  E: Edema despite escalating diuretics  L: Low systolic BP <=90, high heart rate  P: Prognostic medication; progressive intolerance or down-titration of guideline-directed medical therapy [GDMT])    -The patient was screened today for AHF and does meet indications as he has had continued reduced EF, discussed with the patient that he must stop using drugs. We will discuss at next visit          ReDS VOLUMETRIC ASSESSMENT:  ReDs Vest    Performed by: Lizeth Mazariegos APRN  Authorized by: Lizeth Mazariegos APRN    ReDS value:  38  ReDS Value Description:  36-41 (orange) = Possible Hypervolemic Status        Assessment & Plan      No diagnosis found.    1-2 NICM/HFrEF--EF 21-25%. Pt appears euvolemic.   He states he stopped using meth---he has not used meth since he was discharged from the hospital. (A couple days ago)  He dneies any other drug or alcohol use.   Still smokes cigarettes-encouarged cessation--he is not interested in anything to help stopping smoking at this time.   Has had reported \"reaction to metoprolol\" reports some questionable flushing like symptoms/numbness/electrical shock from head to toes. Does not appear to be a true reaction, we may need to consider trial again in order to allow more Blood pressure to start entresto  We will " "start farxiga--patient will need PAP as he currently does not have any insurance coverage.   He has previously been on spironolactone, we will start spironolactone today. Repeat BMP in 5 days.   Referral placed for .   We will provide LYFT rides to and from hf clinic appointments and required lab draws. He verbalized understanding.   Directions for when to call the clinic reviewed with the patient to include weight gain of 2 to 3 pounds in 24 hours, weight gain of 5 to 10 pounds within 7 days; worsening shortness of breath; worsening lower extremity edema or abdominal distention.    Low risk screening for amyloidosis--offered labs/imaging to rule out amyloidosis--pt declines right now    The KCCQ-12 was updated at the visit today.  score:  16 .     HFrEF (EF 21-25%)  Patient's HF symptoms and weights are stable.  He tolerated Farxiga initiation and spironolactone re-initiation well. His labs were stable on 10/7.  He unfortunately has relapsed and is using methamphetamine currently which is the cause of his HFrEF  MRA: Increase spironolactone to 25 mg daily.  F/u appt and repeat BMP in 1 week  I would like to get his spironolactone to 50 mg daily especially given his LVIDd of 7.2 cm  ACEi/ARB/ARNI: Continue losartan 25 mg daily.  BP is on the softer side today in clinic. He has had no dizziness/lightheadedness  Ideally, I would like to switch this to Entresto but BP may be a limiting factor  BP cuff ordered through the HF fund with the [facility] - will provide to patient at his appt next week and ask him to begin checking BP readings at home  HF Beta-blocker: Continue carvedilol 3.125 mg twice daily.  Patient mainly takes this once daily due to forgetfulness. Previous \"side effect\" to metoprolol - electrical type sensation in his head per patient report  I would like to switch this to something once daily for adherence + a beta-1 selective beta-blocker would be ideal given softer " "BP  [facility] does list a 30-day supply of bisoprolol at [facility] for $6.95. Look to switch at next visit  SGLT2i: Continue Farxiga 10 mg daily.  Samples were provided at last visit  Provided patient with free 30-day trial card (information listed below)  Obtained signature for PAP program given he is uninsured. Will fax completed application  Diuretics: Continue furosemide 40 mg daily.  Ivabradine: Not indicated at this time. Optimize beta-blocker dosing first. If HR is still above goal, it will be difficult to initiate ivabradine until patient has insurance due to cost  Vericiguat:  Optimize core 4 first    Hydralazine/ISDN: Not indicated   Advised patient to call clinic with 2-3 lb weight gain in 24 hrs or >5 lb weight gain in 1 week  Patient was provided with a scale to take home     Farxiga Free 30-Day Trial Offer:  BIN: [id number]  PCN: [id number]  Grp: [id number]  ID: [id number]        2. Methamphetamine Use  Patient stated he quit x 11 days post-hospitalization but he has relapsed  Motivating factors to quit: concern about his health  Reason for relapse: stress and boredom  He states he is using once every 3-4 days. Last use was yesterday  He is not interested at this time in a rehab program  He states he does have naloxone at home        3. Stress/Anxiety  Patient mentioned how stress is a contributing factor to his drug use  Stated he previously used benzos but these were stopped after he failed a drug screen  I discussed trialing something else like sertraline or buspirone. Originally he seemed interested, but when I confirmed with NP she was okay with sending something in and we would send in sertraline, he stated he was not interested in \"any kind of those drugs\"  I discussed with him we would not send in any script for benzos        4. Social Concerns  SW referral placed at initial visit. SW has been trying to contact patient without luck  Provided patient with phone # for SW and asked he call her  He states his " Medicaid is pending but he does not know an update on this  It sounded like he had emergency Medicaid x 30 days. I am not sure that he did any kind of follow-up for this  Him and his wife are in a financial strain as his wife is the only income  He states they will have housing until Dec. 14  Of note, he did mention he has a gambling addiction in an attempt to get more money       NYHA stage C FC-III-IV     Clinical status was assessed and has worsened.  Treatment intent: curative   ReDS reading was obtained today.  ReDS result: 38       Today, Patient appears euvolemic. and with perfusion. The patient's hemodynamics are currently acceptable. HR is: normal and is at goal. BP/MAP was reviewed and there is room for medication up-titration.  The patient's clinical course has been impacted by drug use. LVEF: 21-25%.     GDMT Assessment: The patient is currently on quadruple therapy (with addition of medications today).      GDMT changes recommended today:  restart MRA, start SGLT2      BB:   continue Carvedilol  3.125mg bid  Monitor heart rate.  Please call the [facility] for HR<50, dizziness, lightheadedness, syncope    A/A/A:     continue  Losartan  25mg daily  Occasional monitoring of Chem-7 is recommended; call for the development of a new cough or S/Sx angioedema    SGLT2-I:  continue Dapagliflozin (Farxiga) 10mg daily  Call for S/Sx genital mycotic infections  Do not take when inadequate oral intake, NPO, GI illness    MRA:  The patient is FC-NYHA Class III and MRA is indicated.   increase Spironolactone to  25mg daily  Will check BMP in 5 days.       -DIURETIC:   furosemide (LASIX) 40 mg two times daily  -Fluid restriction:   -requested  -2000 ml  -Patient has been asked to weigh daily and was provided with a printed diuretic strategy.  -Sodium restriction:   -1,500 mg Na restriction was discussed.      Labs/Diagnostics/Referrals:    Labs -Chem-7-in 5 days       Lifestyle Advice:   - call office if I gain more than 2  pounds in one day or 5 pounds in one week   - keep legs up while sitting   - use salt in moderation   - watch for swelling in feet, ankles and legs every day   - weigh myself daily   -call for concerning s/sx   -- activity or exercise based on tolerance encouraged     CODE STATUS: FULL              No follow-ups on file.                Thank you for allowing me to participate in the care of your patient,         Lizeth Mazariegos, APRN    12/04/24  14:42 EDT      **Felipe Disclaimer:**  Much of this encounter note is an electronic transcription/translation of spoken language to printed text. The electronic translation of spoken language may permit erroneous, or at times, nonsensical words or phrases to be inadvertently transcribed. Although I have reviewed the note for such errors, some may still exist.    The information in this note was reviewed and updated during the visit to be as accurate as possible. As many patients have chronic medical problems, many of their individual problems and ongoing plans do not change significantly from visit to visit.  This information is correct and is consistent with my treatment plan as of today's visit.

## 2024-12-04 NOTE — PATIENT INSTRUCTIONS
Directions for when to call the clinic reviewed with the patient to include weight gain of 2 to 3 pounds in 24 hours, weight gain of 5 to 10 pounds within 7 days; worsening shortness of breath; worsening lower extremity edema or abdominal distention.    Increase spironolactone to 25mg daily, we will need to check BMP in 5 days.

## 2024-12-05 ENCOUNTER — REFERRAL TRIAGE (OUTPATIENT)
Age: 54
End: 2024-12-05

## 2024-12-06 ENCOUNTER — PATIENT OUTREACH (OUTPATIENT)
Age: 54
End: 2024-12-06

## 2024-12-06 NOTE — OUTREACH NOTE
Social Work Assessment  Questions/Answers      Flowsheet Row Most Recent Value   Referral Source physician, outpatient staff, outpatient clinic   Reason for Consult community resources, housing concerns/homeless, financial concerns   Preferred Language English   Advance Care Planning Reviewed no concerns identified   People in Home spouse   Current Living Arrangements apartment   Potentially Unsafe Housing Conditions none   In the past 12 months has the electric, gas, oil, or water company threatened to shut off services in your home? No   Primary Care Provided by self   Source of Income unable to assess   Application for Public Assistance pending public assistance pending number   Medications independent   Meal Preparation independent   Housekeeping independent   Laundry independent   Shopping independent   If for any reason you need help with day-to-day activities such as bathing, preparing meals, shopping, managing finances, etc., do you get the help you need? I don't need any help   Major Change/Loss/Stressor housing concerns, financial          SDOH updated and reviewed with the patient during this program:      Financial Resource Strain: High Risk (12/6/2024)    Overall Financial Resource Strain (CARDIA)     Difficulty of Paying Living Expenses: Hard      --     Food Insecurity: No Food Insecurity (12/6/2024)    Hunger Vital Sign     Worried About Running Out of Food in the Last Year: Never true     Ran Out of Food in the Last Year: Never true      --     Housing Stability: High Risk (12/6/2024)    Housing Stability Vital Sign     Unable to Pay for Housing in the Last Year: Yes     Number of Times Moved in the Last Year: 1     Homeless in the Last Year: No      --     Transportation Needs: No Transportation Needs (12/6/2024)    PRAPARE - Transportation     Lack of Transportation (Medical): No     Lack of Transportation (Non-Medical): No      --     Utilities: Not At Risk (12/6/2024)    [facility] Utilities      Threatened with loss of utilities: No         Patient Outreach    MSW received referral from providers office for assistance with community resources. MSW outreach to patient by phone and left voicemail and call back number. MSW will continue to attempt outreach to patient.    Patient Outreach    MSW outreach to patient after received incoming voicemail from patient regarding previous call from MSW earlier today. Patient and MSW discussed the eviction notice that was received by his current landlord in November. Patient states that his landlord notified him that he has sold the building and patient discussed that he and his wife decided to stop paying rent. Patient states he has a court date on January 9 and MSW encouraged patient to attend this court date as they may be able to provide funds to assist with moving costs. Patient denies having financial issues and states that he will do what he needs to avoid homelessness.  Patient stated that he attempted to call some agencies earlier today. MSW asked patient about which agencies he inquired with patient became frustrated with MSW. MSW explained to patient that is helpful for me to understand where he has already contacted to provide additional agency information on where he can reach out to for additional help.  Patient continued to discuss his frustration with housing resources and is frustrated that agencies do not answer the phone and that he must leave a voicemail. Patient states he is just going in circles.  MSW expressed empathy for patient and his situation. Patient then attempted to begin a discussion about why people are homeless and MSW attempted to redirect patient multiple times to community resource needs. Patient continued to become frustrated with MSW when trying to  redirect to community resource information. MSW offered outreach to patient's wife but patient stated that he was done with the conversation and ended the call with MSW. MSW to sign  brina JAMES -   Ambulatory Case Management    12/6/2024, 15:24 EST

## 2024-12-10 RX ORDER — FUROSEMIDE 40 MG/1
40 TABLET ORAL DAILY
Qty: 30 TABLET | Refills: 1 | Status: SHIPPED | OUTPATIENT
Start: 2024-12-10 | End: 2025-02-08

## 2024-12-11 ENCOUNTER — LAB (OUTPATIENT)
Dept: LAB | Facility: HOSPITAL | Age: 54
End: 2024-12-11

## 2024-12-11 DIAGNOSIS — I42.0 NONISCHEMIC DILATED CARDIOMYOPATHY: ICD-10-CM

## 2024-12-11 DIAGNOSIS — I50.22 CHRONIC HFREF (HEART FAILURE WITH REDUCED EJECTION FRACTION): ICD-10-CM

## 2024-12-11 LAB
ANION GAP SERPL CALCULATED.3IONS-SCNC: 8 MMOL/L (ref 5–15)
BUN SERPL-MCNC: 17 MG/DL (ref 6–20)
BUN/CREAT SERPL: 14.2 (ref 7–25)
CALCIUM SPEC-SCNC: 9 MG/DL (ref 8.6–10.5)
CHLORIDE SERPL-SCNC: 107 MMOL/L (ref 98–107)
CO2 SERPL-SCNC: 27 MMOL/L (ref 22–29)
CREAT SERPL-MCNC: 1.2 MG/DL (ref 0.76–1.27)
EGFRCR SERPLBLD CKD-EPI 2021: 71.9 ML/MIN/1.73
GLUCOSE SERPL-MCNC: 104 MG/DL (ref 65–99)
POTASSIUM SERPL-SCNC: 3.8 MMOL/L (ref 3.5–5.2)
SODIUM SERPL-SCNC: 142 MMOL/L (ref 136–145)

## 2024-12-11 PROCEDURE — 36415 COLL VENOUS BLD VENIPUNCTURE: CPT

## 2024-12-11 PROCEDURE — 80048 BASIC METABOLIC PNL TOTAL CA: CPT

## 2024-12-27 ENCOUNTER — TELEPHONE (OUTPATIENT)
Dept: CARDIOLOGY | Facility: CLINIC | Age: 54
End: 2024-12-27
Payer: MEDICAID

## 2024-12-27 NOTE — TELEPHONE ENCOUNTER
Patient called the clinic stating he could not breathe and that he was sweaty and clammy. States he woke up feeling sweaty and like he couldn't breathe. He has not been weighing himself, he is out of some of his medications--I did not want to delay him further so we did not get into specific medications/etc, he has missed multiple clinic appts. He is not having chest pain, but states his symptoms are severe. He did not sound in distress on the phone, but given his description of his symptoms, I told patient that he should go to the ER/call 911

## 2025-01-03 ENCOUNTER — HOSPITAL ENCOUNTER (EMERGENCY)
Facility: HOSPITAL | Age: 55
Discharge: HOME | End: 2025-01-03
Attending: STUDENT IN AN ORGANIZED HEALTH CARE EDUCATION/TRAINING PROGRAM
Payer: COMMERCIAL

## 2025-01-03 ENCOUNTER — APPOINTMENT (OUTPATIENT)
Dept: GENERAL RADIOLOGY | Facility: HOSPITAL | Age: 55
End: 2025-01-03
Payer: COMMERCIAL

## 2025-01-03 VITALS
WEIGHT: 180 LBS | RESPIRATION RATE: 18 BRPM | OXYGEN SATURATION: 98 % | SYSTOLIC BLOOD PRESSURE: 101 MMHG | TEMPERATURE: 99.2 F | HEIGHT: 69 IN | DIASTOLIC BLOOD PRESSURE: 67 MMHG | HEART RATE: 73 BPM | BODY MASS INDEX: 26.66 KG/M2

## 2025-01-03 DIAGNOSIS — J06.9 VIRAL URI: ICD-10-CM

## 2025-01-03 DIAGNOSIS — J10.1 INFLUENZA A: Primary | ICD-10-CM

## 2025-01-03 LAB
ALBUMIN SERPL-MCNC: 3.7 G/DL (ref 3.5–5.2)
ALBUMIN/GLOB SERPL: 1.1 G/DL
ALP SERPL-CCNC: 79 U/L (ref 39–117)
ALT SERPL W P-5'-P-CCNC: 11 U/L (ref 1–41)
ANION GAP SERPL CALCULATED.3IONS-SCNC: 10.7 MMOL/L (ref 5–15)
AST SERPL-CCNC: 23 U/L (ref 1–40)
B PARAPERT DNA SPEC QL NAA+PROBE: NOT DETECTED
B PERT DNA SPEC QL NAA+PROBE: NOT DETECTED
BASOPHILS # BLD AUTO: 0.04 10*3/MM3 (ref 0–0.2)
BASOPHILS NFR BLD AUTO: 0.7 % (ref 0–1.5)
BILIRUB SERPL-MCNC: 0.5 MG/DL (ref 0–1.2)
BUN SERPL-MCNC: 21 MG/DL (ref 6–20)
BUN/CREAT SERPL: 16.9 (ref 7–25)
C PNEUM DNA NPH QL NAA+NON-PROBE: NOT DETECTED
CALCIUM SPEC-SCNC: 8.3 MG/DL (ref 8.6–10.5)
CHLORIDE SERPL-SCNC: 100 MMOL/L (ref 98–107)
CO2 SERPL-SCNC: 22.3 MMOL/L (ref 22–29)
CREAT SERPL-MCNC: 1.24 MG/DL (ref 0.76–1.27)
DEPRECATED RDW RBC AUTO: 41.3 FL (ref 37–54)
EGFRCR SERPLBLD CKD-EPI 2021: 68.7 ML/MIN/1.73
EOSINOPHIL # BLD AUTO: 0.02 10*3/MM3 (ref 0–0.4)
EOSINOPHIL NFR BLD AUTO: 0.4 % (ref 0.3–6.2)
ERYTHROCYTE [DISTWIDTH] IN BLOOD BY AUTOMATED COUNT: 12.8 % (ref 12.3–15.4)
FLUAV H3 RNA NPH QL NAA+PROBE: DETECTED
FLUBV RNA ISLT QL NAA+PROBE: NOT DETECTED
GEN 5 1HR TROPONIN T REFLEX: 38 NG/L
GLOBULIN UR ELPH-MCNC: 3.4 GM/DL
GLUCOSE SERPL-MCNC: 139 MG/DL (ref 65–99)
HADV DNA SPEC NAA+PROBE: NOT DETECTED
HCOV 229E RNA SPEC QL NAA+PROBE: NOT DETECTED
HCOV HKU1 RNA SPEC QL NAA+PROBE: NOT DETECTED
HCOV NL63 RNA SPEC QL NAA+PROBE: NOT DETECTED
HCOV OC43 RNA SPEC QL NAA+PROBE: DETECTED
HCT VFR BLD AUTO: 42.7 % (ref 37.5–51)
HGB BLD-MCNC: 14.1 G/DL (ref 13–17.7)
HMPV RNA NPH QL NAA+NON-PROBE: NOT DETECTED
HOLD SPECIMEN: NORMAL
HOLD SPECIMEN: NORMAL
HPIV1 RNA ISLT QL NAA+PROBE: NOT DETECTED
HPIV2 RNA SPEC QL NAA+PROBE: NOT DETECTED
HPIV3 RNA NPH QL NAA+PROBE: NOT DETECTED
HPIV4 P GENE NPH QL NAA+PROBE: NOT DETECTED
IMM GRANULOCYTES # BLD AUTO: 0.01 10*3/MM3 (ref 0–0.05)
IMM GRANULOCYTES NFR BLD AUTO: 0.2 % (ref 0–0.5)
LYMPHOCYTES # BLD AUTO: 0.43 10*3/MM3 (ref 0.7–3.1)
LYMPHOCYTES NFR BLD AUTO: 7.8 % (ref 19.6–45.3)
M PNEUMO IGG SER IA-ACNC: NOT DETECTED
MCH RBC QN AUTO: 29.7 PG (ref 26.6–33)
MCHC RBC AUTO-ENTMCNC: 33 G/DL (ref 31.5–35.7)
MCV RBC AUTO: 89.9 FL (ref 79–97)
MONOCYTES # BLD AUTO: 0.71 10*3/MM3 (ref 0.1–0.9)
MONOCYTES NFR BLD AUTO: 12.9 % (ref 5–12)
NEUTROPHILS NFR BLD AUTO: 4.29 10*3/MM3 (ref 1.7–7)
NEUTROPHILS NFR BLD AUTO: 78 % (ref 42.7–76)
NT-PROBNP SERPL-MCNC: 2517 PG/ML (ref 0–900)
PLATELET # BLD AUTO: 136 10*3/MM3 (ref 140–450)
PMV BLD AUTO: 10.3 FL (ref 6–12)
POTASSIUM SERPL-SCNC: 3.5 MMOL/L (ref 3.5–5.2)
PROT SERPL-MCNC: 7.1 G/DL (ref 6–8.5)
RBC # BLD AUTO: 4.75 10*6/MM3 (ref 4.14–5.8)
RHINOVIRUS RNA SPEC NAA+PROBE: NOT DETECTED
RSV RNA NPH QL NAA+NON-PROBE: NOT DETECTED
SARS-COV-2 RNA NPH QL NAA+NON-PROBE: NOT DETECTED
SODIUM SERPL-SCNC: 133 MMOL/L (ref 136–145)
TROPONIN T % DELTA: -7 %
TROPONIN T NUMERIC DELTA: -3 NG/L
TROPONIN T SERPL HS-MCNC: 41 NG/L
WBC NRBC COR # BLD AUTO: 5.5 10*3/MM3 (ref 3.4–10.8)
WHOLE BLOOD HOLD COAG: NORMAL
WHOLE BLOOD HOLD SPECIMEN: NORMAL

## 2025-01-03 PROCEDURE — 85025 COMPLETE CBC W/AUTO DIFF WBC: CPT | Performed by: STUDENT IN AN ORGANIZED HEALTH CARE EDUCATION/TRAINING PROGRAM

## 2025-01-03 PROCEDURE — 93005 ELECTROCARDIOGRAM TRACING: CPT

## 2025-01-03 PROCEDURE — 80053 COMPREHEN METABOLIC PANEL: CPT | Performed by: STUDENT IN AN ORGANIZED HEALTH CARE EDUCATION/TRAINING PROGRAM

## 2025-01-03 PROCEDURE — 84484 ASSAY OF TROPONIN QUANT: CPT | Performed by: STUDENT IN AN ORGANIZED HEALTH CARE EDUCATION/TRAINING PROGRAM

## 2025-01-03 PROCEDURE — 71045 X-RAY EXAM CHEST 1 VIEW: CPT

## 2025-01-03 PROCEDURE — 0202U NFCT DS 22 TRGT SARS-COV-2: CPT | Performed by: STUDENT IN AN ORGANIZED HEALTH CARE EDUCATION/TRAINING PROGRAM

## 2025-01-03 PROCEDURE — 93005 ELECTROCARDIOGRAM TRACING: CPT | Performed by: STUDENT IN AN ORGANIZED HEALTH CARE EDUCATION/TRAINING PROGRAM

## 2025-01-03 PROCEDURE — 99284 EMERGENCY DEPT VISIT MOD MDM: CPT

## 2025-01-03 PROCEDURE — 36415 COLL VENOUS BLD VENIPUNCTURE: CPT

## 2025-01-03 PROCEDURE — 83880 ASSAY OF NATRIURETIC PEPTIDE: CPT | Performed by: STUDENT IN AN ORGANIZED HEALTH CARE EDUCATION/TRAINING PROGRAM

## 2025-01-03 RX ORDER — SODIUM CHLORIDE 0.9 % (FLUSH) 0.9 %
10 SYRINGE (ML) INJECTION AS NEEDED
Status: DISCONTINUED | OUTPATIENT
Start: 2025-01-03 | End: 2025-01-04 | Stop reason: HOSPADM

## 2025-01-04 LAB
QT INTERVAL: 377 MS
QTC INTERVAL: 459 MS

## 2025-01-04 NOTE — ED PROVIDER NOTES
EMERGENCY DEPARTMENT ENCOUNTER  Room Number:  27/27  PCP: Provider, No Known  Independent Historians: Patient and Family      HPI:  Chief Complaint: had concerns including Shortness of Breath, Cough, and Fever.     Context: Colten Hager is a 54 y.o. male with a medical history of HTN, GERD, CHF who presents to the ED c/o acute shortness of breath, cough, fatigue, fever.  Patient additionally endorses myalgias.  Patient states he was recently around someone who had the flu.  Symptoms been occurring for 3 days.  Patient is on Lasix at home and has been taking it.      Review of prior external notes (non-ED) -and- Review of prior external test results outside of this encounter: Office visit with cardiology from 10/2/2024 reviewed and notable for visit secondary to NICM/HFrEF.  Plan was to start Farxiga and to start spironolactone.  Plan to follow-up closely.    Prescription drug monitoring program review:         PAST MEDICAL HISTORY  Active Ambulatory Problems     Diagnosis Date Noted    Essential hypertension 11/15/2020    Substance abuse 11/15/2020    Dyslipidemia 11/15/2020    GERD without esophagitis 11/15/2020    Methamphetamine abuse 11/15/2020    IV drug abuse 11/15/2020    Tobacco abuse 11/15/2020    Chronic HFrEF (heart failure with reduced ejection fraction) 11/15/2020    Acute deep vein thrombosis (DVT) of calf muscle vein of left lower extremity 11/17/2020    Nonischemic dilated cardiomyopathy 11/17/2020    History of DVT of lower extremity 08/30/2021    Acute appendicitis with localized peritonitis, without perforation or abscess 08/23/2024    CHF (congestive heart failure) 09/29/2024     Resolved Ambulatory Problems     Diagnosis Date Noted    Systolic CHF, acute 11/15/2020    Acute appendicitis 08/23/2024     Past Medical History:   Diagnosis Date    Cellulitis     GERD (gastroesophageal reflux disease)     Hyperlipidemia     Hypertension          PAST SURGICAL HISTORY  Past Surgical History:    Procedure Laterality Date    CARDIAC CATHETERIZATION N/A 11/16/2020    Procedure: Left Heart Cath;  Surgeon: Fletcher Mcmanus MD;  Location: [facility] CATH INVASIVE LOCATION;  Service: Cardiovascular;  Laterality: N/A;    CARDIAC CATHETERIZATION N/A 11/16/2020    Procedure: Coronary angiography;  Surgeon: Fletcher Mcmanus MD;  Location: [facility] CATH INVASIVE LOCATION;  Service: Cardiovascular;  Laterality: N/A;    CARDIAC CATHETERIZATION N/A 11/16/2020    Procedure: Left ventriculography;  Surgeon: Fletcher Mcmanus MD;  Location: [facility] CATH INVASIVE LOCATION;  Service: Cardiovascular;  Laterality: N/A;         FAMILY HISTORY  History reviewed. No pertinent family history.      SOCIAL HISTORY  Social History     Socioeconomic History    Marital status:    Tobacco Use    Smoking status: Every Day     Current packs/day: 1.00     Types: Cigarettes    Smokeless tobacco: Never   Vaping Use    Vaping status: Never Used   Substance and Sexual Activity    Alcohol use: No    Drug use: Yes     Types: Methamphetamines     Comment: last iv meth use 9/29/24    Sexual activity: Defer         ALLERGIES  Patient has no known allergies.      REVIEW OF SYSTEMS  Review of Systems  Included in HPI  All systems reviewed and negative except for those discussed in HPI.      PHYSICAL EXAM    I have reviewed the triage vital signs and nursing notes.    ED Triage Vitals   Temp Heart Rate Resp BP SpO2   01/03/25 2010 01/03/25 2010 01/03/25 2010 01/03/25 2044 01/03/25 2010   99.2 °F (37.3 °C) 99 18 108/73 100 %      Temp src Heart Rate Source Patient Position BP Location FiO2 (%)   01/03/25 2010 01/03/25 2010 -- -- --   Tympanic Monitor          Physical Exam  GENERAL: alert, no acute distress, ill-appearing  SKIN: Warm, dry  HENT: Normocephalic, atraumatic  EYES: no scleral icterus  CV: regular rhythm, regular rate  RESPIRATORY: normal effort, lungs clear  ABDOMEN: soft, nontender, nondistended  MUSCULOSKELETAL: no  deformity  NEURO: alert, moves all extremities, follows commands            LAB RESULTS  Recent Results (from the past 24 hours)   ECG 12 Lead ED Triage Standing Order; SOA    Collection Time: 01/03/25  8:15 PM   Result Value Ref Range    QT Interval 377 ms    QTC Interval 459 ms   Respiratory Panel PCR w/COVID-19(SARS-CoV-2) DULCE MARIA/PATRIC/JACQUIE/PAD/COR/SHARIF In-House, NP Swab in UTM/VTM, 2 HR TAT - Swab, Nasopharynx    Collection Time: 01/03/25  8:18 PM    Specimen: Nasopharynx; Swab   Result Value Ref Range    ADENOVIRUS, PCR Not Detected Not Detected    Coronavirus 229E Not Detected Not Detected    Coronavirus HKU1 Not Detected Not Detected    Coronavirus NL63 Not Detected Not Detected    Coronavirus OC43 Detected (A) Not Detected    COVID19 Not Detected Not Detected - Ref. Range    Human Metapneumovirus Not Detected Not Detected    Human Rhinovirus/Enterovirus Not Detected Not Detected    Influenza A H3 Detected (A) Not Detected    Influenza B PCR Not Detected Not Detected    Parainfluenza Virus 1 Not Detected Not Detected    Parainfluenza Virus 2 Not Detected Not Detected    Parainfluenza Virus 3 Not Detected Not Detected    Parainfluenza Virus 4 Not Detected Not Detected    RSV, PCR Not Detected Not Detected    Bordetella pertussis pcr Not Detected Not Detected    Bordetella parapertussis PCR Not Detected Not Detected    Chlamydophila pneumoniae PCR Not Detected Not Detected    Mycoplasma pneumo by PCR Not Detected Not Detected   Comprehensive Metabolic Panel    Collection Time: 01/03/25  8:21 PM    Specimen: Blood   Result Value Ref Range    Glucose 139 (H) 65 - 99 mg/dL    BUN 21 (H) 6 - 20 mg/dL    Creatinine 1.24 0.76 - 1.27 mg/dL    Sodium 133 (L) 136 - 145 mmol/L    Potassium 3.5 3.5 - 5.2 mmol/L    Chloride 100 98 - 107 mmol/L    CO2 22.3 22.0 - 29.0 mmol/L    Calcium 8.3 (L) 8.6 - 10.5 mg/dL    Total Protein 7.1 6.0 - 8.5 g/dL    Albumin 3.7 3.5 - 5.2 g/dL    ALT (SGPT) 11 1 - 41 U/L    AST (SGOT) 23 1 - 40 U/L     Alkaline Phosphatase 79 39 - 117 U/L    Total Bilirubin 0.5 0.0 - 1.2 mg/dL    Globulin 3.4 gm/dL    A/G Ratio 1.1 g/dL    BUN/Creatinine Ratio 16.9 7.0 - 25.0    Anion Gap 10.7 5.0 - 15.0 mmol/L    eGFR 68.7 >60.0 mL/min/1.73   BNP    Collection Time: 01/03/25  8:21 PM    Specimen: Blood   Result Value Ref Range    proBNP 2,517.0 (H) 0.0 - 900.0 pg/mL   High Sensitivity Troponin T    Collection Time: 01/03/25  8:21 PM    Specimen: Blood   Result Value Ref Range    HS Troponin T 41 (H) <22 ng/L   Green Top (Gel)    Collection Time: 01/03/25  8:21 PM   Result Value Ref Range    Extra Tube Hold for add-ons.    Lavender Top    Collection Time: 01/03/25  8:21 PM   Result Value Ref Range    Extra Tube hold for add-on    Gold Top - SST    Collection Time: 01/03/25  8:21 PM   Result Value Ref Range    Extra Tube Hold for add-ons.    Light Blue Top    Collection Time: 01/03/25  8:21 PM   Result Value Ref Range    Extra Tube Hold for add-ons.    CBC Auto Differential    Collection Time: 01/03/25  8:21 PM    Specimen: Blood   Result Value Ref Range    WBC 5.50 3.40 - 10.80 10*3/mm3    RBC 4.75 4.14 - 5.80 10*6/mm3    Hemoglobin 14.1 13.0 - 17.7 g/dL    Hematocrit 42.7 37.5 - 51.0 %    MCV 89.9 79.0 - 97.0 fL    MCH 29.7 26.6 - 33.0 pg    MCHC 33.0 31.5 - 35.7 g/dL    RDW 12.8 12.3 - 15.4 %    RDW-SD 41.3 37.0 - 54.0 fl    MPV 10.3 6.0 - 12.0 fL    Platelets 136 (L) 140 - 450 10*3/mm3    Neutrophil % 78.0 (H) 42.7 - 76.0 %    Lymphocyte % 7.8 (L) 19.6 - 45.3 %    Monocyte % 12.9 (H) 5.0 - 12.0 %    Eosinophil % 0.4 0.3 - 6.2 %    Basophil % 0.7 0.0 - 1.5 %    Immature Grans % 0.2 0.0 - 0.5 %    Neutrophils, Absolute 4.29 1.70 - 7.00 10*3/mm3    Lymphocytes, Absolute 0.43 (L) 0.70 - 3.10 10*3/mm3    Monocytes, Absolute 0.71 0.10 - 0.90 10*3/mm3    Eosinophils, Absolute 0.02 0.00 - 0.40 10*3/mm3    Basophils, Absolute 0.04 0.00 - 0.20 10*3/mm3    Immature Grans, Absolute 0.01 0.00 - 0.05 10*3/mm3   High Sensitivity Troponin T  1Hr    Collection Time: 01/03/25  9:40 PM    Specimen: Blood   Result Value Ref Range    HS Troponin T 38 (H) <22 ng/L    Troponin T Numeric Delta -3 ng/L    Troponin T % Delta -7 Abnormal if >/= 20% %         RADIOLOGY  XR Chest 1 View    Result Date: 1/3/2025  XR CHEST 1 VW-1/3/2025  HISTORY: Shortness of breath.  Heart size is mildly enlarged. Lungs appear clear. There is a small amount of aortic calcification. Bony structures appear unremarkable.      1. Mild cardiomegaly.   This report was finalized on 1/3/2025 8:37 PM by Dr. Louis Yanez M.D on Workstation: [id number]         MEDICATIONS GIVEN IN ER  Medications   sodium chloride 0.9 % flush 10 mL (has no administration in time range)         ORDERS PLACED DURING THIS VISIT:  Orders Placed This Encounter   Procedures    Respiratory Panel PCR w/COVID-19(SARS-CoV-2) DULCE MARIA/PATRIC/JACQUIE/PAD/COR/SHARIF In-House, NP Swab in UTM/VTM, 2 HR TAT - Swab, Nasopharynx    XR Chest 1 View    [place] Draw    Comprehensive Metabolic Panel    BNP    High Sensitivity Troponin T    CBC Auto Differential    High Sensitivity Troponin T 1Hr    NPO Diet NPO Type: Strict NPO    Undress & Gown    Continuous Pulse Oximetry    Vital Signs    Oxygen Therapy- Nasal Cannula; Titrate 1-6 LPM Per SpO2; 90 - 95%    ECG 12 Lead ED Triage Standing Order; SOA    Telemetry Scan    Insert Peripheral IV    CBC & Differential    Green Top (Gel)    Lavender Top    Gold Top - SST    Light Blue Top         OUTPATIENT MEDICATION MANAGEMENT:  Current Facility-Administered Medications Ordered in Epic   Medication Dose Route Frequency Provider Last Rate Last Admin    sodium chloride 0.9 % flush 10 mL  10 mL Intravenous PRN Sean Henderson MD         Current Outpatient Medications Ordered in Epic   Medication Sig Dispense Refill    carvedilol (COREG) 3.125 MG tablet Take 1 tablet by mouth Every 12 (Twelve) Hours. 60 tablet 0    furosemide (Lasix) 40 MG tablet Take 1 tablet by mouth Daily for 60 days. 30  tablet 1    losartan (COZAAR) 25 MG tablet Take 1 tablet by mouth Daily. NEEDS AN APPOINTMENT FOR FUTURE REFILLS 30 tablet 0    spironolactone (ALDACTONE) 25 MG tablet Take 1 tablet by mouth Daily. 30 tablet 0    dapagliflozin Propanediol (Farxiga) 10 MG tablet Take 10 mg by mouth Daily. 30 tablet 0         PROCEDURES  Procedures            PROGRESS, DATA ANALYSIS, CONSULTS, AND MEDICAL DECISION MAKING  All labs have been independently interpreted by me.  All radiology studies have been reviewed by me. All EKG's have been independently viewed and interpreted by me.  Discussion below represents my analysis of pertinent findings related to patient's condition, differential diagnosis, treatment plan and final disposition.    Differential diagnosis includes but is not limited to flu, COVID, viral URI, CHF exacerbation, pneumonia.    Clinical Scores:                                       ED Course as of 01/03/25 2314   Fri Jan 03, 2025   2151 EKG interpreted by me demonstrates sinus rhythm, rate 89, no IA/QT prolongation, no ST elevation [MW]   2151 Chest x-ray interpreted by me demonstrates no evidence of consolidation or pulmonary edema [MW]   2152 Laboratory evaluation is notable for mild elevation of BNP, RPP that is positive for coronavirus and influenza.  Troponin is slightly elevated at 41 which is consistent with patient's baseline. [MW]   2312 Offered Tamiflu and patient declined.  Patient's troponin did decline.  Patient is euvolemic on exam outside of a mild elevation of BNP.  Will discharge patient and have him follow-up closely with primary care and cardiology.  Patient agreeable to this plan.  Remaining vital signs within normal limits.  Strict return precautions given and patient discharged in stable condition. [MW]      ED Course User Index  [MW] Sean Henderson MD             AS OF 23:14 EST VITALS:    BP - 101/67  HR - 73  TEMP - 99.2 °F (37.3 °C) (Tympanic)  O2 SATS - 98%    COMPLEXITY OF  CARE  Admission was considered but after careful review of the patient's presentation, physical examination, diagnostic results, and response to treatment the patient may be safely discharged with outpatient follow-up.      DIAGNOSIS  Final diagnoses:   Influenza A   Viral URI         DISPOSITION  ED Disposition       ED Disposition   Discharge    Condition   Stable    Comment   --                Please note that portions of this document were completed with a voice recognition program.    Note Disclaimer: At [facility], we believe that sharing information builds trust and better relationships. You are receiving this note because you recently visited [facility]. It is possible you will see health information before a provider has talked with you about it. This kind of information can be easy to misunderstand. To help you fully understand what it means for your health, we urge you to discuss this note with your provider.         Sean Henderson MD  01/03/25 5023

## 2025-01-04 NOTE — DISCHARGE INSTRUCTIONS
Follow-up primary care physician within 3 to 5 days for repeat evaluation    Please follow-up with your cardiologist next week for repeat assessment    Please return to the ER with new or worsening symptoms including but not limited to worsening shortness of breath, chest pain, lightheadedness, fevers, chills, inability tolerate oral intake

## 2025-01-13 ENCOUNTER — HOSPITAL ENCOUNTER (OUTPATIENT)
Dept: CARDIOLOGY | Facility: HOSPITAL | Age: 55
Discharge: HOME | End: 2025-01-13
Admitting: NURSE PRACTITIONER
Payer: COMMERCIAL

## 2025-01-13 VITALS
WEIGHT: 182.8 LBS | OXYGEN SATURATION: 97 % | HEIGHT: 69 IN | DIASTOLIC BLOOD PRESSURE: 92 MMHG | SYSTOLIC BLOOD PRESSURE: 131 MMHG | BODY MASS INDEX: 27.08 KG/M2 | HEART RATE: 92 BPM

## 2025-01-13 DIAGNOSIS — L03.317 CELLULITIS OF BUTTOCK, RIGHT: ICD-10-CM

## 2025-01-13 DIAGNOSIS — I42.0 NONISCHEMIC DILATED CARDIOMYOPATHY: ICD-10-CM

## 2025-01-13 DIAGNOSIS — I50.22 CHRONIC HFREF (HEART FAILURE WITH REDUCED EJECTION FRACTION): Primary | ICD-10-CM

## 2025-01-13 PROCEDURE — 94726 PLETHYSMOGRAPHY LUNG VOLUMES: CPT | Performed by: NURSE PRACTITIONER

## 2025-01-13 PROCEDURE — G0463 HOSPITAL OUTPT CLINIC VISIT: HCPCS

## 2025-01-13 RX ORDER — SULFAMETHOXAZOLE AND TRIMETHOPRIM 800; 160 MG/1; MG/1
1 TABLET ORAL 2 TIMES DAILY
Qty: 14 TABLET | Refills: 0 | Status: SHIPPED | OUTPATIENT
Start: 2025-01-13 | End: 2025-01-20

## 2025-01-13 RX ORDER — SPIRONOLACTONE 25 MG/1
25 TABLET ORAL DAILY
Qty: 30 TABLET | Refills: 3 | Status: SHIPPED | OUTPATIENT
Start: 2025-01-13

## 2025-01-13 RX ORDER — DAPAGLIFLOZIN 10 MG/1
10 TABLET, FILM COATED ORAL DAILY
Qty: 30 TABLET | Refills: 3 | Status: SHIPPED | OUTPATIENT
Start: 2025-01-13

## 2025-01-13 RX ORDER — FUROSEMIDE 40 MG/1
40 TABLET ORAL DAILY
Qty: 30 TABLET | Refills: 3 | Status: SHIPPED | OUTPATIENT
Start: 2025-01-13

## 2025-01-13 RX ORDER — CARVEDILOL 3.12 MG/1
3.12 TABLET ORAL EVERY 12 HOURS SCHEDULED
Qty: 60 TABLET | Refills: 3 | Status: SHIPPED | OUTPATIENT
Start: 2025-01-13

## 2025-01-13 RX ORDER — LOSARTAN POTASSIUM 25 MG/1
25 TABLET ORAL DAILY
Qty: 30 TABLET | Refills: 3 | Status: SHIPPED | OUTPATIENT
Start: 2025-01-13

## 2025-01-13 NOTE — PATIENT INSTRUCTIONS
Please f/u in 2 weeks.     Please follow-up with the advanced heart failure team   Also the cardiologist doctor to discuss ICD.     Directions for when to call the clinic reviewed with the patient to include weight gain of 2 to 3 pounds in 24 hours, weight gain of 5 to 10 pounds within 7 days; worsening shortness of breath; worsening lower extremity edema or abdominal distention.

## 2025-01-13 NOTE — PROGRESS NOTES
[facility]    Michael Ward MD  [address]  [address]  [place] [address]    Thank you for asking me to see Colten Hager.     Congestive Heart Failure  Associated symptoms include shortness of breath.       1. NICM/HFrEF    Subjective   Mr. Colten Hager is a 53 year old male that presents to the Heart Failure clinic for initial evaluation of chronic NICM/HFrEF.   He has a history of nonischemic cardiomyopathy, hypertension, DVT and methamphetamine use.   He is followed by [facility] Dr Ward.   His cardiomyopathy has been felt to be due to chornic/contiued methamphetamine use. He has previously been admives to stop drug use and start on medical therapy.   Unfortunately,  he continued meth use.  Hospitalization 8/23/2024-8/25/2024 for acute appendicitis, he had 2D TTE during this hospitalization which showed-There is severe global hypokinesis. LVEF 21 - 25%.  Left ventricular diastolic function is consistent with (grade II w/high LAP) pseudonormalization.  Normal right ventricular cavity size and systolic function noted. The left atrial cavity is moderately dilated.  Mild to moderate mitral valve regurgitation is present. Mild tricuspid valve regurgitation is present. RVSP 33 mmHg.    ADHF hospitalization 9/29/2024. He received IV diuretics, and his prior regimen of 80 mg oral Lasix daily along with his prior ARB and carvedilol were continued at discharge. His aldactone was held at discharge until follow-up  He states he does want to stop meth use and has not used since most recent hospitalization 9/29. He presents to clinic with his wife who states they are currently under a great deal of financial strain as she is the only income for them and they are at risk of getting evicted from their current home.   Additionally he is very concerned because he does not have any insurance. They are working to get medicaid    Today Mr Hager presents to the clinic with concerns of  "needing refills. He has missed several appointments recently complicating optimizing his GDMT.  He admits to going back to using methamphetamines, he admits to IV drug use. He has had recent housing issues, we did get him in contact with . He is currently staying in a hotel  He denies any new or worsening shortness of breath. Reports he does have chronic unchanges exertional dyspnea and exercise intolerance  Patient had ER visit 1/3/2025 diagnosed with coronavirus and influenza--he reports still having a cough.   He is concerned about a \"boil\" on his right buttock, states the area is tender. States the \"boil\" popped and there was drainage. No longer having drainage, but still red and tender        Review of Systems - Review of Systems   Constitutional: Negative for weight gain and weight loss.   Cardiovascular:  Positive for dyspnea on exertion. Negative for leg swelling.   Respiratory:  Positive for shortness of breath.    Skin:         Right buttock boil          Patient Active Problem List   Diagnosis    Essential hypertension    Substance abuse    Dyslipidemia    GERD without esophagitis    Methamphetamine abuse    IV drug abuse    Tobacco abuse    Chronic HFrEF (heart failure with reduced ejection fraction)    Acute deep vein thrombosis (DVT) of calf muscle vein of left lower extremity    Nonischemic dilated cardiomyopathy    History of DVT of lower extremity    Acute appendicitis with localized peritonitis, without perforation or abscess    CHF (congestive heart failure)     family history is not on file.   reports that he has been smoking cigarettes. He has never used smokeless tobacco. He reports current drug use. Drug: Methamphetamines. He reports that he does not drink alcohol.  No Known Allergies  Physical Activity: Not on file          Current Outpatient Medications:     carvedilol (COREG) 3.125 MG tablet, Take 1 tablet by mouth Every 12 (Twelve) Hours., Disp: 60 tablet, Rfl: 3    Farxiga " 10 MG tablet, Take 10 mg by mouth Daily., Disp: 30 tablet, Rfl: 3    furosemide (Lasix) 40 MG tablet, Take 1 tablet by mouth Daily., Disp: 30 tablet, Rfl: 3    losartan (COZAAR) 25 MG tablet, Take 1 tablet by mouth Daily. NEEDS AN APPOINTMENT FOR FUTURE REFILLS, Disp: 30 tablet, Rfl: 3    spironolactone (ALDACTONE) 25 MG tablet, Take 1 tablet by mouth Daily., Disp: 30 tablet, Rfl: 3    sulfamethoxazole-trimethoprim (Bactrim DS) 800-160 MG per tablet, Take 1 tablet by mouth 2 (Two) Times a Day for 7 days., Disp: 14 tablet, Rfl: 0    Objective   Vital Sign Review:   Vitals:    01/13/25 1515   BP: 131/92   Pulse: 92   SpO2: 97%         Body mass index is 26.98 kg/m².      01/13/25  1515   Weight: 82.9 kg (182 lb 12.8 oz)       Physical Exam:  Constitutional:       Appearance: Normal and healthy appearance.   HENT:      Head: Normocephalic.    Mouth/Throat:      Lips: Pink.   Neck:      Vascular: No carotid bruit or JVD. JVD normal.   Pulmonary:      Effort: Pulmonary effort is normal.      Breath sounds: Normal breath sounds.   Cardiovascular:      PMI at left midclavicular line. Normal rate. Regular rhythm.      Murmurs: There is a systolic murmur.   Pulses:     Radial: 2+ bilaterally.  Edema:     Peripheral edema absent.   Abdominal:      Palpations: Abdomen is soft.      Tenderness: There is no abdominal tenderness.   Skin:     General: Skin is warm and dry.      Capillary Refill: Capillary refill takes less than 2 seconds.        Neurological:      General: No focal deficit present.      Mental Status: Alert and oriented to person, place and time.   Psychiatric:         Behavior: Behavior is cooperative.          DATA REVIEWED:   EKG:      ---------------------------------------------------  ECHO:    Results for orders placed during the hospital encounter of 08/23/24    Adult Transthoracic Echo Complete W/ Cont if Necessary Per Protocol    Interpretation Summary    The left ventricular cavity is severely dilated.     There is severe global hypokinesis. Best antwon segment appears to be the septum    Left ventricular systolic function is severely decreased. Left ventricular ejection fraction appears to be 21 - 25%.    Left ventricular diastolic function is consistent with (grade II w/high LAP) pseudonormalization.    Normal right ventricular cavity size and systolic function noted.    The left atrial cavity is moderately dilated.    Mild to moderate mitral valve regurgitation is present.    Mild tricuspid valve regurgitation is present    Calculated right ventricular systolic pressure from tricuspid regurgitation is 33 mmHg.    There is no evidence of pericardial effusion          -----------------------------------------------------  RHC/LHC    Results for orders placed during the hospital encounter of 11/15/20    Cardiac Catheterization/Vascular Study    Conclusion  · Severe systolic dysfunction.  · 24/ 1.  Dilated cardiomyopathy:  Etiology: Uncertain  Anatomy: Normal coronary arteries, severely dilated left ventricle  Physiology: Poor global left ventricular function, mitral regurgitation, congestive heart failure  Functional status: Severely compromised  Prognosis: Guarded      ---------------------------------------------------------------------------    CT:   XR Chest 1 View    Result Date: 1/3/2025  1. Mild cardiomegaly.   This report was finalized on 1/3/2025 8:37 PM by Dr. Louis Yanez M.D on Workstation: [id number]           --------------------------------------------------------------------------------------------------------------    Laboratory evaluations:  Renal Function: Estimated Creatinine Clearance: 78.9 mL/min (by C-G formula based on SCr of 1.24 mg/dL).    Lab Results   Component Value Date    GLUCOSE 139 (H) 01/03/2025    BUN 21 (H) 01/03/2025    CREATININE 1.24 01/03/2025    EGFRIFNONA 74 09/01/2021    EGFRIFAFRI >60 11/14/2020    BCR 16.9 01/03/2025    K 3.5 01/03/2025    CO2 22.3  "01/03/2025    CALCIUM 8.3 (L) 01/03/2025    ALBUMIN 3.7 01/03/2025    LABIL2 1.1 11/14/2020    AST 23 01/03/2025    ALT 11 01/03/2025     Lab Results   Component Value Date    WBC 5.50 01/03/2025    HGB 14.1 01/03/2025    HCT 42.7 01/03/2025    MCV 89.9 01/03/2025     (L) 01/03/2025     No results found for: \"HGBA1C\"  No results found for: \"HGBA1C\"  Lab Results   Component Value Date    CREATININE 1.24 01/03/2025     No results found for: \"IRON\", \"TIBC\", \"FERRITIN\"    Result Review:  I have personally reviewed the results from the time of this admission to 1/14/2025 08:26 EST and agree with these findings:  [x]  Laboratory list / accordion  []  Microbiology  [x]  Radiology  [x]  EKG/Telemetry   [x]  Cardiology/Vascular   []  Pathology  [x]  Old records  []  Other:  Most notable findings include:   Note last admission  \"Mr. Hager is a 53 y.o. gentleman with past medical history notable for nonischemic cardiomyopathy, hypertension, history of DVT, tobacco abuse, and ongoing methamphetamine use who presents to the emergency room with worsening shortness of breath and dyspnea on exertion.  He has been without Lasix for a number of weeks.  He reports that he did have access to his other medications possibly through our office may be through another nurse practitioner he had seen although somewhat questionable.  Nonetheless he has been restarted on his prior regimen.  Previously this is done a reasonable job with controlling his symptoms.  The main issue though is his ongoing methamphetamine use.  He admitted to using methamphetamine on 9/28.  Our emergency room did not get a drug screen not sure why but nonetheless he does admit to using methamphetamines likely the reason for his ongoing and persistent cardiomyopathy and does limit options to better treat him most notably further consideration for primary prevention ICD which would not be safe to implant in somebody with ongoing methamphetamine use.  I think if he " "were to get off of drugs his heart would likely recover this is usually the case not impossible that he has not another reason for his cardiomyopathy or an intrinsic nonischemic cardiomyopathy but again optimization of his medical regimen has been limited due to patient compliance follow-up and again ongoing drug use.  Hopefully we can get him off of drugs but he is really been hesitant to do so we have talked to him for the last 4 years about getting off drugs he is started to experience the complications of this with loss of dentition he appears frail and weak and now has no insurance due to not being able to keep a job all of which is related to his ongoing drug use unfortunately.  From our standpoint he is doing better would restart his prior regimen of 80 mg oral Lasix daily along with his prior ARB and carvedilol.  Previously on Aldactone would probably hold this until he is seen in follow-up.  Probably would not be unreasonable to have him referred to our heart failure clinic which does have some good resources as well anyway to get him appropriate follow-up would be great.  He might benefit from further IV diuresis today he was hoping to get out I would say if he walks around and does not have any oxygen needs he will do well with continued diuresis transition to oral diuretics and can follow-up with us as an outpatient. \"       PH Screening:  The patientwas screened today for PH.  The patient's last 2D TTE showed the patient does not currently have PH.         Sleep Evaluation:  We will need to discuss sleep evaluation at future visit.         6 MINUTE WALK   He refused 6min walk                    Cardiac Amyloid Screening Questions   Cardiac Amyloid Screening Questions     Does the patient experience or have a diagnosis of any of the following:    HF: yes  Aortic stenosis: no  Afib: no  Heart block: no  Peripheral neuropathy: no  Orthostatic hypotension: no  Renal dysfunction: no  Carpal tunnel syndrome: " no  Lumbar spinal stenosis: yes  Proteinuria: yes  Unexpected/unintentional weight loss: no  Frequent nausea/early satiety: no  Significant episodes of diarrhea/constipation: no        -ICD/CRT-D:   Current ICD/CRT-D indications:   *ICM with LVEF less than or equal to 35% due to prior myocardial infarction who are at least 40 days post-myocardial infarction and are in NYHA functional Class II or III.   *ICM due to prior myocardial infarction who are at least 40 days post-myocardial infarction, have an LVEF less than or equal to 30%, and are in NYHA functional Class I.   *NICM with LVEF less than or equal to 35% and who are in NYHA functional Class II or III. These patients should be on GDMT for at least 90 days with another LV EF assessment showing LV EF<35%.      [email or web address]-content/[email or web address]    ------------------------------------------------------------------  Today, the patient was assessed for ICD/CRT-D therapy. ICD would be indicated as he has had continued depressed EF, however the patient does have continued methamphetamine use and there is concern about safety of ICD implantation in current/active drug user. However, given he is a candidate, I will refer to EP for further discussion/evaluation of ICD         AHF:   Around 10% of HF patients will need AHF. ALL patients should be screened at EACH VISIT for this indication. The INEEDHELP mnemonic is commonly utilized to determine if the patient needs an assessment for AHF therapies including inotropes at home, consideration for OHT/VAD.    Referral to HF Specialist: Uses the acronym I-NEED-HELP.  I: Intravenous inotropes  N: New York Heart Association (NYHA) class IIIB/IV or persistently elevated natriuretic peptides  E: End-organ dysfunction  E: EF <=35%  D: Defibrillator shocks  H: Hospitalizations >1  E: Edema despite escalating diuretics  L: Low systolic BP <=90, high heart rate  P: Prognostic  medication; progressive intolerance or down-titration of guideline-directed medical therapy [GDMT])    -The patient was screened today for AHF and does meet indications as he has had continued reduced EF.           ReDS VOLUMETRIC ASSESSMENT:  ReDs Vest    Performed by: Lizeth Mazariegos APRN  Authorized by: Lizeth Mazariegos APRN    ReDS value:  45  ReDS Value Description:  42-50 (red) = Hypervolemic Status        Assessment & Plan      1. Chronic HFrEF (heart failure with reduced ejection fraction)    2. Nonischemic dilated cardiomyopathy    3. Cellulitis of buttock, right        1-2 NICM/HFrEF--EF 21-25%. Pt appears euvolemic.   Optimizing GDMT has been difficult as patient has not been compliant with his appointments. I had lengthy discussion with patient about keeping appt and medications current  He had recent labs reviewed from 1/3/2025 BNP 2517, creatinine 1.24, GFR 68, sodium 133, potassium 3.5  His bp is not at goal, but he has been out of some medications and he is not taking carvedilol BID.   Strongly re-enforced importance of medication compliance and follow-up to optimize GDMT.   Because he has been out of meds and previously BP has been marginal we will continue previous GDMT  Refills sent to pharmacy.   We will continue spironolactone at 25mg daily for now since he has been out and look to increase spironolactone to 50mg daily ---LVIDd 7.2cm at f/u visit.  If at f/u BP remains elevated I would like to try changing losartan to entresto--although this was previously held due to marginal BP.   Although there has been concern about ICD being placed due to being an IV drug user. He has not had consult with EP, I will refer to EP--he has had continued reduced EF <35%  Also discussed with the patient his advanced heart failure. Strongly encouraged stopping meth abuse. Offered assistance with rehab programs, he was not interested at this time.   At this point given continued reduced EF, I will also  refer to AHF at [facility]  He does not have a PCP--referral also sent for PCP  GDMT listed below    3. Right buttock cellulitis--area of induration, but no drainable abscess or fluctuance. Given his hx of IV drug abuse will cover for MRSA and start bactrim             NYHA stage C FC-III-IV     Clinical status was assessed and has worsened.  Treatment intent: curative   ReDS reading was obtained today.  ReDS result: 45       Today, Patient appears euvolemic. and with perfusion. The patient's hemodynamics are currently acceptable. HR is: normal and is at goal. BP/MAP was reviewed and there is room for medication up-titration.  The patient's clinical course has been impacted by drug use. LVEF: 21-25%.     GDMT Assessment: The patient is currently on quadruple therapy (with addition of medications today).      GDMT changes recommended today:continue/restart all medications    BB:   continue Carvedilol  3.125mg bid  Monitor heart rate.  Please call the [facility] for HR<50, dizziness, lightheadedness, syncope    A/A/A:     continue  Losartan  25mg daily  Occasional monitoring of Chem-7 is recommended; call for the development of a new cough or S/Sx angioedema    SGLT2-I:  continue Dapagliflozin (Farxiga) 10mg daily  Call for S/Sx genital mycotic infections  Do not take when inadequate oral intake, NPO, GI illness    MRA:  The patient is FC-NYHA Class III and MRA is indicated.   continue Spironolactone to  25mg daily   BMP at next visit in 2 weeks.       -DIURETIC:   furosemide (LASIX) 40 mg every day  -Fluid restriction:   -requested  -2000 ml  -Patient has been asked to weigh daily and was provided with a printed diuretic strategy.  -Sodium restriction:   -1,500 mg Na restriction was discussed.      Labs/Diagnostics/Referrals:    Labs -Chem-7-in 2 weeks   Referrals AHF-[facility], EP, Primary care       Lifestyle Advice:   - call office if I gain more than 2 pounds in one day or 5 pounds in one week   - keep legs up while sitting   -  use salt in moderation   - watch for swelling in feet, ankles and legs every day   - weigh myself daily   -call for concerning s/sx   -- activity or exercise based on tolerance encouraged     CODE STATUS: FULL              Return in about 2 weeks (around 1/27/2025).                Thank you for allowing me to participate in the care of your patient,    Time Spent: I spent 48 minutes caring for Mr. Colten Hager  on this date of service. This time includes time spent by me in the following activities: preparing for the visit, reviewing tests, performing a medically appropriate examination and/or evaluations, counseling and educating the patient/family/caregiver, ordering medications, tests, or procedures, documenting information in the medical record, and independently interpreting results and communicating that information with the patient/family/caregiver.       Lizeth Mazariegos, APRN    01/14/25  14:42 EDT      **Felipe Disclaimer:**  Much of this encounter note is an electronic transcription/translation of spoken language to printed text. The electronic translation of spoken language may permit erroneous, or at times, nonsensical words or phrases to be inadvertently transcribed. Although I have reviewed the note for such errors, some may still exist.    The information in this note was reviewed and updated during the visit to be as accurate as possible. As many patients have chronic medical problems, many of their individual problems and ongoing plans do not change significantly from visit to visit.  This information is correct and is consistent with my treatment plan as of today's visit.

## 2025-01-14 ENCOUNTER — TELEPHONE (OUTPATIENT)
Dept: CARDIOLOGY | Facility: HOSPITAL | Age: 55
End: 2025-01-14
Payer: COMMERCIAL

## 2025-01-14 NOTE — TELEPHONE ENCOUNTER
DIANA Machado is referring patient to Dr Lin at [facility].   Faxed patient's information to [phone] to get scheduled with Dr Lin.    Thanks,  Gracie SÁNCHEZ [facility]

## 2025-01-15 RX ORDER — FUROSEMIDE 40 MG/1
40 TABLET ORAL DAILY
Qty: 90 TABLET | OUTPATIENT
Start: 2025-01-15

## 2025-01-29 ENCOUNTER — HOSPITAL ENCOUNTER (OUTPATIENT)
Dept: CARDIOLOGY | Facility: HOSPITAL | Age: 55
Discharge: HOME | End: 2025-01-29
Admitting: NURSE PRACTITIONER
Payer: COMMERCIAL

## 2025-01-29 VITALS
HEART RATE: 87 BPM | WEIGHT: 181.6 LBS | BODY MASS INDEX: 26.9 KG/M2 | HEIGHT: 69 IN | OXYGEN SATURATION: 98 % | DIASTOLIC BLOOD PRESSURE: 91 MMHG | SYSTOLIC BLOOD PRESSURE: 133 MMHG

## 2025-01-29 DIAGNOSIS — I42.0 NONISCHEMIC DILATED CARDIOMYOPATHY: ICD-10-CM

## 2025-01-29 DIAGNOSIS — F15.10 METHAMPHETAMINE ABUSE: ICD-10-CM

## 2025-01-29 DIAGNOSIS — I50.22 CHRONIC HFREF (HEART FAILURE WITH REDUCED EJECTION FRACTION): Primary | ICD-10-CM

## 2025-01-29 PROCEDURE — G0463 HOSPITAL OUTPT CLINIC VISIT: HCPCS

## 2025-01-29 PROCEDURE — 94726 PLETHYSMOGRAPHY LUNG VOLUMES: CPT | Performed by: NURSE PRACTITIONER

## 2025-01-29 RX ORDER — SACUBITRIL AND VALSARTAN 24; 26 MG/1; MG/1
1 TABLET, FILM COATED ORAL 2 TIMES DAILY
Qty: 60 TABLET | Refills: 1 | Status: SHIPPED | OUTPATIENT
Start: 2025-01-29

## 2025-01-29 RX ORDER — SULFAMETHOXAZOLE AND TRIMETHOPRIM 800; 160 MG/1; MG/1
1 TABLET ORAL
COMMUNITY

## 2025-01-29 NOTE — PROGRESS NOTES
[facility]    Michael Ward MD  [address]  [address]  [place] [address]    Thank you for asking me to see Colten Hager.     Congestive Heart Failure  Associated symptoms include shortness of breath.       1. NICM/HFrEF    Subjective   Mr. Colten Hager is a 53 year old male that presents to the Heart Failure clinic for initial evaluation of chronic NICM/HFrEF.   He has a history of nonischemic cardiomyopathy, hypertension, DVT and methamphetamine use.   He is followed by [facility] Dr Ward.   His cardiomyopathy has been felt to be due to chornic/contiued methamphetamine use. He has previously been admives to stop drug use and start on medical therapy.   Unfortunately,  he continued meth use.  Hospitalization 8/23/2024-8/25/2024 for acute appendicitis, he had 2D TTE during this hospitalization which showed-There is severe global hypokinesis. LVEF 21 - 25%.  Left ventricular diastolic function is consistent with (grade II w/high LAP) pseudonormalization.  Normal right ventricular cavity size and systolic function noted. The left atrial cavity is moderately dilated.  Mild to moderate mitral valve regurgitation is present. Mild tricuspid valve regurgitation is present. RVSP 33 mmHg.    ADHF hospitalization 9/29/2024. He received IV diuretics, and his prior regimen of 80 mg oral Lasix daily along with his prior ARB and carvedilol were continued at discharge. His aldactone was held at discharge until follow-up  He states he does want to stop meth use and has not used since most recent hospitalization 9/29. He presents to clinic with his wife who states they are currently under a great deal of financial strain as she is the only income for them and they are at risk of getting evicted from their current home.   Additionally he is very concerned because he does not have any insurance. They are working to get medicaid    Today Mr Hager presents  for follow-up--he states he has  been taking all of his medications since last visit. He has been referred to [facility]. He last used meth 4 days ago and is feeling like he is withdrawing. He is still currently staying in a hotel  Reports chronic and unchanged exertional dyspnea and exercise intolerance. Reports general fatigue.   At last visit he was treated for cellulitis of his right buttock-patient states that is better now.     Review of Systems - Review of Systems   Constitutional: Positive for malaise/fatigue. Negative for weight gain and weight loss.   Cardiovascular:  Positive for dyspnea on exertion. Negative for leg swelling.   Respiratory:  Positive for shortness of breath.           Patient Active Problem List   Diagnosis    Essential hypertension    Substance abuse    Dyslipidemia    GERD without esophagitis    Methamphetamine abuse    IV drug abuse    Tobacco abuse    Chronic HFrEF (heart failure with reduced ejection fraction)    Acute deep vein thrombosis (DVT) of calf muscle vein of left lower extremity    Nonischemic dilated cardiomyopathy    History of DVT of lower extremity    Acute appendicitis with localized peritonitis, without perforation or abscess    CHF (congestive heart failure)     family history is not on file.   reports that he has been smoking cigarettes. He has never used smokeless tobacco. He reports current drug use. Drug: Methamphetamines. He reports that he does not drink alcohol.  No Known Allergies  Physical Activity: Not on file          Current Outpatient Medications:     carvedilol (COREG) 3.125 MG tablet, Take 1 tablet by mouth Every 12 (Twelve) Hours., Disp: 60 tablet, Rfl: 3    Farxiga 10 MG tablet, Take 10 mg by mouth Daily., Disp: 30 tablet, Rfl: 3    furosemide (Lasix) 40 MG tablet, Take 1 tablet by mouth Daily., Disp: 30 tablet, Rfl: 3    spironolactone (ALDACTONE) 25 MG tablet, Take 1 tablet by mouth Daily., Disp: 30 tablet, Rfl: 3    sulfamethoxazole-trimethoprim (BACTRIM DS,SEPTRA DS) 800-160 MG per  tablet, Take 1 tablet by mouth., Disp: , Rfl:     sacubitril-valsartan (Entresto) 24-26 MG tablet, Take 1 tablet by mouth 2 (Two) Times a Day., Disp: 60 tablet, Rfl: 1    Objective   Vital Sign Review:   Vitals:    01/29/25 1509   BP: 133/91   Pulse: 87   SpO2: 98%           Body mass index is 26.81 kg/m².      01/29/25  1509   Weight: 82.4 kg (181 lb 9.6 oz)         Physical Exam:  Constitutional:       Appearance: Normal and healthy appearance.   HENT:      Head: Normocephalic.    Mouth/Throat:      Lips: Pink.   Neck:      Vascular: No carotid bruit or JVD. JVD normal.   Pulmonary:      Effort: Pulmonary effort is normal.      Breath sounds: Normal breath sounds.   Cardiovascular:      PMI at left midclavicular line. Normal rate. Regular rhythm.      Murmurs: There is a systolic murmur.   Pulses:     Radial: 2+ bilaterally.  Edema:     Peripheral edema absent.   Abdominal:      Palpations: Abdomen is soft.      Tenderness: There is no abdominal tenderness.   Skin:     General: Skin is warm and dry.      Capillary Refill: Capillary refill takes less than 2 seconds.   Neurological:      General: No focal deficit present.      Mental Status: Alert and oriented to person, place and time.   Psychiatric:         Behavior: Behavior is cooperative.          DATA REVIEWED:   EKG:      ---------------------------------------------------  ECHO:    Results for orders placed during the hospital encounter of 08/23/24    Adult Transthoracic Echo Complete W/ Cont if Necessary Per Protocol    Interpretation Summary    The left ventricular cavity is severely dilated.    There is severe global hypokinesis. Best antwon segment appears to be the septum    Left ventricular systolic function is severely decreased. Left ventricular ejection fraction appears to be 21 - 25%.    Left ventricular diastolic function is consistent with (grade II w/high LAP) pseudonormalization.    Normal right ventricular cavity size and systolic  "function noted.    The left atrial cavity is moderately dilated.    Mild to moderate mitral valve regurgitation is present.    Mild tricuspid valve regurgitation is present    Calculated right ventricular systolic pressure from tricuspid regurgitation is 33 mmHg.    There is no evidence of pericardial effusion          -----------------------------------------------------  RHC/LHC    Results for orders placed during the hospital encounter of 11/15/20    Cardiac Catheterization/Vascular Study    Conclusion  · Severe systolic dysfunction.  · 24/ 1.  Dilated cardiomyopathy:  Etiology: Uncertain  Anatomy: Normal coronary arteries, severely dilated left ventricle  Physiology: Poor global left ventricular function, mitral regurgitation, congestive heart failure  Functional status: Severely compromised  Prognosis: Guarded      ---------------------------------------------------------------------------    CT:   XR Chest 1 View    Result Date: 1/3/2025  1. Mild cardiomegaly.   This report was finalized on 1/3/2025 8:37 PM by Dr. Louis Yanez M.D on Workstation: [id number]           --------------------------------------------------------------------------------------------------------------    Laboratory evaluations:  Renal Function: Estimated Creatinine Clearance: 78.4 mL/min (by C-G formula based on SCr of 1.24 mg/dL).    Lab Results   Component Value Date    GLUCOSE 139 (H) 01/03/2025    BUN 21 (H) 01/03/2025    CREATININE 1.24 01/03/2025    EGFRIFNONA 74 09/01/2021    EGFRIFAFRI >60 11/14/2020    BCR 16.9 01/03/2025    K 3.5 01/03/2025    CO2 22.3 01/03/2025    CALCIUM 8.3 (L) 01/03/2025    ALBUMIN 3.7 01/03/2025    LABIL2 1.1 11/14/2020    AST 23 01/03/2025    ALT 11 01/03/2025     Lab Results   Component Value Date    WBC 5.50 01/03/2025    HGB 14.1 01/03/2025    HCT 42.7 01/03/2025    MCV 89.9 01/03/2025     (L) 01/03/2025     No results found for: \"HGBA1C\"  No results found for: \"HGBA1C\"  Lab " "Results   Component Value Date    CREATININE 1.24 01/03/2025     No results found for: \"IRON\", \"TIBC\", \"FERRITIN\"    Result Review:  I have personally reviewed the results from the time of this admission to 1/29/2025 15:41 EST and agree with these findings:  [x]  Laboratory list / accordion  []  Microbiology  [x]  Radiology  [x]  EKG/Telemetry   [x]  Cardiology/Vascular   []  Pathology  [x]  Old records  []  Other:  Most notable findings include:   Note last admission  \"Mr. Hager is a 53 y.o. gentleman with past medical history notable for nonischemic cardiomyopathy, hypertension, history of DVT, tobacco abuse, and ongoing methamphetamine use who presents to the emergency room with worsening shortness of breath and dyspnea on exertion.  He has been without Lasix for a number of weeks.  He reports that he did have access to his other medications possibly through our office may be through another nurse practitioner he had seen although somewhat questionable.  Nonetheless he has been restarted on his prior regimen.  Previously this is done a reasonable job with controlling his symptoms.  The main issue though is his ongoing methamphetamine use.  He admitted to using methamphetamine on 9/28.  Our emergency room did not get a drug screen not sure why but nonetheless he does admit to using methamphetamines likely the reason for his ongoing and persistent cardiomyopathy and does limit options to better treat him most notably further consideration for primary prevention ICD which would not be safe to implant in somebody with ongoing methamphetamine use.  I think if he were to get off of drugs his heart would likely recover this is usually the case not impossible that he has not another reason for his cardiomyopathy or an intrinsic nonischemic cardiomyopathy but again optimization of his medical regimen has been limited due to patient compliance follow-up and again ongoing drug use.  Hopefully we can get him off of drugs but " "he is really been hesitant to do so we have talked to him for the last 4 years about getting off drugs he is started to experience the complications of this with loss of dentition he appears frail and weak and now has no insurance due to not being able to keep a job all of which is related to his ongoing drug use unfortunately.  From our standpoint he is doing better would restart his prior regimen of 80 mg oral Lasix daily along with his prior ARB and carvedilol.  Previously on Aldactone would probably hold this until he is seen in follow-up.  Probably would not be unreasonable to have him referred to our heart failure clinic which does have some good resources as well anyway to get him appropriate follow-up would be great.  He might benefit from further IV diuresis today he was hoping to get out I would say if he walks around and does not have any oxygen needs he will do well with continued diuresis transition to oral diuretics and can follow-up with us as an outpatient. \"       PH Screening:  The patientwas screened today for PH.  The patient's last 2D TTE showed the patient does not currently have PH.         Sleep Evaluation:  We will need to discuss sleep evaluation at future visit.         6 MINUTE WALK   He refused 6min walk                    Cardiac Amyloid Screening Questions   Cardiac Amyloid Screening Questions     Does the patient experience or have a diagnosis of any of the following:    HF: yes  Aortic stenosis: no  Afib: no  Heart block: no  Peripheral neuropathy: no  Orthostatic hypotension: no  Renal dysfunction: no  Carpal tunnel syndrome: no  Lumbar spinal stenosis: yes  Proteinuria: yes  Unexpected/unintentional weight loss: no  Frequent nausea/early satiety: no  Significant episodes of diarrhea/constipation: no        -ICD/CRT-D:   Current ICD/CRT-D indications:   *ICM with LVEF less than or equal to 35% due to prior myocardial infarction who are at least 40 days post-myocardial infarction and " are in NYHA functional Class II or III.   *ICM due to prior myocardial infarction who are at least 40 days post-myocardial infarction, have an LVEF less than or equal to 30%, and are in NYHA functional Class I.   *NICM with LVEF less than or equal to 35% and who are in NYHA functional Class II or III. These patients should be on GDMT for at least 90 days with another LV EF assessment showing LV EF<35%.      [email or web address]-content/[email or web address]    ------------------------------------------------------------------  Today, the patient was assessed for ICD/CRT-D therapy. ICD would be indicated as he has had continued depressed EF, however the patient does have continued methamphetamine use and there is concern about safety of ICD implantation in current/active drug user. However, given he is a candidate, I will refer to EP for further discussion/evaluation of ICD         AHF:   Around 10% of HF patients will need AHF. ALL patients should be screened at EACH VISIT for this indication. The INEEDHELP mnemonic is commonly utilized to determine if the patient needs an assessment for AHF therapies including inotropes at home, consideration for OHT/VAD.    Referral to HF Specialist: Uses the acronym I-NEED-HELP.  I: Intravenous inotropes  N: New York Heart Association (NYHA) class IIIB/IV or persistently elevated natriuretic peptides  E: End-organ dysfunction  E: EF <=35%  D: Defibrillator shocks  H: Hospitalizations >1  E: Edema despite escalating diuretics  L: Low systolic BP <=90, high heart rate  P: Prognostic medication; progressive intolerance or down-titration of guideline-directed medical therapy [GDMT])    -The patient was screened today for AHF and does meet indications as he has had continued reduced EF.           ReDS VOLUMETRIC ASSESSMENT:  ReDs Vest    Performed by: Lizeth Mazariegos APRN  Authorized by: Lizeth Mazariegos APRN    ReDS value:  37  ReDS  Value Description:  36-41 (orange) = Possible Hypervolemic Status        Assessment & Plan      1. Chronic HFrEF (heart failure with reduced ejection fraction)    2. Nonischemic dilated cardiomyopathy    3. Methamphetamine abuse        1-2 NICM/HFrEF--EF 21-25%. Pt appears euvolemic.   Optimizing GDMT has been difficult as patient has not been compliant with his appointments.   He had recent labs reviewed from 1/3/2025 BNP 2517, creatinine 1.24, GFR 68, sodium 133, potassium 3.5  His bp is not at goal, I will change his losartan to Entresto  Strongly re-enforced importance of medication compliance and follow-up to optimize GDMT.   Samples of Farxiga given today  Although there has been concern about ICD being placed due to being an IV drug user. He has not had consult with EP, I will refer to EP--he has had continued reduced EF <35%  Also discussed with the patient his advanced heart failure. Strongly encouraged stopping meth abuse. Offered assistance with rehab programs, he was not interested at this time.   A he has been referred [facility] AHF at [facility]  He does not have a PCP--referral also sent for PCP  GDMT listed below    3.  Methamphetamine abuse-he last used 4 days ago.  He is going through withdrawal.  Discussed if he started to feel worse he may need to go to the ER. He does not want rehab at this time.      NYHA stage C FC-III-IV     Clinical status was assessed and has worsened.  Treatment intent: curative   ReDS reading was obtained today.  ReDS result: 37       Today, Patient appears euvolemic. and with perfusion. The patient's hemodynamics are currently acceptable. HR is: normal and is at goal. BP/MAP was reviewed and there is room for medication up-titration.  The patient's clinical course has been impacted by drug use. LVEF: 21-25%.     GDMT Assessment: The patient is currently on quadruple therapy (with addition of medications today).      GDMT changes recommended today:continue/restart all  medications    BB:   continue Carvedilol  3.125mg bid  Reported side effects with metoprolol  Monitor heart rate.  Please call the [facility] for HR<50, dizziness, lightheadedness, syncope    A/A/A:     Start entresto 24-26mg BID  (stopped losartan)  Occasional monitoring of Chem-7 is recommended; call for the development of a new cough or S/Sx angioedema    SGLT2-I:  continue Dapagliflozin (Farxiga) 10mg daily  Call for S/Sx genital mycotic infections  Do not take when inadequate oral intake, NPO, GI illness    MRA:  The patient is FC-NYHA Class III and MRA is indicated.   continue Spironolactone to  25mg daily   BMP today and recommend quarterly       -DIURETIC:   furosemide (LASIX) 40 mg every day  -Fluid restriction:   -requested  -2000 ml  -Patient has been asked to weigh daily and was provided with a printed diuretic strategy.  -Sodium restriction:   -1,500 mg Na restriction was discussed.      Labs/Diagnostics/Referrals:    Labs -Chem-7   Referrals F-[facility], EP, Primary care       Lifestyle Advice:   - call office if I gain more than 2 pounds in one day or 5 pounds in one week   - keep legs up while sitting   - use salt in moderation   - watch for swelling in feet, ankles and legs every day   - weigh myself daily   -call for concerning s/sx   -- activity or exercise based on tolerance encouraged     CODE STATUS: FULL              Return in about 2 weeks (around 2/12/2025).                Thank you for allowing me to participate in the care of your patient,    Time Spent: I spent 48 minutes caring for Mr. Colten Hager  on this date of service. This time includes time spent by me in the following activities: preparing for the visit, reviewing tests, performing a medically appropriate examination and/or evaluations, counseling and educating the patient/family/caregiver, ordering medications, tests, or procedures, documenting information in the medical record, and independently interpreting results and communicating  that information with the patient/family/caregiver.       Lizeth Mazariegos, APRN    01/29/25  14:42 EDT      **Felipe Disclaimer:**  Much of this encounter note is an electronic transcription/translation of spoken language to printed text. The electronic translation of spoken language may permit erroneous, or at times, nonsensical words or phrases to be inadvertently transcribed. Although I have reviewed the note for such errors, some may still exist.    The information in this note was reviewed and updated during the visit to be as accurate as possible. As many patients have chronic medical problems, many of their individual problems and ongoing plans do not change significantly from visit to visit.  This information is correct and is consistent with my treatment plan as of today's visit.

## 2025-02-11 ENCOUNTER — TELEPHONE (OUTPATIENT)
Dept: INTERNAL MEDICINE | Facility: CLINIC | Age: 55
End: 2025-02-11
Payer: COMMERCIAL

## 2025-02-11 ENCOUNTER — TELEPHONE (OUTPATIENT)
Dept: CARDIOLOGY | Facility: HOSPITAL | Age: 55
End: 2025-02-11
Payer: COMMERCIAL

## 2025-02-11 NOTE — TELEPHONE ENCOUNTER
Spoke to patient's wife. She was confirming which medication was supposed to be stopped and switched to Entresto. Discussed he should stop losartan. Copay card was provided to patient and they should not have any issues at the pharmacy with copay.    Jason IslasD, BCACP  [facility]  Phone: [phone]

## 2025-02-11 NOTE — TELEPHONE ENCOUNTER
----- Message from Gracie SÁNCHEZ sent at 2/11/2025 11:36 AM EST -----  Regarding: Entresto  Patient's wife Lizeth left a VM today. She reports she has questions regarding his Entresto.    Call back # of [phone]    Thanks,  Gracie SÁNCHEZ [facility]

## 2025-02-11 NOTE — TELEPHONE ENCOUNTER
Called to get patient rescheduled for his appt with  today due to weather his voicemail was full     Hub okay to relay and reschedule

## 2025-03-14 ENCOUNTER — LAB (OUTPATIENT)
Dept: LAB | Facility: HOSPITAL | Age: 55
End: 2025-03-14
Payer: COMMERCIAL

## 2025-03-14 ENCOUNTER — HOSPITAL ENCOUNTER (OUTPATIENT)
Dept: CARDIOLOGY | Facility: HOSPITAL | Age: 55
Discharge: HOME | End: 2025-03-14
Payer: COMMERCIAL

## 2025-03-14 VITALS
WEIGHT: 184.6 LBS | OXYGEN SATURATION: 98 % | HEART RATE: 94 BPM | BODY MASS INDEX: 27.34 KG/M2 | DIASTOLIC BLOOD PRESSURE: 105 MMHG | SYSTOLIC BLOOD PRESSURE: 147 MMHG | HEIGHT: 69 IN

## 2025-03-14 DIAGNOSIS — I42.0 NONISCHEMIC DILATED CARDIOMYOPATHY: Primary | ICD-10-CM

## 2025-03-14 DIAGNOSIS — I50.22 CHRONIC HFREF (HEART FAILURE WITH REDUCED EJECTION FRACTION): ICD-10-CM

## 2025-03-14 DIAGNOSIS — F19.10 IV DRUG ABUSE: ICD-10-CM

## 2025-03-14 DIAGNOSIS — F15.10 METHAMPHETAMINE ABUSE: ICD-10-CM

## 2025-03-14 LAB
ANION GAP SERPL CALCULATED.3IONS-SCNC: 12.7 MMOL/L (ref 5–15)
BUN SERPL-MCNC: 20 MG/DL (ref 6–20)
BUN/CREAT SERPL: 15.2 (ref 7–25)
CALCIUM SPEC-SCNC: 9.1 MG/DL (ref 8.6–10.5)
CHLORIDE SERPL-SCNC: 103 MMOL/L (ref 98–107)
CO2 SERPL-SCNC: 27.3 MMOL/L (ref 22–29)
CREAT SERPL-MCNC: 1.32 MG/DL (ref 0.76–1.27)
EGFRCR SERPLBLD CKD-EPI 2021: 63.7 ML/MIN/1.73
GLUCOSE SERPL-MCNC: 90 MG/DL (ref 65–99)
POTASSIUM SERPL-SCNC: 4 MMOL/L (ref 3.5–5.2)
SODIUM SERPL-SCNC: 143 MMOL/L (ref 136–145)

## 2025-03-14 PROCEDURE — G0463 HOSPITAL OUTPT CLINIC VISIT: HCPCS

## 2025-03-14 PROCEDURE — 94726 PLETHYSMOGRAPHY LUNG VOLUMES: CPT | Performed by: NURSE PRACTITIONER

## 2025-03-14 PROCEDURE — 36415 COLL VENOUS BLD VENIPUNCTURE: CPT

## 2025-03-14 PROCEDURE — 80048 BASIC METABOLIC PNL TOTAL CA: CPT

## 2025-03-14 RX ORDER — CARVEDILOL 3.12 MG/1
6.25 TABLET ORAL EVERY 12 HOURS SCHEDULED
Qty: 120 TABLET | Refills: 0 | Status: SHIPPED | OUTPATIENT
Start: 2025-03-14

## 2025-03-14 NOTE — PROGRESS NOTES
[facility]    Michael Ward MD  [address]  [address]  [place] [address]    Thank you for asking me to see Colten Hager.     Congestive Heart Failure  Associated symptoms include shortness of breath.       1. NICM/HFrEF    Subjective   Mr. Colten Hager is a 53 year old male that presents to the Heart Failure clinic for initial evaluation of chronic NICM/HFrEF.   He has a history of nonischemic cardiomyopathy, hypertension, DVT and methamphetamine use.   He is followed by [facility] Dr Ward.   His cardiomyopathy has been felt to be due to chornic/contiued methamphetamine use. He has previously been admives to stop drug use and start on medical therapy.   Unfortunately,  he continued meth use.  Hospitalization 8/23/2024-8/25/2024 for acute appendicitis, he had 2D TTE during this hospitalization which showed-There is severe global hypokinesis. LVEF 21 - 25%.  Left ventricular diastolic function is consistent with (grade II w/high LAP) pseudonormalization.  Normal right ventricular cavity size and systolic function noted. The left atrial cavity is moderately dilated.  Mild to moderate mitral valve regurgitation is present. Mild tricuspid valve regurgitation is present. RVSP 33 mmHg.    ADHF hospitalization 9/29/2024. He received IV diuretics, and his prior regimen of 80 mg oral Lasix daily along with his prior ARB and carvedilol were continued at discharge. His aldactone was held at discharge until follow-up  He states he does want to stop meth use and has not used since most recent hospitalization 9/29. He presents to clinic with his wife who states they are currently under a great deal of financial strain as she is the only income for them and they are at risk of getting evicted from their current home.   Additionally he is very concerned because he does not have any insurance. They are working to get medicaid    Today Mr Hager presents  for follow-up--he states he has  been taking all of his medications.  However he states he is only taking medications once a day, he does not think he is taking the carvedilol or Entresto twice a day.  He reports stable weights.  He has declined EP referral and [facility] AHF referral.  He was supposed to follow-up with primary care, however the appt was canceled and he did not reschedule.   Unfortunately his still using methamphetamines.  Last use today.  He does use IV methamphetamines      Review of Systems - Review of Systems   Constitutional: Positive for malaise/fatigue. Negative for weight gain and weight loss.   Cardiovascular:  Positive for dyspnea on exertion. Negative for leg swelling.   Respiratory:  Positive for shortness of breath.           Patient Active Problem List   Diagnosis    Essential hypertension    Substance abuse    Dyslipidemia    GERD without esophagitis    Methamphetamine abuse    IV drug abuse    Tobacco abuse    Chronic HFrEF (heart failure with reduced ejection fraction)    Acute deep vein thrombosis (DVT) of calf muscle vein of left lower extremity    Nonischemic dilated cardiomyopathy    History of DVT of lower extremity    Acute appendicitis with localized peritonitis, without perforation or abscess    CHF (congestive heart failure)     family history is not on file.   reports that he has been smoking cigarettes. He has never used smokeless tobacco. He reports current drug use. Drug: Methamphetamines. He reports that he does not drink alcohol.  No Known Allergies  Physical Activity: Not on file          Current Outpatient Medications:     carvedilol (COREG) 3.125 MG tablet, Take 2 tablets by mouth Every 12 (Twelve) Hours., Disp: 120 tablet, Rfl: 0    Farxiga 10 MG tablet, Take 10 mg by mouth Daily., Disp: 30 tablet, Rfl: 3    furosemide (Lasix) 40 MG tablet, Take 1 tablet by mouth Daily., Disp: 30 tablet, Rfl: 3    sacubitril-valsartan (Entresto) 24-26 MG tablet, Take 1 tablet by mouth 2 (Two) Times a Day., Disp: 60  tablet, Rfl: 1    spironolactone (ALDACTONE) 25 MG tablet, Take 1 tablet by mouth Daily., Disp: 30 tablet, Rfl: 3    Objective   Vital Sign Review:   Vitals:    03/14/25 1504   BP: (!) 147/105   Pulse: 94   SpO2: 98%             Body mass index is 27.25 kg/m².      03/14/25  1504   Weight: 83.7 kg (184 lb 9.6 oz)           Physical Exam:  Constitutional:       Appearance: Normal and healthy appearance.   HENT:      Head: Normocephalic.    Mouth/Throat:      Lips: Pink.   Neck:      Vascular: No carotid bruit or JVD. JVD normal.   Pulmonary:      Effort: Pulmonary effort is normal.      Breath sounds: Normal breath sounds.   Cardiovascular:      PMI at left midclavicular line. Normal rate. Regular rhythm.      Murmurs: There is a systolic murmur.   Pulses:     Radial: 2+ bilaterally.  Edema:     Peripheral edema absent.   Abdominal:      Palpations: Abdomen is soft.      Tenderness: There is no abdominal tenderness.   Skin:     General: Skin is warm and dry.      Capillary Refill: Capillary refill takes less than 2 seconds.   Neurological:      General: No focal deficit present.      Mental Status: Alert and oriented to person, place and time.   Psychiatric:         Behavior: Behavior is cooperative.          DATA REVIEWED:   EKG:      ---------------------------------------------------  ECHO:    Results for orders placed during the hospital encounter of 08/23/24    Adult Transthoracic Echo Complete W/ Cont if Necessary Per Protocol    Interpretation Summary    The left ventricular cavity is severely dilated.    There is severe global hypokinesis. Best antwon segment appears to be the septum    Left ventricular systolic function is severely decreased. Left ventricular ejection fraction appears to be 21 - 25%.    Left ventricular diastolic function is consistent with (grade II w/high LAP) pseudonormalization.    Normal right ventricular cavity size and systolic function noted.    The left atrial cavity is  "moderately dilated.    Mild to moderate mitral valve regurgitation is present.    Mild tricuspid valve regurgitation is present    Calculated right ventricular systolic pressure from tricuspid regurgitation is 33 mmHg.    There is no evidence of pericardial effusion          -----------------------------------------------------  RHC/LHC    Results for orders placed during the hospital encounter of 11/15/20    Cardiac Catheterization/Vascular Study    Conclusion  · Severe systolic dysfunction.  · 24/ 1.  Dilated cardiomyopathy:  Etiology: Uncertain  Anatomy: Normal coronary arteries, severely dilated left ventricle  Physiology: Poor global left ventricular function, mitral regurgitation, congestive heart failure  Functional status: Severely compromised  Prognosis: Guarded      ---------------------------------------------------------------------------    CT:   No radiology results for the last 30 days.        --------------------------------------------------------------------------------------------------------------    Laboratory evaluations:  Renal Function: CrCl cannot be calculated (Patient's most recent lab result is older than the maximum 30 days allowed.).    Lab Results   Component Value Date    GLUCOSE 139 (H) 01/03/2025    BUN 21 (H) 01/03/2025    CREATININE 1.24 01/03/2025    EGFRIFNONA 74 09/01/2021    EGFRIFAFRI >60 11/14/2020    BCR 16.9 01/03/2025    K 3.5 01/03/2025    CO2 22.3 01/03/2025    CALCIUM 8.3 (L) 01/03/2025    ALBUMIN 3.7 01/03/2025    LABIL2 1.1 11/14/2020    AST 23 01/03/2025    ALT 11 01/03/2025     Lab Results   Component Value Date    WBC 5.50 01/03/2025    HGB 14.1 01/03/2025    HCT 42.7 01/03/2025    MCV 89.9 01/03/2025     (L) 01/03/2025     No results found for: \"HGBA1C\"  No results found for: \"HGBA1C\"  Lab Results   Component Value Date    CREATININE 1.24 01/03/2025     No results found for: \"IRON\", \"TIBC\", \"FERRITIN\"    Result Review:  I have personally reviewed the " "results from the time of this admission to 3/14/2025 15:39 EDT and agree with these findings:  [x]  Laboratory list / accordion  []  Microbiology  [x]  Radiology  [x]  EKG/Telemetry   [x]  Cardiology/Vascular   []  Pathology  [x]  Old records  []  Other:  Most notable findings include:   Note last admission  \"Mr. Hager is a 53 y.o. gentleman with past medical history notable for nonischemic cardiomyopathy, hypertension, history of DVT, tobacco abuse, and ongoing methamphetamine use who presents to the emergency room with worsening shortness of breath and dyspnea on exertion.  He has been without Lasix for a number of weeks.  He reports that he did have access to his other medications possibly through our office may be through another nurse practitioner he had seen although somewhat questionable.  Nonetheless he has been restarted on his prior regimen.  Previously this is done a reasonable job with controlling his symptoms.  The main issue though is his ongoing methamphetamine use.  He admitted to using methamphetamine on 9/28.  Our emergency room did not get a drug screen not sure why but nonetheless he does admit to using methamphetamines likely the reason for his ongoing and persistent cardiomyopathy and does limit options to better treat him most notably further consideration for primary prevention ICD which would not be safe to implant in somebody with ongoing methamphetamine use.  I think if he were to get off of drugs his heart would likely recover this is usually the case not impossible that he has not another reason for his cardiomyopathy or an intrinsic nonischemic cardiomyopathy but again optimization of his medical regimen has been limited due to patient compliance follow-up and again ongoing drug use.  Hopefully we can get him off of drugs but he is really been hesitant to do so we have talked to him for the last 4 years about getting off drugs he is started to experience the complications of this with " "loss of dentition he appears frail and weak and now has no insurance due to not being able to keep a job all of which is related to his ongoing drug use unfortunately.  From our standpoint he is doing better would restart his prior regimen of 80 mg oral Lasix daily along with his prior ARB and carvedilol.  Previously on Aldactone would probably hold this until he is seen in follow-up.  Probably would not be unreasonable to have him referred to our heart failure clinic which does have some good resources as well anyway to get him appropriate follow-up would be great.  He might benefit from further IV diuresis today he was hoping to get out I would say if he walks around and does not have any oxygen needs he will do well with continued diuresis transition to oral diuretics and can follow-up with us as an outpatient. \"       PH Screening:  The patientwas screened today for PH.  The patient's last 2D TTE showed the patient does not currently have PH.         Sleep Evaluation:  We will need to discuss sleep evaluation at future visit.         6 MINUTE WALK   He refused 6min walk                    Cardiac Amyloid Screening Questions   Cardiac Amyloid Screening Questions     Does the patient experience or have a diagnosis of any of the following:    HF: yes  Aortic stenosis: no  Afib: no  Heart block: no  Peripheral neuropathy: no  Orthostatic hypotension: no  Renal dysfunction: no  Carpal tunnel syndrome: no  Lumbar spinal stenosis: yes  Proteinuria: yes  Unexpected/unintentional weight loss: no  Frequent nausea/early satiety: no  Significant episodes of diarrhea/constipation: no        -ICD/CRT-D:   Current ICD/CRT-D indications:   *ICM with LVEF less than or equal to 35% due to prior myocardial infarction who are at least 40 days post-myocardial infarction and are in NYHA functional Class II or III.   *ICM due to prior myocardial infarction who are at least 40 days post-myocardial infarction, have an LVEF less than or " equal to 30%, and are in NYHA functional Class I.   *NICM with LVEF less than or equal to 35% and who are in NYHA functional Class II or III. These patients should be on GDMT for at least 90 days with another LV EF assessment showing LV EF<35%.      [email or web address]    ------------------------------------------------------------------  Today, the patient was assessed for ICD/CRT-D therapy. ICD would be indicated as he has had continued depressed EF, however the patient does have continued methamphetamine use and there is concern about safety of ICD implantation in current/active drug user. However, given he is a candidate, I will refer to EP for further discussion/evaluation of ICD         AHF:   Around 10% of HF patients will need AHF. ALL patients should be screened at EACH VISIT for this indication. The INEEDHELP mnemonic is commonly utilized to determine if the patient needs an assessment for AHF therapies including inotropes at home, consideration for OHT/VAD.    Referral to HF Specialist: Uses the acronym I-NEED-HELP.  I: Intravenous inotropes  N: New York Heart Association (NYHA) class IIIB/IV or persistently elevated natriuretic peptides  E: End-organ dysfunction  E: EF <=35%  D: Defibrillator shocks  H: Hospitalizations >1  E: Edema despite escalating diuretics  L: Low systolic BP <=90, high heart rate  P: Prognostic medication; progressive intolerance or down-titration of guideline-directed medical therapy [GDMT])    -The patient was screened today for AHF and does meet indications as he has had continued reduced EF.           ReDS VOLUMETRIC ASSESSMENT:  ReDs Vest    Performed by: Lizeth Mazariegos APRN  Authorized by: Lizeth Mazariegos APRN    ReDS value:  36  ReDS Value Description:  36-41 (orange) = Possible Hypervolemic Status        Assessment & Plan      1. Nonischemic dilated cardiomyopathy    2. Chronic HFrEF (heart  failure with reduced ejection fraction)    3. Methamphetamine abuse    4. IV drug abuse          1-2 NICM/HFrEF--EF 21-25%. Pt appears euvolemic.   Very difficult situation given he is actively still using IV methamphetamines  Optimizing GDMT has been difficult as patient has not been compliant with his appointments.   Strongly re-enforced importance of medication compliance and follow-up with his doctors/specialists and follow-up in clinic to optimize GDMT.   Blood pressure has been elevated and at last visit he was started on Entresto 24-26 twice daily.  He thinks he is only taking it once a day, however we still have room to increase carvedilol.  Carvedilol increased to 6.25 twice daily.  I will continue Entresto at the current dose.  Look to increase to mid dose at next visit if hemodynamics allow.  He also was supposed to have labs drawn after last visit, but he did not go to lab, he states he will go to lab today  Regarding ICD due to continued HFrEF ---he was referred but canceled his appt with Dr Meraz-cards EP  Also discussed with the patient his advanced heart failure--he has been referred to AHF at [facility]--however he declined this as well  He was supposed to be getting established with a primary care provider--this appt was canceled.  GDMT listed below    3-4.  Methamphetamine abuse-he last used today.  His methamphetamine abuse is complicating his care and situation.  He is not interested in recovery/rehab facility at this time.  Offered  referral to discuss options to stop the IV methamphetamine abuse.  He is not interested in stopping the methamphetamine abuse at this time.      NYHA stage C FC-III-IV     Clinical status was assessed and has worsened.  Treatment intent: curative   ReDS reading was obtained today.  ReDS result: 36       Today, Patient appears euvolemic. and with perfusion. The patient's hemodynamics are currently acceptable. HR is: normal and is not at goal. BP/MAP was  reviewed and there is room for medication up-titration.  The patient's clinical course has been impacted by drug use and compliance. LVEF: 21-25%.     GDMT Assessment: The patient is currently on quadruple therapy (with addition of medications today).      GDMT changes recommended today:continue/restart all medications    BB:   continue Carvedilol  6.25mg bid  Reported side effects with metoprolol  Monitor heart rate.  Please call the [facility] for HR<50, dizziness, lightheadedness, syncope    A/A/A:     Continue entresto 24-26mg BID    Occasional monitoring of Chem-7 is recommended; call for the development of a new cough or S/Sx angioedema    SGLT2-I:  continue Dapagliflozin (Farxiga) 10mg daily  Call for S/Sx genital mycotic infections  Do not take when inadequate oral intake, NPO, GI illness    MRA:  The patient is FC-NYHA Class III and MRA is indicated.   continue Spironolactone to  25mg daily   BMP today and recommend quarterly       -DIURETIC:   furosemide (LASIX) 40 mg every day  -Fluid restriction:   -requested  -2000 ml  -Patient has been asked to weigh daily and was provided with a printed diuretic strategy.  -Sodium restriction:   -1,500 mg Na restriction was discussed.      Labs/Diagnostics/Referrals:    Labs -Chem-7       Lifestyle Advice:   - call office if I gain more than 2 pounds in one day or 5 pounds in one week   - keep legs up while sitting   - use salt in moderation   - watch for swelling in feet, ankles and legs every day   - weigh myself daily   -call for concerning s/sx   -- activity or exercise based on tolerance encouraged     CODE STATUS: FULL              Return in about 2 weeks (around 3/28/2025).                Thank you for allowing me to participate in the care of your patient,    Time Spent: I spent 48 minutes caring for Mr. Colten Hager  on this date of service. This time includes time spent by me in the following activities: preparing for the visit, reviewing tests, performing a  medically appropriate examination and/or evaluations, counseling and educating the patient/family/caregiver, ordering medications, tests, or procedures, documenting information in the medical record, and independently interpreting results and communicating that information with the patient/family/caregiver.       Lizeth Mazariegos, APRN    03/14/25  14:42 EDT      **Felipe Disclaimer:**  Much of this encounter note is an electronic transcription/translation of spoken language to printed text. The electronic translation of spoken language may permit erroneous, or at times, nonsensical words or phrases to be inadvertently transcribed. Although I have reviewed the note for such errors, some may still exist.    The information in this note was reviewed and updated during the visit to be as accurate as possible. As many patients have chronic medical problems, many of their individual problems and ongoing plans do not change significantly from visit to visit.  This information is correct and is consistent with my treatment plan as of today's visit.

## 2025-03-14 NOTE — PATIENT INSTRUCTIONS
Directions for when to call the clinic reviewed with the patient to include weight gain of 2 to 3 pounds in 24 hours, weight gain of 5 to 10 pounds within 7 days; worsening shortness of breath; worsening lower extremity edema or abdominal distention.    Increase carvedilol to 6.25mg twice a day.     Please make sure you are taking her medications as they are prescribed.  Specifically Entresto and carvedilol are to be taken twice a day    Labs today and we will call with the results.     Please let us know when you are read to stop using.     You should be following up with the cardiologist.  You should also be following up with your primary care provider  Please schedule these appts

## 2025-03-14 NOTE — LETTER
March 14, 2025     Michael Ward MD  [address] [facility]  [address]  [place] [address]    Patient: Colten Hager   YOB: 1970   Date of Visit: 3/14/2025     Dear Michael Ward MD:       Thank you for referring Colten Hager to me for evaluation. Below are the relevant portions of my assessment and plan of care.    If you have questions, please do not hesitate to call me. I look forward to following Colten along with you.         Sincerely,        DIANA Waldrop        CC: MD Delilah Hernandez, DIANA Mcclure  03/14/25 1546  Signed    [facility]    Michael Ward MD  [address] [place] [address]    Thank you for asking me to see Colten Hager.     Congestive Heart Failure  Associated symptoms include shortness of breath.       1. NICM/HFrEF    Subjective  Mr. Colten Hager is a 53 year old male that presents to the Heart Failure clinic for initial evaluation of chronic NICM/HFrEF.   He has a history of nonischemic cardiomyopathy, hypertension, DVT and methamphetamine use.   He is followed by [facility] Dr Ward.   His cardiomyopathy has been felt to be due to chornic/contiued methamphetamine use. He has previously been admives to stop drug use and start on medical therapy.   Unfortunately,  he continued meth use.  Hospitalization 8/23/2024-8/25/2024 for acute appendicitis, he had 2D TTE during this hospitalization which showed-There is severe global hypokinesis. LVEF 21 - 25%.  Left ventricular diastolic function is consistent with (grade II w/high LAP) pseudonormalization.  Normal right ventricular cavity size and systolic function noted. The left atrial cavity is moderately dilated.  Mild to moderate mitral valve regurgitation is present. Mild tricuspid valve regurgitation is present. RVSP 33 mmHg.    ADHF hospitalization 9/29/2024. He received IV diuretics, and his prior regimen of 80 mg oral Lasix daily along with  his prior ARB and carvedilol were continued at discharge. His aldactone was held at discharge until follow-up  He states he does want to stop meth use and has not used since most recent hospitalization 9/29. He presents to clinic with his wife who states they are currently under a great deal of financial strain as she is the only income for them and they are at risk of getting evicted from their current home.   Additionally he is very concerned because he does not have any insurance. They are working to get medicaid    Today Mr Hager presents  for follow-up--he states he has been taking all of his medications.  However he states he is only taking medications once a day, he does not think he is taking the carvedilol or Entresto twice a day.  He reports stable weights.  He has declined EP referral and [facility] referral.  He was supposed to follow-up with primary care, however the appt was canceled and he did not reschedule.   Unfortunately his still using methamphetamines.  Last use today.  He does use IV methamphetamines      Review of Systems - Review of Systems   Constitutional: Positive for malaise/fatigue. Negative for weight gain and weight loss.   Cardiovascular:  Positive for dyspnea on exertion. Negative for leg swelling.   Respiratory:  Positive for shortness of breath.           Patient Active Problem List   Diagnosis   • Essential hypertension   • Substance abuse   • Dyslipidemia   • GERD without esophagitis   • Methamphetamine abuse   • IV drug abuse   • Tobacco abuse   • Chronic HFrEF (heart failure with reduced ejection fraction)   • Acute deep vein thrombosis (DVT) of calf muscle vein of left lower extremity   • Nonischemic dilated cardiomyopathy   • History of DVT of lower extremity   • Acute appendicitis with localized peritonitis, without perforation or abscess   • CHF (congestive heart failure)     family history is not on file.   reports that he has been smoking cigarettes. He has never used  smokeless tobacco. He reports current drug use. Drug: Methamphetamines. He reports that he does not drink alcohol.  No Known Allergies  Physical Activity: Not on file          Current Outpatient Medications:   •  carvedilol (COREG) 3.125 MG tablet, Take 2 tablets by mouth Every 12 (Twelve) Hours., Disp: 120 tablet, Rfl: 0  •  Farxiga 10 MG tablet, Take 10 mg by mouth Daily., Disp: 30 tablet, Rfl: 3  •  furosemide (Lasix) 40 MG tablet, Take 1 tablet by mouth Daily., Disp: 30 tablet, Rfl: 3  •  sacubitril-valsartan (Entresto) 24-26 MG tablet, Take 1 tablet by mouth 2 (Two) Times a Day., Disp: 60 tablet, Rfl: 1  •  spironolactone (ALDACTONE) 25 MG tablet, Take 1 tablet by mouth Daily., Disp: 30 tablet, Rfl: 3    Objective  Vital Sign Review:   Vitals:    03/14/25 1504   BP: (!) 147/105   Pulse: 94   SpO2: 98%             Body mass index is 27.25 kg/m².      03/14/25  1504   Weight: 83.7 kg (184 lb 9.6 oz)           Physical Exam:  Constitutional:       Appearance: Normal and healthy appearance.   HENT:      Head: Normocephalic.    Mouth/Throat:      Lips: Pink.   Neck:      Vascular: No carotid bruit or JVD. JVD normal.   Pulmonary:      Effort: Pulmonary effort is normal.      Breath sounds: Normal breath sounds.   Cardiovascular:      PMI at left midclavicular line. Normal rate. Regular rhythm.      Murmurs: There is a systolic murmur.   Pulses:     Radial: 2+ bilaterally.  Edema:     Peripheral edema absent.   Abdominal:      Palpations: Abdomen is soft.      Tenderness: There is no abdominal tenderness.   Skin:     General: Skin is warm and dry.      Capillary Refill: Capillary refill takes less than 2 seconds.   Neurological:      General: No focal deficit present.      Mental Status: Alert and oriented to person, place and time.   Psychiatric:         Behavior: Behavior is cooperative.          DATA REVIEWED:   EKG:      ---------------------------------------------------  ECHO:    Results for orders placed  during the hospital encounter of 08/23/24    Adult Transthoracic Echo Complete W/ Cont if Necessary Per Protocol    Interpretation Summary  •  The left ventricular cavity is severely dilated.  •  There is severe global hypokinesis. Best antwon segment appears to be the septum  •  Left ventricular systolic function is severely decreased. Left ventricular ejection fraction appears to be 21 - 25%.  •  Left ventricular diastolic function is consistent with (grade II w/high LAP) pseudonormalization.  •  Normal right ventricular cavity size and systolic function noted.  •  The left atrial cavity is moderately dilated.  •  Mild to moderate mitral valve regurgitation is present.  •  Mild tricuspid valve regurgitation is present  •  Calculated right ventricular systolic pressure from tricuspid regurgitation is 33 mmHg.  •  There is no evidence of pericardial effusion          -----------------------------------------------------  RHC/LHC    Results for orders placed during the hospital encounter of 11/15/20    Cardiac Catheterization/Vascular Study    Conclusion  · Severe systolic dysfunction.  · 24/ 1.  Dilated cardiomyopathy:  Etiology: Uncertain  Anatomy: Normal coronary arteries, severely dilated left ventricle  Physiology: Poor global left ventricular function, mitral regurgitation, congestive heart failure  Functional status: Severely compromised  Prognosis: Guarded      ---------------------------------------------------------------------------    CT:   No radiology results for the last 30 days.        --------------------------------------------------------------------------------------------------------------    Laboratory evaluations:  Renal Function: CrCl cannot be calculated (Patient's most recent lab result is older than the maximum 30 days allowed.).    Lab Results   Component Value Date    GLUCOSE 139 (H) 01/03/2025    BUN 21 (H) 01/03/2025    CREATININE 1.24 01/03/2025    EGFRIFNONA 74 09/01/2021     "EGFRIFAFRI >60 11/14/2020    BCR 16.9 01/03/2025    K 3.5 01/03/2025    CO2 22.3 01/03/2025    CALCIUM 8.3 (L) 01/03/2025    ALBUMIN 3.7 01/03/2025    LABIL2 1.1 11/14/2020    AST 23 01/03/2025    ALT 11 01/03/2025     Lab Results   Component Value Date    WBC 5.50 01/03/2025    HGB 14.1 01/03/2025    HCT 42.7 01/03/2025    MCV 89.9 01/03/2025     (L) 01/03/2025     No results found for: \"HGBA1C\"  No results found for: \"HGBA1C\"  Lab Results   Component Value Date    CREATININE 1.24 01/03/2025     No results found for: \"IRON\", \"TIBC\", \"FERRITIN\"    Result Review:  I have personally reviewed the results from the time of this admission to 3/14/2025 15:39 EDT and agree with these findings:  [x]  Laboratory list / accordion  []  Microbiology  [x]  Radiology  [x]  EKG/Telemetry   [x]  Cardiology/Vascular   []  Pathology  [x]  Old records  []  Other:  Most notable findings include:   Note last admission  \"Mr. Hager is a 53 y.o. gentleman with past medical history notable for nonischemic cardiomyopathy, hypertension, history of DVT, tobacco abuse, and ongoing methamphetamine use who presents to the emergency room with worsening shortness of breath and dyspnea on exertion.  He has been without Lasix for a number of weeks.  He reports that he did have access to his other medications possibly through our office may be through another nurse practitioner he had seen although somewhat questionable.  Nonetheless he has been restarted on his prior regimen.  Previously this is done a reasonable job with controlling his symptoms.  The main issue though is his ongoing methamphetamine use.  He admitted to using methamphetamine on 9/28.  Our emergency room did not get a drug screen not sure why but nonetheless he does admit to using methamphetamines likely the reason for his ongoing and persistent cardiomyopathy and does limit options to better treat him most notably further consideration for primary prevention ICD which would not " "be safe to implant in somebody with ongoing methamphetamine use.  I think if he were to get off of drugs his heart would likely recover this is usually the case not impossible that he has not another reason for his cardiomyopathy or an intrinsic nonischemic cardiomyopathy but again optimization of his medical regimen has been limited due to patient compliance follow-up and again ongoing drug use.  Hopefully we can get him off of drugs but he is really been hesitant to do so we have talked to him for the last 4 years about getting off drugs he is started to experience the complications of this with loss of dentition he appears frail and weak and now has no insurance due to not being able to keep a job all of which is related to his ongoing drug use unfortunately.  From our standpoint he is doing better would restart his prior regimen of 80 mg oral Lasix daily along with his prior ARB and carvedilol.  Previously on Aldactone would probably hold this until he is seen in follow-up.  Probably would not be unreasonable to have him referred to our heart failure clinic which does have some good resources as well anyway to get him appropriate follow-up would be great.  He might benefit from further IV diuresis today he was hoping to get out I would say if he walks around and does not have any oxygen needs he will do well with continued diuresis transition to oral diuretics and can follow-up with us as an outpatient. \"       PH Screening:  The patientwas screened today for PH.  The patient's last 2D TTE showed the patient does not currently have PH.         Sleep Evaluation:  We will need to discuss sleep evaluation at future visit.         6 MINUTE WALK   He refused 6min walk                    Cardiac Amyloid Screening Questions   Cardiac Amyloid Screening Questions     Does the patient experience or have a diagnosis of any of the following:    HF: yes  Aortic stenosis: no  Afib: no  Heart block: no  Peripheral neuropathy: " no  Orthostatic hypotension: no  Renal dysfunction: no  Carpal tunnel syndrome: no  Lumbar spinal stenosis: yes  Proteinuria: yes  Unexpected/unintentional weight loss: no  Frequent nausea/early satiety: no  Significant episodes of diarrhea/constipation: no        -ICD/CRT-D:   Current ICD/CRT-D indications:   *ICM with LVEF less than or equal to 35% due to prior myocardial infarction who are at least 40 days post-myocardial infarction and are in NYHA functional Class II or III.   *ICM due to prior myocardial infarction who are at least 40 days post-myocardial infarction, have an LVEF less than or equal to 30%, and are in NYHA functional Class I.   *NICM with LVEF less than or equal to 35% and who are in NYHA functional Class II or III. These patients should be on GDMT for at least 90 days with another LV EF assessment showing LV EF<35%.      [email or web address]    ------------------------------------------------------------------  Today, the patient was assessed for ICD/CRT-D therapy. ICD would be indicated as he has had continued depressed EF, however the patient does have continued methamphetamine use and there is concern about safety of ICD implantation in current/active drug user. However, given he is a candidate, I will refer to EP for further discussion/evaluation of ICD         AHF:   Around 10% of HF patients will need AHF. ALL patients should be screened at EACH VISIT for this indication. The INEEDHELP mnemonic is commonly utilized to determine if the patient needs an assessment for AHF therapies including inotropes at home, consideration for OHT/VAD.    Referral to HF Specialist: Uses the acronym I-NEED-HELP.  I: Intravenous inotropes  N: New York Heart Association (NYHA) class IIIB/IV or persistently elevated natriuretic peptides  E: End-organ dysfunction  E: EF <=35%  D: Defibrillator shocks  H: Hospitalizations >1  E: Edema despite  escalating diuretics  L: Low systolic BP <=90, high heart rate  P: Prognostic medication; progressive intolerance or down-titration of guideline-directed medical therapy [GDMT])    -The patient was screened today for AHF and does meet indications as he has had continued reduced EF.           ReDS VOLUMETRIC ASSESSMENT:  ReDs Vest    Performed by: Lizeth Mazariegos APRN  Authorized by: Lizeth Mazariegos APRN    ReDS value:  36  ReDS Value Description:  36-41 (orange) = Possible Hypervolemic Status        Assessment & Plan     1. Nonischemic dilated cardiomyopathy    2. Chronic HFrEF (heart failure with reduced ejection fraction)    3. Methamphetamine abuse    4. IV drug abuse          1-2 NICM/HFrEF--EF 21-25%. Pt appears euvolemic.   Very difficult situation given he is actively still using IV methamphetamines  Optimizing GDMT has been difficult as patient has not been compliant with his appointments.   Strongly re-enforced importance of medication compliance and follow-up with his doctors/specialists and follow-up in clinic to optimize GDMT.   Blood pressure has been elevated and at last visit he was started on Entresto 24-26 twice daily.  He thinks he is only taking it once a day, however we still have room to increase carvedilol.  Carvedilol increased to 6.25 twice daily.  I will continue Entresto at the current dose.  Look to increase to mid dose at next visit if hemodynamics allow.  He also was supposed to have labs drawn after last visit, but he did not go to lab, he states he will go to lab today  Regarding ICD due to continued HFrEF ---he was referred but canceled his appt with Dr Meraz-cards EP  Also discussed with the patient his advanced heart failure--he has been referred to AHF at [facility]--however he declined this as well  He was supposed to be getting established with a primary care provider--this appt was canceled.  GDMT listed below    3-4.  Methamphetamine abuse-he last used today.  His  methamphetamine abuse is complicating his care and situation.  He is not interested in recovery/rehab facility at this time.  Offered  referral to discuss options to stop the IV methamphetamine abuse.  He is not interested in stopping the methamphetamine abuse at this time.      NYHA stage C FC-III-IV     Clinical status was assessed and has worsened.  Treatment intent: curative   ReDS reading was obtained today.  ReDS result: 36       Today, Patient appears euvolemic. and with perfusion. The patient's hemodynamics are currently acceptable. HR is: normal and is not at goal. BP/MAP was reviewed and there is room for medication up-titration.  The patient's clinical course has been impacted by drug use and compliance. LVEF: 21-25%.     GDMT Assessment: The patient is currently on quadruple therapy (with addition of medications today).      GDMT changes recommended today:continue/restart all medications    BB:   continue Carvedilol  6.25mg bid  Reported side effects with metoprolol  Monitor heart rate.  Please call the [facility] for HR<50, dizziness, lightheadedness, syncope    A/A/A:     Continue entresto 24-26mg BID    Occasional monitoring of Chem-7 is recommended; call for the development of a new cough or S/Sx angioedema    SGLT2-I:  continue Dapagliflozin (Farxiga) 10mg daily  Call for S/Sx genital mycotic infections  Do not take when inadequate oral intake, NPO, GI illness    MRA:  The patient is FC-NYHA Class III and MRA is indicated.   continue Spironolactone to  25mg daily   BMP today and recommend quarterly       -DIURETIC:   furosemide (LASIX) 40 mg every day  -Fluid restriction:   -requested  -2000 ml  -Patient has been asked to weigh daily and was provided with a printed diuretic strategy.  -Sodium restriction:   -1,500 mg Na restriction was discussed.      Labs/Diagnostics/Referrals:    Labs -Chem-7       Lifestyle Advice:   - call office if I gain more than 2 pounds in one day or 5 pounds in one  week   - keep legs up while sitting   - use salt in moderation   - watch for swelling in feet, ankles and legs every day   - weigh myself daily   -call for concerning s/sx   -- activity or exercise based on tolerance encouraged     CODE STATUS: FULL              Return in about 2 weeks (around 3/28/2025).                Thank you for allowing me to participate in the care of your patient,    Time Spent: I spent 48 minutes caring for Mr. Colten Hager  on this date of service. This time includes time spent by me in the following activities: preparing for the visit, reviewing tests, performing a medically appropriate examination and/or evaluations, counseling and educating the patient/family/caregiver, ordering medications, tests, or procedures, documenting information in the medical record, and independently interpreting results and communicating that information with the patient/family/caregiver.       Lizeth Mazariegos, APRN    03/14/25  14:42 EDT      **Felipe Disclaimer:**  Much of this encounter note is an electronic transcription/translation of spoken language to printed text. The electronic translation of spoken language may permit erroneous, or at times, nonsensical words or phrases to be inadvertently transcribed. Although I have reviewed the note for such errors, some may still exist.    The information in this note was reviewed and updated during the visit to be as accurate as possible. As many patients have chronic medical problems, many of their individual problems and ongoing plans do not change significantly from visit to visit.  This information is correct and is consistent with my treatment plan as of today's visit.

## 2025-03-17 ENCOUNTER — RESULTS FOLLOW-UP (OUTPATIENT)
Dept: CARDIOLOGY | Facility: HOSPITAL | Age: 55
End: 2025-03-17
Payer: COMMERCIAL

## 2025-03-19 ENCOUNTER — TELEPHONE (OUTPATIENT)
Dept: CARDIOLOGY | Facility: CLINIC | Age: 55
End: 2025-03-19
Payer: COMMERCIAL

## 2025-03-19 NOTE — TELEPHONE ENCOUNTER
3/19/2025 1456  Patient's wife called today stating the patient has not been taking Entresto instead went back to taking lisinopril.  Discussed importance of Entresto for treatment of his heart failure.  Discussed I would like him to try taking the Entresto for 2 weeks to see how he feels.  Because he has been on the lisinopril he will require washout, he took lisinopril today.  Told his wife to not take any lisinopril tomorrow and he can start Entresto on 3/21/2025  They verbalized understanding

## 2025-04-02 ENCOUNTER — HOSPITAL ENCOUNTER (OUTPATIENT)
Dept: CARDIOLOGY | Facility: HOSPITAL | Age: 55
Discharge: HOME | End: 2025-04-02
Admitting: NURSE PRACTITIONER
Payer: COMMERCIAL

## 2025-04-02 VITALS
DIASTOLIC BLOOD PRESSURE: 69 MMHG | OXYGEN SATURATION: 98 % | WEIGHT: 180.2 LBS | HEART RATE: 88 BPM | SYSTOLIC BLOOD PRESSURE: 114 MMHG | BODY MASS INDEX: 26.69 KG/M2 | HEIGHT: 69 IN

## 2025-04-02 DIAGNOSIS — F15.10 METHAMPHETAMINE ABUSE: ICD-10-CM

## 2025-04-02 DIAGNOSIS — I50.22 CHRONIC HFREF (HEART FAILURE WITH REDUCED EJECTION FRACTION): Primary | ICD-10-CM

## 2025-04-02 DIAGNOSIS — I42.0 NONISCHEMIC DILATED CARDIOMYOPATHY: ICD-10-CM

## 2025-04-02 PROCEDURE — G0463 HOSPITAL OUTPT CLINIC VISIT: HCPCS

## 2025-04-02 PROCEDURE — 94726 PLETHYSMOGRAPHY LUNG VOLUMES: CPT | Performed by: NURSE PRACTITIONER

## 2025-04-02 RX ORDER — SACUBITRIL AND VALSARTAN 24; 26 MG/1; MG/1
1 TABLET, FILM COATED ORAL DAILY
Start: 2025-04-02

## 2025-04-02 RX ORDER — BISOPROLOL FUMARATE 5 MG/1
5 TABLET, FILM COATED ORAL DAILY
Qty: 30 TABLET | Refills: 3 | Status: SHIPPED | OUTPATIENT
Start: 2025-04-02

## 2025-04-02 NOTE — PROGRESS NOTES
[facility]    Michael Ward MD  [address]  [address]  [place] [address]    Thank you for asking me to see Colten Hager.     Congestive Heart Failure  Associated symptoms include shortness of breath.       1. NICM/HFrEF    Subjective   Mr. Colten Hager is a 53 year old male that presents to the Heart Failure clinic for initial evaluation of chronic NICM/HFrEF.   He has a history of nonischemic cardiomyopathy, hypertension, DVT and methamphetamine use.   He is followed by [facility] Dr Ward.   His cardiomyopathy has been felt to be due to chornic/contiued methamphetamine use. He has previously been admives to stop drug use and start on medical therapy.   Unfortunately,  he continued meth use.  Hospitalization 8/23/2024-8/25/2024 for acute appendicitis, he had 2D TTE during this hospitalization which showed-There is severe global hypokinesis. LVEF 21 - 25%.  Left ventricular diastolic function is consistent with (grade II w/high LAP) pseudonormalization.  Normal right ventricular cavity size and systolic function noted. The left atrial cavity is moderately dilated.  Mild to moderate mitral valve regurgitation is present. Mild tricuspid valve regurgitation is present. RVSP 33 mmHg.    ADHF hospitalization 9/29/2024. He received IV diuretics, and his prior regimen of 80 mg oral Lasix daily along with his prior ARB and carvedilol were continued at discharge. His aldactone was held at discharge until follow-up  He states he does want to stop meth use and has not used since most recent hospitalization 9/29. He presents to clinic with his wife who states they are currently under a great deal of financial strain as she is the only income for them and they are at risk of getting evicted from their current home.   Additionally he is very concerned because he does not have any insurance. They are working to get medicaid    Today Mr Hager presents  for follow-up--he states he has  been taking all of his medications.  However he states he is only taking medications once a day, he does not think he is taking the carvedilol or Entresto twice a day.  He reports stable weights.  He has declined EP referral and [facility] AHF referral.  He was supposed to follow-up with primary care, however the appt was canceled and he did not reschedule.   Unfortunately his still using methamphetamines.  Last use today.  He does use IV methamphetamines      Review of Systems - Review of Systems   Constitutional: Positive for malaise/fatigue. Negative for weight gain and weight loss.   Cardiovascular:  Positive for dyspnea on exertion. Negative for leg swelling.   Respiratory:  Positive for shortness of breath.           Patient Active Problem List   Diagnosis    Essential hypertension    Substance abuse    Dyslipidemia    GERD without esophagitis    Methamphetamine abuse    IV drug abuse    Tobacco abuse    Chronic HFrEF (heart failure with reduced ejection fraction)    Acute deep vein thrombosis (DVT) of calf muscle vein of left lower extremity    Nonischemic dilated cardiomyopathy    History of DVT of lower extremity    Acute appendicitis with localized peritonitis, without perforation or abscess    CHF (congestive heart failure)     family history is not on file.   reports that he has been smoking cigarettes. He has never used smokeless tobacco. He reports current drug use. Drug: Methamphetamines. He reports that he does not drink alcohol.  No Known Allergies  Physical Activity: Not on file          Current Outpatient Medications:     carvedilol (COREG) 3.125 MG tablet, Take 2 tablets by mouth Every 12 (Twelve) Hours. (Patient taking differently: Take 2 tablets by mouth Every 12 (Twelve) Hours. One tab once daily), Disp: 120 tablet, Rfl: 0    Farxiga 10 MG tablet, Take 10 mg by mouth Daily., Disp: 30 tablet, Rfl: 3    furosemide (Lasix) 40 MG tablet, Take 1 tablet by mouth Daily., Disp: 30 tablet, Rfl: 3     sacubitril-valsartan (Entresto) 24-26 MG tablet, Take 1 tablet by mouth 2 (Two) Times a Day., Disp: 60 tablet, Rfl: 1    spironolactone (ALDACTONE) 25 MG tablet, Take 1 tablet by mouth Daily., Disp: 30 tablet, Rfl: 3    Objective   Vital Sign Review:   Vitals:    04/02/25 1046   BP: 114/69   Pulse: 88   SpO2: 98%             Body mass index is 26.6 kg/m².      04/02/25  1046   Weight: 81.7 kg (180 lb 3.2 oz)           Physical Exam:  Constitutional:       Appearance: Normal and healthy appearance.   HENT:      Head: Normocephalic.    Mouth/Throat:      Lips: Pink.   Neck:      Vascular: No carotid bruit or JVD. JVD normal.   Pulmonary:      Effort: Pulmonary effort is normal.      Breath sounds: Normal breath sounds.   Cardiovascular:      PMI at left midclavicular line. Normal rate. Regular rhythm.      Murmurs: There is a systolic murmur.   Pulses:     Radial: 2+ bilaterally.  Edema:     Peripheral edema absent.   Abdominal:      Palpations: Abdomen is soft.      Tenderness: There is no abdominal tenderness.   Skin:     General: Skin is warm and dry.      Capillary Refill: Capillary refill takes less than 2 seconds.   Neurological:      General: No focal deficit present.      Mental Status: Alert and oriented to person, place and time.   Psychiatric:         Behavior: Behavior is cooperative.          DATA REVIEWED:   EKG:      ---------------------------------------------------  ECHO:    Results for orders placed during the hospital encounter of 08/23/24    Adult Transthoracic Echo Complete W/ Cont if Necessary Per Protocol    Interpretation Summary    The left ventricular cavity is severely dilated.    There is severe global hypokinesis. Best antwon segment appears to be the septum    Left ventricular systolic function is severely decreased. Left ventricular ejection fraction appears to be 21 - 25%.    Left ventricular diastolic function is consistent with (grade II w/high LAP) pseudonormalization.     "Normal right ventricular cavity size and systolic function noted.    The left atrial cavity is moderately dilated.    Mild to moderate mitral valve regurgitation is present.    Mild tricuspid valve regurgitation is present    Calculated right ventricular systolic pressure from tricuspid regurgitation is 33 mmHg.    There is no evidence of pericardial effusion          -----------------------------------------------------  RHC/LHC    Results for orders placed during the hospital encounter of 11/15/20    Cardiac Catheterization/Vascular Study    Conclusion  · Severe systolic dysfunction.  · 24/ 1.  Dilated cardiomyopathy:  Etiology: Uncertain  Anatomy: Normal coronary arteries, severely dilated left ventricle  Physiology: Poor global left ventricular function, mitral regurgitation, congestive heart failure  Functional status: Severely compromised  Prognosis: Guarded      ---------------------------------------------------------------------------    CT:   No radiology results for the last 30 days.        --------------------------------------------------------------------------------------------------------------    Laboratory evaluations:  Renal Function: Estimated Creatinine Clearance: 73.1 mL/min (A) (by C-G formula based on SCr of 1.32 mg/dL (H)).    Lab Results   Component Value Date    GLUCOSE 90 03/14/2025    BUN 20 03/14/2025    CREATININE 1.32 (H) 03/14/2025    EGFRIFNONA 74 09/01/2021    EGFRIFAFRI >60 11/14/2020    BCR 15.2 03/14/2025    K 4.0 03/14/2025    CO2 27.3 03/14/2025    CALCIUM 9.1 03/14/2025    ALBUMIN 3.7 01/03/2025    LABIL2 1.1 11/14/2020    AST 23 01/03/2025    ALT 11 01/03/2025     Lab Results   Component Value Date    WBC 5.50 01/03/2025    HGB 14.1 01/03/2025    HCT 42.7 01/03/2025    MCV 89.9 01/03/2025     (L) 01/03/2025     No results found for: \"HGBA1C\"  No results found for: \"HGBA1C\"  Lab Results   Component Value Date    CREATININE 1.32 (H) 03/14/2025     No results found " "for: \"IRON\", \"TIBC\", \"FERRITIN\"    Result Review:  I have personally reviewed the results from the time of this admission to 4/2/2025 11:05 EDT and agree with these findings:  [x]  Laboratory list / accordion  []  Microbiology  [x]  Radiology  [x]  EKG/Telemetry   [x]  Cardiology/Vascular   []  Pathology  [x]  Old records  []  Other:  Most notable findings include:   Note last admission  \"Mr. Hager is a 53 y.o. gentleman with past medical history notable for nonischemic cardiomyopathy, hypertension, history of DVT, tobacco abuse, and ongoing methamphetamine use who presents to the emergency room with worsening shortness of breath and dyspnea on exertion.  He has been without Lasix for a number of weeks.  He reports that he did have access to his other medications possibly through our office may be through another nurse practitioner he had seen although somewhat questionable.  Nonetheless he has been restarted on his prior regimen.  Previously this is done a reasonable job with controlling his symptoms.  The main issue though is his ongoing methamphetamine use.  He admitted to using methamphetamine on 9/28.  Our emergency room did not get a drug screen not sure why but nonetheless he does admit to using methamphetamines likely the reason for his ongoing and persistent cardiomyopathy and does limit options to better treat him most notably further consideration for primary prevention ICD which would not be safe to implant in somebody with ongoing methamphetamine use.  I think if he were to get off of drugs his heart would likely recover this is usually the case not impossible that he has not another reason for his cardiomyopathy or an intrinsic nonischemic cardiomyopathy but again optimization of his medical regimen has been limited due to patient compliance follow-up and again ongoing drug use.  Hopefully we can get him off of drugs but he is really been hesitant to do so we have talked to him for the last 4 years " "about getting off drugs he is started to experience the complications of this with loss of dentition he appears frail and weak and now has no insurance due to not being able to keep a job all of which is related to his ongoing drug use unfortunately.  From our standpoint he is doing better would restart his prior regimen of 80 mg oral Lasix daily along with his prior ARB and carvedilol.  Previously on Aldactone would probably hold this until he is seen in follow-up.  Probably would not be unreasonable to have him referred to our heart failure clinic which does have some good resources as well anyway to get him appropriate follow-up would be great.  He might benefit from further IV diuresis today he was hoping to get out I would say if he walks around and does not have any oxygen needs he will do well with continued diuresis transition to oral diuretics and can follow-up with us as an outpatient. \"       PH Screening:  The patientwas screened today for PH.  The patient's last 2D TTE showed the patient does not currently have PH.         Sleep Evaluation:  We will need to discuss sleep evaluation at future visit.         6 MINUTE WALK   He refused 6min walk                    Cardiac Amyloid Screening Questions   Cardiac Amyloid Screening Questions     Does the patient experience or have a diagnosis of any of the following:    HF: yes  Aortic stenosis: no  Afib: no  Heart block: no  Peripheral neuropathy: no  Orthostatic hypotension: no  Renal dysfunction: no  Carpal tunnel syndrome: no  Lumbar spinal stenosis: yes  Proteinuria: yes  Unexpected/unintentional weight loss: no  Frequent nausea/early satiety: no  Significant episodes of diarrhea/constipation: no        -ICD/CRT-D:   Current ICD/CRT-D indications:   *ICM with LVEF less than or equal to 35% due to prior myocardial infarction who are at least 40 days post-myocardial infarction and are in NYHA functional Class II or III.   *ICM due to prior myocardial " infarction who are at least 40 days post-myocardial infarction, have an LVEF less than or equal to 30%, and are in NYHA functional Class I.   *NICM with LVEF less than or equal to 35% and who are in NYHA functional Class II or III. These patients should be on GDMT for at least 90 days with another LV EF assessment showing LV EF<35%.      [email or web address]    ------------------------------------------------------------------  Today, the patient was assessed for ICD/CRT-D therapy. ICD would be indicated as he has had continued depressed EF, however the patient does have continued methamphetamine use and there is concern about safety of ICD implantation in current/active drug user. However, given he is a candidate, I will refer to EP for further discussion/evaluation of ICD         AHF:   Around 10% of HF patients will need AHF. ALL patients should be screened at EACH VISIT for this indication. The INEEDHELP mnemonic is commonly utilized to determine if the patient needs an assessment for AHF therapies including inotropes at home, consideration for OHT/VAD.    Referral to HF Specialist: Uses the acronym I-NEED-HELP.  I: Intravenous inotropes  N: New York Heart Association (NYHA) class IIIB/IV or persistently elevated natriuretic peptides  E: End-organ dysfunction  E: EF <=35%  D: Defibrillator shocks  H: Hospitalizations >1  E: Edema despite escalating diuretics  L: Low systolic BP <=90, high heart rate  P: Prognostic medication; progressive intolerance or down-titration of guideline-directed medical therapy [GDMT])    -The patient was screened today for AHF and does meet indications as he has had continued reduced EF. He does not want to go to [facility] AHF                ReDS VOLUMETRIC ASSESSMENT:  ReDs Vest    Performed by: Lizeth Mazariegos APRN  Authorized by: Lizeth Mazariegos APRN    ReDS value:  35  ReDS Value Description:  25-35  (green) = Optimal Volume Status        Assessment & Plan      No diagnosis found.        1-2 NICM/HFrEF--EF 21-25%. Pt appears euvolemic.   Very difficult situation given he is actively still using IV methamphetamines  Optimizing GDMT has been difficult as patient has not been compliant with his appointments.   Strongly re-enforced importance of medication compliance and follow-up with his doctors/specialists and follow-up in clinic to optimize GDMT.   Blood pressure has been elevated and at last visit he was started on Entresto 24-26 twice daily.  He thinks he is only taking it once a day, however we still have room to increase carvedilol.  Carvedilol increased to 6.25 twice daily.  I will continue Entresto at the current dose.  Look to increase to mid dose at next visit if hemodynamics allow.  He also was supposed to have labs drawn after last visit, but he did not go to lab, he states he will go to lab today  Regarding ICD due to continued HFrEF ---he was referred but canceled his appt with Dr Meraz-cards EP  Also discussed with the patient his advanced heart failure--he has been referred to AHF at [facility]--however he declined this as well  He was supposed to be getting established with a primary care provider--this appt was canceled.  GDMT listed below    3-4.  Methamphetamine abuse-he last used today.  His methamphetamine abuse is complicating his care and situation.  He is not interested in recovery/rehab facility at this time.  Offered  referral to discuss options to stop the IV methamphetamine abuse.  He is not interested in stopping the methamphetamine abuse at this time.          NYHA stage C FC-III-IV     Clinical status was assessed and has worsened.  Treatment intent: curative   ReDS reading was obtained today.  ReDS result: 36       Today, Patient appears euvolemic. and with perfusion. The patient's hemodynamics are currently acceptable. HR is: normal and is not at goal. BP/MAP was  reviewed and there is room for medication up-titration.  The patient's clinical course has been impacted by drug use and compliance. LVEF: 21-25%.     GDMT Assessment: The patient is currently on quadruple therapy (with addition of medications today).      GDMT changes recommended today:continue/restart all medications    BB:   change carvedilol to bisoprolol  Reported side effects with metoprolol  Monitor heart rate.  Please call the [facility] for HR<50, dizziness, lightheadedness, syncope    A/A/A:     Continue entresto 24-26mg BID    Occasional monitoring of Chem-7 is recommended; call for the development of a new cough or S/Sx angioedema    SGLT2-I:  continue Dapagliflozin (Farxiga) 10mg daily  Call for S/Sx genital mycotic infections  Do not take when inadequate oral intake, NPO, GI illness    MRA:  The patient is FC-NYHA Class III and MRA is indicated.   continue Spironolactone to  25mg daily   BMP today and recommend quarterly       -DIURETIC:   furosemide (LASIX) 40 mg every day  -Fluid restriction:   -requested  -2000 ml  -Patient has been asked to weigh daily and was provided with a printed diuretic strategy.  -Sodium restriction:   -1,500 mg Na restriction was discussed.      Labs/Diagnostics/Referrals:    Labs -Chem-7       Lifestyle Advice:   - call office if I gain more than 2 pounds in one day or 5 pounds in one week   - keep legs up while sitting   - use salt in moderation   - watch for swelling in feet, ankles and legs every day   - weigh myself daily   -call for concerning s/sx   -- activity or exercise based on tolerance encouraged     CODE STATUS: FULL              No follow-ups on file.                Thank you for allowing me to participate in the care of your patient,    Time Spent: I spent 48 minutes caring for Mr. Colten Hager  on this date of service. This time includes time spent by me in the following activities: preparing for the visit, reviewing tests, performing a medically appropriate  examination and/or evaluations, counseling and educating the patient/family/caregiver, ordering medications, tests, or procedures, documenting information in the medical record, and independently interpreting results and communicating that information with the patient/family/caregiver.    I spent 3 minutes on the separately reported service interpreting the ReDs. This time is not included in the time used to support E/M service also reported on this date of service        Lizeth Mazariegos, APRN    04/02/25  14:42 EDT      **Felipe Disclaimer:**  Much of this encounter note is an electronic transcription/translation of spoken language to printed text. The electronic translation of spoken language may permit erroneous, or at times, nonsensical words or phrases to be inadvertently transcribed. Although I have reviewed the note for such errors, some may still exist.    The information in this note was reviewed and updated during the visit to be as accurate as possible. As many patients have chronic medical problems, many of their individual problems and ongoing plans do not change significantly from visit to visit.  This information is correct and is consistent with my treatment plan as of today's visit.

## 2025-04-02 NOTE — PATIENT INSTRUCTIONS
Directions for when to call the clinic reviewed with the patient to include weight gain of 2 to 3 pounds in 24 hours, weight gain of 5 to 10 pounds within 7 days; worsening shortness of breath; worsening lower extremity edema or abdominal distention.    We are changing carvedilol to bisoprolol.  Stop carvedilol and start taking bisoprolol 5 mg once a day.    We have changed your medications to all once a day to help with compliance in taking them.  Please call if you have any issues with your medications.

## 2025-04-02 NOTE — LETTER
April 8, 2025     Barby Crawford MD  [address]  [address]  [facility] [address]    Patient: Colten Hager   YOB: 1970   Date of Visit: 4/2/2025     Dear Barby Crawford MD:       Thank you for referring Colten Hager to me for evaluation. Below are the relevant portions of my assessment and plan of care.    If you have questions, please do not hesitate to call me. I look forward to following Colten along with you.         Sincerely,        DIANA Waldrop        CC: No Recipients    Lizeth Mazariegos APRN  04/08/25 0922  Signed    [facility]    Michael Ward MD  [address]  [address]  [place] [address]    Thank you for asking me to see Colten Hager.     Congestive Heart Failure  Associated symptoms include shortness of breath.       1. NICM/HFrEF    Subjective  Mr. Colten Hager is a 53 year old male that presents to the Heart Failure clinic for initial evaluation of chronic NICM/HFrEF.   He has a history of nonischemic cardiomyopathy, hypertension, DVT and methamphetamine use.   He is followed by G Dr Ward.   His cardiomyopathy has been felt to be due to chornic/contiued methamphetamine use. He has previously been admives to stop drug use and start on medical therapy.   Unfortunately,  he continued meth use.  Hospitalization 8/23/2024-8/25/2024 for acute appendicitis, he had 2D TTE during this hospitalization which showed-There is severe global hypokinesis. LVEF 21 - 25%.  Left ventricular diastolic function is consistent with (grade II w/high LAP) pseudonormalization.  Normal right ventricular cavity size and systolic function noted. The left atrial cavity is moderately dilated.  Mild to moderate mitral valve regurgitation is present. Mild tricuspid valve regurgitation is present. RVSP 33 mmHg.    ADHF hospitalization 9/29/2024. He received IV diuretics, and his prior regimen of 80 mg oral Lasix daily along with his  prior ARB and carvedilol were continued at discharge. His aldactone was held at discharge until follow-up  He states he does want to stop meth use and has not used since most recent hospitalization 9/29. He presents to clinic with his wife who states they are currently under a great deal of financial strain as she is the only income for them and they are at risk of getting evicted from their current home.   Additionally he is very concerned because he does not have any insurance. They are working to get medicaid    Today Mr Hager presents  for follow-up--he states he has been taking all of his medications including the Entresto which was re-added.  However he states he is only taking medications once a day, he is not taking the carvedilol or Entresto twice a day.  He reports stable weights.  He has declined EP referral and [facility] AHF referral.  He was supposed to follow-up with primary care, however the appt was canceled and he did not reschedule.   Unfortunately his still using methamphetamines.  Last use today.  He does use IV methamphetamines      Review of Systems - Review of Systems   Constitutional: Positive for malaise/fatigue. Negative for weight gain and weight loss.   Cardiovascular:  Positive for dyspnea on exertion. Negative for leg swelling.   Respiratory:  Positive for shortness of breath.           Patient Active Problem List   Diagnosis   • Essential hypertension   • Substance abuse   • Dyslipidemia   • GERD without esophagitis   • Methamphetamine abuse   • IV drug abuse   • Tobacco abuse   • Chronic HFrEF (heart failure with reduced ejection fraction)   • Acute deep vein thrombosis (DVT) of calf muscle vein of left lower extremity   • Nonischemic dilated cardiomyopathy   • History of DVT of lower extremity   • Acute appendicitis with localized peritonitis, without perforation or abscess   • CHF (congestive heart failure)     family history is not on file.   reports that he has been smoking  cigarettes. He has never used smokeless tobacco. He reports current drug use. Drug: Methamphetamines. He reports that he does not drink alcohol.  No Known Allergies  Physical Activity: Not on file          Current Outpatient Medications:   •  carvedilol (COREG) 3.125 MG tablet, Take 2 tablets by mouth Every 12 (Twelve) Hours. (Patient taking differently: Take 2 tablets by mouth Every 12 (Twelve) Hours. One tab once daily), Disp: 120 tablet, Rfl: 0  •  Farxiga 10 MG tablet, Take 10 mg by mouth Daily., Disp: 30 tablet, Rfl: 3  •  furosemide (Lasix) 40 MG tablet, Take 1 tablet by mouth Daily., Disp: 30 tablet, Rfl: 3  •  sacubitril-valsartan (Entresto) 24-26 MG tablet, Take 1 tablet by mouth 2 (Two) Times a Day., Disp: 60 tablet, Rfl: 1  •  spironolactone (ALDACTONE) 25 MG tablet, Take 1 tablet by mouth Daily., Disp: 30 tablet, Rfl: 3    Objective  Vital Sign Review:   Vitals:    04/02/25 1046   BP: 114/69   Pulse: 88   SpO2: 98%             Body mass index is 26.6 kg/m².      04/02/25  1046   Weight: 81.7 kg (180 lb 3.2 oz)           Physical Exam:  Constitutional:       Appearance: Normal and healthy appearance.   HENT:      Head: Normocephalic.    Mouth/Throat:      Lips: Pink.   Neck:      Vascular: No carotid bruit or JVD. JVD normal.   Pulmonary:      Effort: Pulmonary effort is normal.      Breath sounds: Normal breath sounds.   Cardiovascular:      PMI at left midclavicular line. Normal rate. Regular rhythm.      Murmurs: There is a systolic murmur.   Pulses:     Radial: 2+ bilaterally.  Edema:     Peripheral edema absent.   Abdominal:      Palpations: Abdomen is soft.      Tenderness: There is no abdominal tenderness.   Skin:     General: Skin is warm and dry.      Capillary Refill: Capillary refill takes less than 2 seconds.   Neurological:      General: No focal deficit present.      Mental Status: Alert and oriented to person, place and time.   Psychiatric:         Behavior: Behavior is cooperative.           DATA REVIEWED:   EKG:      ---------------------------------------------------  ECHO:    Results for orders placed during the hospital encounter of 08/23/24    Adult Transthoracic Echo Complete W/ Cont if Necessary Per Protocol    Interpretation Summary  •  The left ventricular cavity is severely dilated.  •  There is severe global hypokinesis. Best antwon segment appears to be the septum  •  Left ventricular systolic function is severely decreased. Left ventricular ejection fraction appears to be 21 - 25%.  •  Left ventricular diastolic function is consistent with (grade II w/high LAP) pseudonormalization.  •  Normal right ventricular cavity size and systolic function noted.  •  The left atrial cavity is moderately dilated.  •  Mild to moderate mitral valve regurgitation is present.  •  Mild tricuspid valve regurgitation is present  •  Calculated right ventricular systolic pressure from tricuspid regurgitation is 33 mmHg.  •  There is no evidence of pericardial effusion          -----------------------------------------------------  RHC/LHC    Results for orders placed during the hospital encounter of 11/15/20    Cardiac Catheterization/Vascular Study    Conclusion  · Severe systolic dysfunction.  · 24/ 1.  Dilated cardiomyopathy:  Etiology: Uncertain  Anatomy: Normal coronary arteries, severely dilated left ventricle  Physiology: Poor global left ventricular function, mitral regurgitation, congestive heart failure  Functional status: Severely compromised  Prognosis: Guarded      ---------------------------------------------------------------------------    CT:   No radiology results for the last 30 days.        --------------------------------------------------------------------------------------------------------------    Laboratory evaluations:  Renal Function: Estimated Creatinine Clearance: 73.1 mL/min (A) (by C-G formula based on SCr of 1.32 mg/dL (H)).    Lab Results   Component Value Date     "GLUCOSE 90 03/14/2025    BUN 20 03/14/2025    CREATININE 1.32 (H) 03/14/2025    EGFRIFNONA 74 09/01/2021    EGFRIFAFRI >60 11/14/2020    BCR 15.2 03/14/2025    K 4.0 03/14/2025    CO2 27.3 03/14/2025    CALCIUM 9.1 03/14/2025    ALBUMIN 3.7 01/03/2025    LABIL2 1.1 11/14/2020    AST 23 01/03/2025    ALT 11 01/03/2025     Lab Results   Component Value Date    WBC 5.50 01/03/2025    HGB 14.1 01/03/2025    HCT 42.7 01/03/2025    MCV 89.9 01/03/2025     (L) 01/03/2025     No results found for: \"HGBA1C\"  No results found for: \"HGBA1C\"  Lab Results   Component Value Date    CREATININE 1.32 (H) 03/14/2025     No results found for: \"IRON\", \"TIBC\", \"FERRITIN\"    Result Review:  I have personally reviewed the results from the time of this admission to 4/2/2025 11:05 EDT and agree with these findings:  [x]  Laboratory list / accordion  []  Microbiology  [x]  Radiology  [x]  EKG/Telemetry   [x]  Cardiology/Vascular   []  Pathology  [x]  Old records  []  Other:  Most notable findings include:   Note last admission  \"Mr. Hager is a 53 y.o. gentleman with past medical history notable for nonischemic cardiomyopathy, hypertension, history of DVT, tobacco abuse, and ongoing methamphetamine use who presents to the emergency room with worsening shortness of breath and dyspnea on exertion.  He has been without Lasix for a number of weeks.  He reports that he did have access to his other medications possibly through our office may be through another nurse practitioner he had seen although somewhat questionable.  Nonetheless he has been restarted on his prior regimen.  Previously this is done a reasonable job with controlling his symptoms.  The main issue though is his ongoing methamphetamine use.  He admitted to using methamphetamine on 9/28.  Our emergency room did not get a drug screen not sure why but nonetheless he does admit to using methamphetamines likely the reason for his ongoing and persistent cardiomyopathy and does " "limit options to better treat him most notably further consideration for primary prevention ICD which would not be safe to implant in somebody with ongoing methamphetamine use.  I think if he were to get off of drugs his heart would likely recover this is usually the case not impossible that he has not another reason for his cardiomyopathy or an intrinsic nonischemic cardiomyopathy but again optimization of his medical regimen has been limited due to patient compliance follow-up and again ongoing drug use.  Hopefully we can get him off of drugs but he is really been hesitant to do so we have talked to him for the last 4 years about getting off drugs he is started to experience the complications of this with loss of dentition he appears frail and weak and now has no insurance due to not being able to keep a job all of which is related to his ongoing drug use unfortunately.  From our standpoint he is doing better would restart his prior regimen of 80 mg oral Lasix daily along with his prior ARB and carvedilol.  Previously on Aldactone would probably hold this until he is seen in follow-up.  Probably would not be unreasonable to have him referred to our heart failure clinic which does have some good resources as well anyway to get him appropriate follow-up would be great.  He might benefit from further IV diuresis today he was hoping to get out I would say if he walks around and does not have any oxygen needs he will do well with continued diuresis transition to oral diuretics and can follow-up with us as an outpatient. \"       PH Screening:  The patientwas screened today for PH.  The patient's last 2D TTE showed the patient does not currently have PH.         Sleep Evaluation:  We will need to discuss sleep evaluation at future visit.         6 MINUTE WALK   He refused 6min walk                    Cardiac Amyloid Screening Questions   Cardiac Amyloid Screening Questions     Does the patient experience or have a " diagnosis of any of the following:    HF: yes  Aortic stenosis: no  Afib: no  Heart block: no  Peripheral neuropathy: no  Orthostatic hypotension: no  Renal dysfunction: no  Carpal tunnel syndrome: no  Lumbar spinal stenosis: yes  Proteinuria: yes  Unexpected/unintentional weight loss: no  Frequent nausea/early satiety: no  Significant episodes of diarrhea/constipation: no        -ICD/CRT-D:   Current ICD/CRT-D indications:   *ICM with LVEF less than or equal to 35% due to prior myocardial infarction who are at least 40 days post-myocardial infarction and are in NYHA functional Class II or III.   *ICM due to prior myocardial infarction who are at least 40 days post-myocardial infarction, have an LVEF less than or equal to 30%, and are in NYHA functional Class I.   *NICM with LVEF less than or equal to 35% and who are in NYHA functional Class II or III. These patients should be on GDMT for at least 90 days with another LV EF assessment showing LV EF<35%.      [email or web address]-[email or web address]    ------------------------------------------------------------------  Today, the patient was assessed for ICD/CRT-D therapy. ICD would be indicated as he has had continued depressed EF, however the patient does have continued methamphetamine use and there is concern about safety of ICD implantation in current/active drug user. However, given he is a candidate, I will refer to EP for further discussion/evaluation of ICD         AHF:   Around 10% of HF patients will need AHF. ALL patients should be screened at EACH VISIT for this indication. The INEEDHELP mnemonic is commonly utilized to determine if the patient needs an assessment for AHF therapies including inotropes at home, consideration for OHT/VAD.    Referral to HF Specialist: Uses the acronym I-NEED-HELP.  I: Intravenous inotropes  N: New York Heart Association (NYHA) class IIIB/IV or persistently elevated  natriuretic peptides  E: End-organ dysfunction  E: EF <=35%  D: Defibrillator shocks  H: Hospitalizations >1  E: Edema despite escalating diuretics  L: Low systolic BP <=90, high heart rate  P: Prognostic medication; progressive intolerance or down-titration of guideline-directed medical therapy [GDMT])    -The patient was screened today for AHF and does meet indications as he has had continued reduced EF. [facility]                ReDS VOLUMETRIC ASSESSMENT:  ReDs Vest    Performed by: Lizeth Mazariegos APRN  Authorized by: Lizeth Mazariegos APRN    ReDS value:  35  ReDS Value Description:  25-35 (green) = Optimal Volume Status        Assessment & Plan     1. Chronic HFrEF (heart failure with reduced ejection fraction)    2. Nonischemic dilated cardiomyopathy    3. Methamphetamine abuse      1-2 NICM/HFrEF--EF 21-25%. Pt appears euvolemic.   Very difficult situation given he is actively still using IV methamphetamines  Optimizing GDMT has been difficult as patient has not been compliant with his appointments or medications.   We are providing lyft rides for him to/from appts   He has tolerated the restarting of entresto---but was only taking it once daily. I would like him to continue entresto--discussed because he will only take his medications once daily we will keep entresto as once daily so that we have it in his med rec how he is actually taking it. Educated on MOA and reasoning for BID dosing, but pt states too difficult to remember BID dosing. Also due to difficulty with BID dosing we will change carvedilol to bisoprolol.    Regarding ICD due to continued HFrEF ---he was referred but canceled his appt with Dr Enoch LOMBARDO--he declines this referral--education provided  Also discussed with the patient his advanced heart failure--he has been referred to [facility] at [facility]--however he declined this as well  He was supposed to be getting established with a primary care provider--this  appt was canceled.  GDMT listed below    3-4.  Methamphetamine abuse-he is still actively using. I will continue to offer  referral anytime he is ready to quit or go to rehab.  His methamphetamine abuse is complicating his care and situation.  He is not interested in recovery/rehab facility at this time. He is not interested in stopping the methamphetamine abuse at this time.          NYHA stage C FC-III-IV     Clinical status was assessed and has worsened.  Treatment intent: curative   ReDS reading was obtained today.  ReDS result: 35       Today, Patient appears euvolemic. and with perfusion. The patient's hemodynamics are currently acceptable. HR is: normal and is not at goal. BP/MAP was reviewed and there is room for medication up-titration.  The patient's clinical course has been impacted by drug use and compliance. LVEF: 21-25%.     GDMT Assessment: The patient is currently on quadruple therapy (with addition of medications today).      GDMT changes recommended today:change BB    BB:   change carvedilol to bisoprolol 5mg daily  Reported side effects with metoprolol  Monitor heart rate.  Please call the HF for HR<50, dizziness, lightheadedness, syncope    A/A/A:     Continue entresto 24-26mg daily    Occasional monitoring of Chem-7 is recommended; call for the development of a new cough or S/Sx angioedema    SGLT2-I:  continue Dapagliflozin (Farxiga) 10mg daily  Call for S/Sx genital mycotic infections  Do not take when inadequate oral intake, NPO, GI illness    MRA:  The patient is FC-NYHA Class III and MRA is indicated.   continue Spironolactone to  25mg daily   BMP today and recommend quarterly       -DIURETIC:   furosemide (LASIX) 40 mg every day  -Fluid restriction:   -requested  -2000 ml  -Patient has been asked to weigh daily and was provided with a printed diuretic strategy.  -Sodium restriction:   -1,500 mg Na restriction was discussed.      Labs/Diagnostics/Referrals:    Labs -Chem-7        Lifestyle Advice:   - call office if I gain more than 2 pounds in one day or 5 pounds in one week   - keep legs up while sitting   - use salt in moderation   - watch for swelling in feet, ankles and legs every day   - weigh myself daily   -call for concerning s/sx   -- activity or exercise based on tolerance encouraged     CODE STATUS: FULL              Return in about 1 month (around 5/2/2025).                Thank you for allowing me to participate in the care of your patient,    Time Spent: I spent 28 minutes caring for Mr. Colten Hager  on this date of service. This time includes time spent by me in the following activities: preparing for the visit, reviewing tests, performing a medically appropriate examination and/or evaluations, counseling and educating the patient/family/caregiver, ordering medications, tests, or procedures, documenting information in the medical record, and independently interpreting results and communicating that information with the patient/family/caregiver.    I spent 1 minutes on the separately reported service interpreting the ReDs. This time is not included in the time used to support E/M service also reported on this date of service        DIANA Waldrop    04/02/25  14:42 EDT      **Felipe Disclaimer:**  Much of this encounter note is an electronic transcription/translation of spoken language to printed text. The electronic translation of spoken language may permit erroneous, or at times, nonsensical words or phrases to be inadvertently transcribed. Although I have reviewed the note for such errors, some may still exist.    The information in this note was reviewed and updated during the visit to be as accurate as possible. As many patients have chronic medical problems, many of their individual problems and ongoing plans do not change significantly from visit to visit.  This information is correct and is consistent with my treatment plan as of today's visit.

## 2025-04-22 RX ORDER — DAPAGLIFLOZIN 10 MG/1
10 TABLET, FILM COATED ORAL DAILY
Qty: 28 TABLET | Refills: 0 | COMMUNITY
Start: 2025-04-22

## 2025-05-06 ENCOUNTER — HOSPITAL ENCOUNTER (EMERGENCY)
Facility: HOSPITAL | Age: 55
Discharge: LEFT BEFORE DISCHARGE | End: 2025-05-06
Payer: COMMERCIAL

## 2025-05-06 VITALS — OXYGEN SATURATION: 94 % | TEMPERATURE: 96.7 F | HEART RATE: 80 BPM

## 2025-05-06 PROCEDURE — 99211 OFF/OP EST MAY X REQ PHY/QHP: CPT

## 2025-05-06 RX ORDER — SODIUM CHLORIDE 0.9 % (FLUSH) 0.9 %
10 SYRINGE (ML) INJECTION AS NEEDED
Status: DISCONTINUED | OUTPATIENT
Start: 2025-05-06 | End: 2025-05-06 | Stop reason: HOSPADM

## 2025-06-10 ENCOUNTER — RESULTS FOLLOW-UP (OUTPATIENT)
Dept: CARDIOLOGY | Facility: HOSPITAL | Age: 55
End: 2025-06-10
Payer: COMMERCIAL

## 2025-06-10 ENCOUNTER — LAB (OUTPATIENT)
Dept: LAB | Facility: HOSPITAL | Age: 55
End: 2025-06-10
Payer: COMMERCIAL

## 2025-06-10 ENCOUNTER — HOSPITAL ENCOUNTER (OUTPATIENT)
Dept: CARDIOLOGY | Facility: HOSPITAL | Age: 55
Discharge: HOME | End: 2025-06-10
Payer: COMMERCIAL

## 2025-06-10 VITALS
HEART RATE: 83 BPM | DIASTOLIC BLOOD PRESSURE: 84 MMHG | SYSTOLIC BLOOD PRESSURE: 129 MMHG | OXYGEN SATURATION: 99 % | BODY MASS INDEX: 26.57 KG/M2 | WEIGHT: 179.4 LBS | HEIGHT: 69 IN

## 2025-06-10 DIAGNOSIS — I42.0 NONISCHEMIC DILATED CARDIOMYOPATHY: ICD-10-CM

## 2025-06-10 DIAGNOSIS — I50.22 CHRONIC HFREF (HEART FAILURE WITH REDUCED EJECTION FRACTION): ICD-10-CM

## 2025-06-10 DIAGNOSIS — F15.10 METHAMPHETAMINE ABUSE: ICD-10-CM

## 2025-06-10 DIAGNOSIS — I50.22 CHRONIC HFREF (HEART FAILURE WITH REDUCED EJECTION FRACTION): Primary | ICD-10-CM

## 2025-06-10 LAB
ANION GAP SERPL CALCULATED.3IONS-SCNC: 8.5 MMOL/L (ref 5–15)
BUN SERPL-MCNC: 18 MG/DL (ref 6–20)
BUN/CREAT SERPL: 14.1 (ref 7–25)
CALCIUM SPEC-SCNC: 9.3 MG/DL (ref 8.6–10.5)
CHLORIDE SERPL-SCNC: 103 MMOL/L (ref 98–107)
CO2 SERPL-SCNC: 28.5 MMOL/L (ref 22–29)
CREAT SERPL-MCNC: 1.28 MG/DL (ref 0.76–1.27)
EGFRCR SERPLBLD CKD-EPI 2021: 66.1 ML/MIN/1.73
GLUCOSE SERPL-MCNC: 71 MG/DL (ref 65–99)
POTASSIUM SERPL-SCNC: 3.6 MMOL/L (ref 3.5–5.2)
SODIUM SERPL-SCNC: 140 MMOL/L (ref 136–145)

## 2025-06-10 PROCEDURE — 94726 PLETHYSMOGRAPHY LUNG VOLUMES: CPT | Performed by: NURSE PRACTITIONER

## 2025-06-10 PROCEDURE — 80048 BASIC METABOLIC PNL TOTAL CA: CPT

## 2025-06-10 PROCEDURE — G0463 HOSPITAL OUTPT CLINIC VISIT: HCPCS

## 2025-06-10 PROCEDURE — 36415 COLL VENOUS BLD VENIPUNCTURE: CPT

## 2025-06-10 RX ORDER — BISOPROLOL FUMARATE 5 MG/1
10 TABLET, FILM COATED ORAL DAILY
Qty: 60 TABLET | Refills: 3 | Status: SHIPPED | OUTPATIENT
Start: 2025-06-10

## 2025-06-10 NOTE — PROGRESS NOTES
[facility]    Provider, [place] [address]    Thank you for asking me to see Colten Hager.     Congestive Heart Failure  Associated symptoms include shortness of breath.       1. NICM/HFrEF    Subjective   Mr. Colten Hager is a 53 year old male that presents to the Heart Failure clinic for initial evaluation of chronic NICM/HFrEF.   He has a history of nonischemic cardiomyopathy, hypertension, DVT and methamphetamine use.   He is followed by [facility] Dr Ward.   His cardiomyopathy has been felt to be due to chornic/contiued methamphetamine use. He has previously been admives to stop drug use and start on medical therapy.   Unfortunately,  he continued meth use.  Hospitalization 8/23/2024-8/25/2024 for acute appendicitis, he had 2D TTE during this hospitalization which showed-There is severe global hypokinesis. LVEF 21 - 25%.  Left ventricular diastolic function is consistent with (grade II w/high LAP) pseudonormalization.  Normal right ventricular cavity size and systolic function noted. The left atrial cavity is moderately dilated.  Mild to moderate mitral valve regurgitation is present. Mild tricuspid valve regurgitation is present. RVSP 33 mmHg.    ADHF hospitalization 9/29/2024. He received IV diuretics, and his prior regimen of 80 mg oral Lasix daily along with his prior ARB and carvedilol were continued at discharge. His aldactone was held at discharge until follow-up  He states he does want to stop meth use and has not used since most recent hospitalization 9/29. He presents to clinic with his wife who states they are currently under a great deal of financial strain as she is the only income for them and they are at risk of getting evicted from their current home.   Additionally he is very concerned because he does not have any insurance. They are working to get medicaid    Today Mr Hager presents  for follow-up--he states he has been  taking all of his medications including the Entresto which was re-added.  However he states he is only taking medications once a day, he is not taking the carvedilol or Entresto twice a day.  He reports stable weights.  He has declined EP referral and [facility] AHF referral.  He was supposed to follow-up with primary care, however the appt was canceled and he did not reschedule.   Unfortunately his still using methamphetamines.  Last use today.  He does use IV methamphetamines      Review of Systems - Review of Systems   Constitutional: Positive for malaise/fatigue. Negative for weight gain and weight loss.   Cardiovascular:  Positive for dyspnea on exertion. Negative for leg swelling.   Respiratory:  Positive for shortness of breath.           Patient Active Problem List   Diagnosis    Essential hypertension    Substance abuse    Dyslipidemia    GERD without esophagitis    Methamphetamine abuse    IV drug abuse    Tobacco abuse    Chronic HFrEF (heart failure with reduced ejection fraction)    Acute deep vein thrombosis (DVT) of calf muscle vein of left lower extremity    Nonischemic dilated cardiomyopathy    History of DVT of lower extremity    Acute appendicitis with localized peritonitis, without perforation or abscess    CHF (congestive heart failure)     family history is not on file.   reports that he has been smoking cigarettes. He has never used smokeless tobacco. He reports current drug use. Drug: Methamphetamines. He reports that he does not drink alcohol.  No Known Allergies  Physical Activity: Not on file          Current Outpatient Medications:     bisoprolol (ZEBeta) 5 MG tablet, Take 1 tablet by mouth Daily., Disp: 30 tablet, Rfl: 3    Farxiga 10 MG tablet, Take 10 mg by mouth Daily., Disp: 28 tablet, Rfl: 0    furosemide (Lasix) 40 MG tablet, Take 1 tablet by mouth Daily., Disp: 30 tablet, Rfl: 3    sacubitril-valsartan (Entresto) 24-26 MG tablet, Take 1 tablet by mouth Daily., Disp: , Rfl:      spironolactone (ALDACTONE) 25 MG tablet, Take 1 tablet by mouth Daily., Disp: 30 tablet, Rfl: 3    Objective   Vital Sign Review:   Vitals:    06/10/25 0958   BP: 129/84   Pulse: 83   SpO2: 99%               Body mass index is 26.48 kg/m².      06/10/25  0958   Weight: 81.4 kg (179 lb 6.4 oz)             Physical Exam:  Constitutional:       Appearance: Normal and healthy appearance.   HENT:      Head: Normocephalic.    Mouth/Throat:      Lips: Pink.   Neck:      Vascular: No carotid bruit or JVD. JVD normal.   Pulmonary:      Effort: Pulmonary effort is normal.      Breath sounds: Normal breath sounds.   Cardiovascular:      PMI at left midclavicular line. Normal rate. Regular rhythm.      Murmurs: There is a systolic murmur.   Pulses:     Radial: 2+ bilaterally.  Edema:     Peripheral edema absent.   Abdominal:      Palpations: Abdomen is soft.      Tenderness: There is no abdominal tenderness.   Skin:     General: Skin is warm and dry.      Capillary Refill: Capillary refill takes less than 2 seconds.   Neurological:      General: No focal deficit present.      Mental Status: Alert and oriented to person, place and time.   Psychiatric:         Behavior: Behavior is cooperative.          DATA REVIEWED:   EKG:      ---------------------------------------------------  ECHO:    Results for orders placed during the hospital encounter of 08/23/24    Adult Transthoracic Echo Complete W/ Cont if Necessary Per Protocol    Interpretation Summary    The left ventricular cavity is severely dilated.    There is severe global hypokinesis. Best antwon segment appears to be the septum    Left ventricular systolic function is severely decreased. Left ventricular ejection fraction appears to be 21 - 25%.    Left ventricular diastolic function is consistent with (grade II w/high LAP) pseudonormalization.    Normal right ventricular cavity size and systolic function noted.    The left atrial cavity is moderately dilated.     "Mild to moderate mitral valve regurgitation is present.    Mild tricuspid valve regurgitation is present    Calculated right ventricular systolic pressure from tricuspid regurgitation is 33 mmHg.    There is no evidence of pericardial effusion          -----------------------------------------------------  RHC/LHC    Results for orders placed during the hospital encounter of 11/15/20    Cardiac Catheterization/Vascular Study    Conclusion  · Severe systolic dysfunction.  · 24/ 1.  Dilated cardiomyopathy:  Etiology: Uncertain  Anatomy: Normal coronary arteries, severely dilated left ventricle  Physiology: Poor global left ventricular function, mitral regurgitation, congestive heart failure  Functional status: Severely compromised  Prognosis: Guarded      ---------------------------------------------------------------------------    CT:   No radiology results for the last 30 days.        --------------------------------------------------------------------------------------------------------------    Laboratory evaluations:  Renal Function: CrCl cannot be calculated (Patient's most recent lab result is older than the maximum 30 days allowed.).    Lab Results   Component Value Date    GLUCOSE 90 03/14/2025    BUN 20 03/14/2025    CREATININE 1.32 (H) 03/14/2025    EGFRIFNONA 74 09/01/2021    EGFRIFAFRI >60 11/14/2020    BCR 15.2 03/14/2025    K 4.0 03/14/2025    CO2 27.3 03/14/2025    CALCIUM 9.1 03/14/2025    ALBUMIN 3.7 01/03/2025    LABIL2 1.1 11/14/2020    AST 23 01/03/2025    ALT 11 01/03/2025     Lab Results   Component Value Date    WBC 5.50 01/03/2025    HGB 14.1 01/03/2025    HCT 42.7 01/03/2025    MCV 89.9 01/03/2025     (L) 01/03/2025     No results found for: \"HGBA1C\"  No results found for: \"HGBA1C\"  Lab Results   Component Value Date    CREATININE 1.32 (H) 03/14/2025     No results found for: \"IRON\", \"TIBC\", \"FERRITIN\"    Result Review:  I have personally reviewed the results from the time of this " "admission to 6/10/2025 10:07 EDT and agree with these findings:  [x]  Laboratory list / accordion  []  Microbiology  [x]  Radiology  [x]  EKG/Telemetry   [x]  Cardiology/Vascular   []  Pathology  [x]  Old records  []  Other:  Most notable findings include:   Note last admission  \"Mr. Hager is a 53 y.o. gentleman with past medical history notable for nonischemic cardiomyopathy, hypertension, history of DVT, tobacco abuse, and ongoing methamphetamine use who presents to the emergency room with worsening shortness of breath and dyspnea on exertion.  He has been without Lasix for a number of weeks.  He reports that he did have access to his other medications possibly through our office may be through another nurse practitioner he had seen although somewhat questionable.  Nonetheless he has been restarted on his prior regimen.  Previously this is done a reasonable job with controlling his symptoms.  The main issue though is his ongoing methamphetamine use.  He admitted to using methamphetamine on 9/28.  Our emergency room did not get a drug screen not sure why but nonetheless he does admit to using methamphetamines likely the reason for his ongoing and persistent cardiomyopathy and does limit options to better treat him most notably further consideration for primary prevention ICD which would not be safe to implant in somebody with ongoing methamphetamine use.  I think if he were to get off of drugs his heart would likely recover this is usually the case not impossible that he has not another reason for his cardiomyopathy or an intrinsic nonischemic cardiomyopathy but again optimization of his medical regimen has been limited due to patient compliance follow-up and again ongoing drug use.  Hopefully we can get him off of drugs but he is really been hesitant to do so we have talked to him for the last 4 years about getting off drugs he is started to experience the complications of this with loss of dentition he appears " "frail and weak and now has no insurance due to not being able to keep a job all of which is related to his ongoing drug use unfortunately.  From our standpoint he is doing better would restart his prior regimen of 80 mg oral Lasix daily along with his prior ARB and carvedilol.  Previously on Aldactone would probably hold this until he is seen in follow-up.  Probably would not be unreasonable to have him referred to our heart failure clinic which does have some good resources as well anyway to get him appropriate follow-up would be great.  He might benefit from further IV diuresis today he was hoping to get out I would say if he walks around and does not have any oxygen needs he will do well with continued diuresis transition to oral diuretics and can follow-up with us as an outpatient. \"       PH Screening:  The patientwas screened today for PH.  The patient's last 2D TTE showed the patient does not currently have PH.         Sleep Evaluation:  We will need to discuss sleep evaluation at future visit.         6 MINUTE WALK   He refused 6min walk                     -ICD/CRT-D:   Current ICD/CRT-D indications:   *ICM with LVEF less than or equal to 35% due to prior myocardial infarction who are at least 40 days post-myocardial infarction and are in NYHA functional Class II or III.   *ICM due to prior myocardial infarction who are at least 40 days post-myocardial infarction, have an LVEF less than or equal to 30%, and are in NYHA functional Class I.   *NICM with LVEF less than or equal to 35% and who are in NYHA functional Class II or III. These patients should be on GDMT for at least 90 days with another LV EF assessment showing LV EF<35%.      [email or web address]-content/[email or web address]    ------------------------------------------------------------------  Today, the patient was assessed for ICD/CRT-D therapy. ICD would be indicated as he has had continued depressed " EF, however the patient does have continued methamphetamine use and there is concern about safety of ICD implantation in current/active drug user. However, given he is a candidate, I will refer to EP for further discussion/evaluation of ICD         AHF:   Around 10% of HF patients will need AHF. ALL patients should be screened at EACH VISIT for this indication. The INEEDHELP mnemonic is commonly utilized to determine if the patient needs an assessment for AHF therapies including inotropes at home, consideration for OHT/VAD.    Referral to HF Specialist: Uses the acronym I-NEED-HELP.  I: Intravenous inotropes  N: New York Heart Association (NYHA) class IIIB/IV or persistently elevated natriuretic peptides  E: End-organ dysfunction  E: EF <=35%  D: Defibrillator shocks  H: Hospitalizations >1  E: Edema despite escalating diuretics  L: Low systolic BP <=90, high heart rate  P: Prognostic medication; progressive intolerance or down-titration of guideline-directed medical therapy [GDMT])    -The patient was screened today for AHF and does meet indications as he has had continued reduced EF. He does not want to go to [facility]                ReDS VOLUMETRIC ASSESSMENT:  ReDs Vest    Performed by: Lizeth Mazariegos APRN  Authorized by: Lizeth Mazariegos APRN    ReDS value:  36  ReDS Value Description:  36-41 (orange) = Possible Hypervolemic Status        Assessment & Plan      1. Chronic HFrEF (heart failure with reduced ejection fraction)    2. Nonischemic dilated cardiomyopathy    3. Methamphetamine abuse      1-2 NICM/HFrEF--EF 21-25%. Pt appears euvolemic.   Very difficult situation given he is actively still using IV methamphetamines--last used a few days ago  Optimizing GDMT has been difficult as patient has not been compliant with his appointments or medications.   HR is not at goal, we will increase bisoprolol 10mg daily  We will get updated limited echo with next visit to assess LVEF improvement with  addition of GDMT.   We are providing lyft rides for him to/from appts   He has tolerated the restarting of entresto---but was only taking it once daily. I would like him to continue entresto--discussed because he will only take his medications once daily we will keep entresto as once daily so that we have it in his med rec how he is actually taking it. Educated on MOA and reasoning for BID dosing, but pt states too difficult to remember BID dosing. Also due to difficulty with BID dosing we will change carvedilol to bisoprolol.    Regarding ICD due to continued HFrEF ---he was referred but canceled his appt with Dr Meraz-stacey EP--he declines this referral--education provided  Also discussed with the patient his advanced heart failure--he has been referred to AHF at [facility]--however he declined this as well  He was supposed to be getting established with a primary care provider--this appt was canceled.  We will check BMP today  GDMT listed below    3.  Methamphetamine abuse-he is still actively using. I will continue to offer  referral anytime he is ready to quit or go to rehab.  His methamphetamine abuse is complicating his care and situation.  He is not interested in recovery/rehab facility at this time. He is not interested in stopping the methamphetamine abuse at this time.          NYHA stage C FC-III-IV     Clinical status was assessed and has worsened.  Treatment intent: curative   ReDS reading was obtained today.  ReDS result: 36       Today, Patient appears euvolemic. and with perfusion. The patient's hemodynamics are currently acceptable. HR is: normal and is not at goal. BP/MAP was reviewed and there is room for medication up-titration.  The patient's clinical course has been impacted by drug use and compliance. LVEF: 21-25%.     GDMT Assessment: The patient is currently on quadruple therapy (with addition of medications today).      GDMT changes recommended today:change BB    BB:   increase  bisoprolol 5mg daily----increase to 10mg daily  Reported side effects with metoprolol  Monitor heart rate.  Please call the [facility] for HR<50, dizziness, lightheadedness, syncope    A/A/A:     Continue entresto 24-26mg once daily    Occasional monitoring of Chem-7 is recommended; call for the development of a new cough or S/Sx angioedema    SGLT2-I:  continue Dapagliflozin (Farxiga) 10mg daily  Call for S/Sx genital mycotic infections  Do not take when inadequate oral intake, NPO, GI illness    MRA:  The patient is FC-NYHA Class III and MRA is indicated.   continue Spironolactone to 25mg daily   BMP today and recommend quarterly       -DIURETIC:   furosemide (LASIX) 40 mg every day  -Fluid restriction:   -requested  -2000 ml  -Patient has been asked to weigh daily and was provided with a printed diuretic strategy.  -Sodium restriction:   -1,500 mg Na restriction was discussed.      Labs/Diagnostics/Referrals:    Labs -Chem-7       Lifestyle Advice:   - call office if I gain more than 2 pounds in one day or 5 pounds in one week   - keep legs up while sitting   - use salt in moderation   - watch for swelling in feet, ankles and legs every day   - weigh myself daily   -call for concerning s/sx   -- activity or exercise based on tolerance encouraged     CODE STATUS: FULL              No follow-ups on file.                Thank you for allowing me to participate in the care of your patient,    Time Spent: I spent 28 minutes caring for Mr. Colten Hager  on this date of service. This time includes time spent by me in the following activities: preparing for the visit, reviewing tests, performing a medically appropriate examination and/or evaluations, counseling and educating the patient/family/caregiver, ordering medications, tests, or procedures, documenting information in the medical record, and independently interpreting results and communicating that information with the patient/family/caregiver.    I spent 1 minutes on the  separately reported service interpreting the ReDs. This time is not included in the time used to support E/M service also reported on this date of service        Lizeth Mazariegos, APRN    06/10/25  14:42 EDT      **Felipe Disclaimer:**  Much of this encounter note is an electronic transcription/translation of spoken language to printed text. The electronic translation of spoken language may permit erroneous, or at times, nonsensical words or phrases to be inadvertently transcribed. Although I have reviewed the note for such errors, some may still exist.    The information in this note was reviewed and updated during the visit to be as accurate as possible. As many patients have chronic medical problems, many of their individual problems and ongoing plans do not change significantly from visit to visit.  This information is correct and is consistent with my treatment plan as of today's visit.

## 2025-06-10 NOTE — ADDENDUM NOTE
Encounter addended by: Lizeth Mazariegos APRN on: 6/10/2025 10:32 AM   Actions taken: Clinical Note Signed

## 2025-06-10 NOTE — ADDENDUM NOTE
Encounter addended by: Lizbeth Washington MA on: 6/10/2025 11:16 AM   Actions taken: Charge Capture section accepted

## 2025-06-10 NOTE — PATIENT INSTRUCTIONS
Directions for when to call the clinic reviewed with the patient to include weight gain of over 2 to 3 pounds in 24 hours, weight gain of over 5 pounds within 7 days; worsening shortness of breath; worsening lower extremity edema or abdominal distention.    Increase bisoprolol to 10mg daily (which will be taking 2 tablets daily).     We will get an echo next and follow-up to discuss results

## 2025-07-24 RX ORDER — SACUBITRIL AND VALSARTAN 24; 26 MG/1; MG/1
1 TABLET, FILM COATED ORAL DAILY
Qty: 30 TABLET | Refills: 3 | Status: SHIPPED | OUTPATIENT
Start: 2025-07-24

## 2025-07-24 RX ORDER — FUROSEMIDE 40 MG/1
40 TABLET ORAL DAILY
Qty: 30 TABLET | Refills: 3 | Status: SHIPPED | OUTPATIENT
Start: 2025-07-24

## 2025-07-24 RX ORDER — DAPAGLIFLOZIN 10 MG/1
10 TABLET, FILM COATED ORAL DAILY
Qty: 30 TABLET | Refills: 3 | Status: SHIPPED | OUTPATIENT
Start: 2025-07-24

## 2025-07-24 RX ORDER — BISOPROLOL FUMARATE 5 MG/1
10 TABLET, FILM COATED ORAL DAILY
Qty: 60 TABLET | Refills: 3 | Status: SHIPPED | OUTPATIENT
Start: 2025-07-24

## 2025-07-24 RX ORDER — SPIRONOLACTONE 25 MG/1
25 TABLET ORAL DAILY
Qty: 30 TABLET | Refills: 3 | Status: SHIPPED | OUTPATIENT
Start: 2025-07-24

## 2025-07-24 NOTE — TELEPHONE ENCOUNTER
Caller: HagerColten    Relationship: Self    Best call back number: [phone]    Requested Prescriptions:   Requested Prescriptions     Pending Prescriptions Disp Refills    bisoprolol (ZEBeta) 5 MG tablet 60 tablet 3     Sig: Take 2 tablets by mouth Daily.    Farxiga 10 MG tablet 28 tablet 0     Sig: Take 10 mg by mouth Daily.    furosemide (Lasix) 40 MG tablet 30 tablet 3     Sig: Take 1 tablet by mouth Daily.    sacubitril-valsartan (Entresto) 24-26 MG tablet       Sig: Take 1 tablet by mouth Daily.    spironolactone (ALDACTONE) 25 MG tablet 30 tablet 3     Sig: Take 1 tablet by mouth Daily.        Pharmacy where request should be sent:   [id number] - [place], [place] - [address] AT SEC OF [id number] - [phone]  - [phone] FX   [address] [address]   Phone: [phone] Fax: [phone]       Last office visit with prescribing clinician: Visit date not found   Last telemedicine visit with prescribing clinician: Visit date not found   Next office visit with prescribing clinician: Visit date not found     Additional details provided by patient:   HF USUALLY FILLS THESE MEDICATIONS.     Does the patient have less than a 3 day supply:  [] Yes  [x] No    Would you like a call back once the refill request has been completed: [] Yes [x] No    If the office needs to give you a call back, can they leave a voicemail: [] Yes [x] No    Enio Mesa   07/24/25 09:03 EDT

## (undated) DEVICE — LAPAROVUE VISIBILITY SYSTEM LAPAROSCOPIC SOLUTIONS: Brand: LAPAROVUE

## (undated) DEVICE — SYR LL TP 10ML STRL

## (undated) DEVICE — GW INQWIRE FC PTFE J/3MM .035 180

## (undated) DEVICE — ENDOPATH PNEUMONEEDLE INSUFFLATION NEEDLES WITH LUER LOCK CONNECTORS 120MM: Brand: ENDOPATH

## (undated) DEVICE — SOL ANTISTICK CAUTRY ELECTROLUBE LF

## (undated) DEVICE — SYR LUERLOK 20CC BX/50

## (undated) DEVICE — ARM DRAPE

## (undated) DEVICE — KT MANIFLD CARDIAC

## (undated) DEVICE — GLIDESHEATH BASIC HYDROPHILIC COATED INTRODUCER SHEATH: Brand: GLIDESHEATH

## (undated) DEVICE — COLUMN DRAPE

## (undated) DEVICE — ADHS SKIN SURG TISS VISC PREMIERPRO EXOFIN HI/VISC FAST/DRY

## (undated) DEVICE — TISSUE RETRIEVAL SYSTEM: Brand: INZII RETRIEVAL SYSTEM

## (undated) DEVICE — APPL CHLORAPREP HI/LITE 26ML ORNG

## (undated) DEVICE — SUT MNCRYL PLS ANTIB UD 4/0 PS2 18IN

## (undated) DEVICE — TR BAND RADIAL ARTERY COMPRESSION DEVICE: Brand: TR BAND

## (undated) DEVICE — STAPLER SHEATH: Brand: ENDOWRIST

## (undated) DEVICE — LOU LAP CHOLE: Brand: MEDLINE INDUSTRIES, INC.

## (undated) DEVICE — THE STERILE LIGHT HANDLE COVER IS USED WITH STERIS SURGICAL LIGHTING AND VISUALIZATION SYSTEMS.

## (undated) DEVICE — SEAL

## (undated) DEVICE — TOTAL TRAY, 16FR 10ML SIL FOLEY, URN: Brand: MEDLINE

## (undated) DEVICE — ST TBG AIRSEAL BIF FLTR W/ACT/CHARCOAL/FLTR

## (undated) DEVICE — SUT VIC 0 UR6 27IN VCP603H

## (undated) DEVICE — VESSEL SEALER EXTEND: Brand: ENDOWRIST

## (undated) DEVICE — CATH DIAG IMPULSE FR4 6F 100CM

## (undated) DEVICE — GLV SURG SENSICARE PI MIC PF SZ6.5 LF STRL

## (undated) DEVICE — CATH DIAG IMPULSE PIG145 6F 110CM

## (undated) DEVICE — SKIN PREP TRAY W/CHG: Brand: MEDLINE INDUSTRIES, INC.

## (undated) DEVICE — PK CATH CARD 40

## (undated) DEVICE — CATH DIAG IMPULSE FL3.5 6F 100CM

## (undated) DEVICE — BLADELESS OBTURATOR: Brand: WECK VISTA

## (undated) DEVICE — TIP COVER ACCESSORY

## (undated) DEVICE — TROC BLADLES AIRSEAL/OPTI THRD 8X120MM 1P/U

## (undated) DEVICE — GW EMR FIX EXCHG J STD .035 3MM 260CM

## (undated) DEVICE — COVER,MAYO STAND,STERILE: Brand: MEDLINE